# Patient Record
Sex: FEMALE | Race: WHITE | NOT HISPANIC OR LATINO | Employment: UNEMPLOYED | URBAN - METROPOLITAN AREA
[De-identification: names, ages, dates, MRNs, and addresses within clinical notes are randomized per-mention and may not be internally consistent; named-entity substitution may affect disease eponyms.]

---

## 2017-07-18 ENCOUNTER — TRANSFERRED RECORDS (OUTPATIENT)
Dept: HEALTH INFORMATION MANAGEMENT | Facility: CLINIC | Age: 42
End: 2017-07-18

## 2017-08-01 ENCOUNTER — BEH TREATMENT PLAN (OUTPATIENT)
Dept: BEHAVIORAL HEALTH | Facility: CLINIC | Age: 42
End: 2017-08-01
Attending: NURSE PRACTITIONER

## 2017-08-01 ENCOUNTER — HOSPITAL ENCOUNTER (OUTPATIENT)
Dept: BEHAVIORAL HEALTH | Facility: CLINIC | Age: 42
Discharge: HOME | End: 2017-08-01
Attending: PSYCHOLOGIST | Admitting: PSYCHOLOGIST
Payer: MEDICARE

## 2017-08-01 ENCOUNTER — TELEPHONE (OUTPATIENT)
Dept: BEHAVIORAL HEALTH | Facility: CLINIC | Age: 42
End: 2017-08-01

## 2017-08-01 PROCEDURE — 90791 PSYCH DIAGNOSTIC EVALUATION: CPT

## 2017-08-01 RX ORDER — LEVOTHYROXINE SODIUM 50 UG/1
50 TABLET ORAL
COMMUNITY
Start: 2016-11-01

## 2017-08-01 RX ORDER — LAMOTRIGINE 150 MG/1
150 TABLET ORAL
COMMUNITY
Start: 2017-07-05

## 2017-08-01 ASSESSMENT — PAIN SCALES - GENERAL: PAINLEVEL: NO PAIN (0)

## 2017-08-01 NOTE — PROGRESS NOTES
MY COPING PLAN FOR SAFETY          Things that are most important to me & reasons for living:  Enjoying life more and wanting to get back on track.  I want to achieve things in life.                My relapse Warning Signs: Increased use of alcohol, Increased negative thoughts, decreased motivation, staying in bed more, isolation, watching a lot of TV, feeling disinterested in doing anything even if I would normally enjoy it  I will make my environment safer by: Environment feels safe  When in crisis, I will use the following coping skills: (relaxation/self-soothing/distraction/activity):  Self-care, exercise, treating physical health symptoms, DBT skills- O2E/ER,   I will use My Support System:   Personal Supports: I can ask for reminders, support, or for them to stay with me  Trusted Friend/s:  Memo Hidalgo,   Family Member/s : Mom and Dad                 Professional Supports: I can ask for med changes, emergency appointments, help  Psychiatrist: Dr. Medina  Therapist: Rosanne    Other: Jose ([facility])    Crisis Lines I can call to discuss options and to access support:  By [facility]:    [place] ([facility]) [phone]   [place]/Luis Manuel (Mental Health Crisis Program) [phone]   [place]  [phone]   [place] (COPE) [phone]   [place] [phone]   [place] ([facility] Crisis [facility]) [phone]   [place] ([facility], [place], [place], [place], [facility]) [phone]   Other Crisis Lines:   [facility] (Counseling) [phone]   National Suicide Prevention [phone]   Suicide Prevention [phone]        X  I will attend my Treatment Program and talk to my one of my therapists:      X   I agree that I will report any current / recent thoughts, impulses or plans of suicide,           homicide, self injurious behavior or substance use .   X   I agree that I will not act on the above symptoms, but will follow the above plan         instead.  I can also go to the Emergency Department at  [facility]: [facility] [phone] or call: [phone]

## 2017-08-01 NOTE — PROGRESS NOTES
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 in assessment:   Bernadine was alone during this assessment.   This assessment does not include collateral information.      Summary & Recommendations  Provide a brief summary of how diagnostic criteria is met (symptoms, duration & functional impairment), cause, prognosis, and likely consequences of symptoms. Include overview of pertinent client strengths, cultural influences, life situations, relationships, health concerns and how diagnosis interacts/impacts with client's life. Recommendations include: client preferences, prioritization of needed mental health, ancillary or other services and any referrals to services required by statute or rule.     Bernadine is a 41 year old, single, white female.  She currently lives at home with her mom and step dad in a home in [place] [facility] after breaking up with her boyfriend three years ago. She is not currently employed but has a past history of working as a , , and in document review.  She states a desire to eventually work towards full time employment but recognizes it would be hard to find job due to gap in job history.  She states desire to find part time employment.  She is not currently enrolled in school but has a law degree from the [employer].  She reports having few friendships and states her former best friend moved away and told Bernadine that her (Jims) mental health problems were her own fault.  She reports a conflicted relationship with her younger brother.  She reports a decent relationship  with her mom and step dad despite periods of difficulty in the past.  Her team of providers includes a therapist ( Rosanne Barrera), chronic fatigue specialist (Stephane Burt), psychiatrist (Dr. Medina at [facility]), a primary care physician (Dr. Aguilar at [facility]), and an [employer] worker (Jose through [employer]).    She is referred to day treatment by her psychiatrist after being hospitalized twice very near each other.  The first hospitalization (Nov 2016) occurred after Bernadine attempted suicide by overdose on Ambien.  The plan was interrupted as she texted her parents who contacted the security at the hotel she was at.  Increasing symptoms and suicidal ideation lead up to second hospitalization at the end of February/beginning of March 2017.  She reports this is her only past suicide attempt but that she has frequently had ideation about attempting suicide with a gun; she does not have access to a gun and denies ever taking steps to obtain one.  She denies any safety related concerns at the time of assessment.  After her second hospitalization she attended [facility] programming and reports feeling much better since attending this program.  She states a desire to attend day treatment to create positive structure and maintain progress made while at [facility].    Bernadine reports symptoms including sad mood, irritability/anger, and anxiety related to tasks she is procrastinating.  She reports sleep difficulties including difficulty falling asleep and difficulty getting out of bed upon waking.  She notes presence of chronic fatigue syndrome diagnosis and fatigue as a symptom of depression with low motivation and energy.  She notes no difficulties concentrating but states difficulties with memory which she attributes to ECT treatment from November 2014.  She denies presence of manic/hypomanic symptoms currently or by history.  She denies psychotic and trauma related symptoms.  She reports CD treatment when one year ago  when she was drinking 5x/week (4 drinks each time).  She does not plan to live a life of abstinence and states she is not currently using due to being on an elimination diet for chronic fatigue syndrome.  She states previous to elimination diet she would drink 1-2x/week and one drink at a time.     Bernadine is placed in the 2B group meeting Mondays, Wednesdays and Fridays 9-12 starting 08/7/2017.      Prognosis is Fair. Without the recommended intervention, the client is likely to experience the following consequences of their symptoms: increase in depression, decrease in motivation, potential return of safety concerns and/or need for hospitalization.    Referrals to services required by statute or rule:   Report to child/adult protection services was NA.   Referral to another professional/service is not indicated at this time..    Program Recommendation: Day Treatment (DT) . 2B MWF 9-12    Assessment Completed by: Salome Sheppard

## 2017-08-01 NOTE — PROGRESS NOTES
"Initial Individual Treatment Plan     Patient: Bernadine Samaniego   MRN: [id number]  : 1975  Age: 41 year old  Sex: female    Diagnostic Assessment Date / Date of Initial Individual Treatment Plan: 17      Immediate Health Concerns:  Yes. Identify health concern and plan to address: CFS and has a specialist working with     Immediate Safety Concerns:  Yes. Identify safety concern and plan to address: safety plan created    Identify the issues to be addressed in treatment:  Symptom Management, Personal Safety, Community Resources/Discharge Planning, Develop / Improve Independent Living Skills, Develop Socialization / Interpersonal Relationship Skills and Physical Health     Client Initial Individualized Goals for Treatment: \"Positive structure.  Concerned about backsliding.  I want to maintain where I am now because I am doing much better.  Continue to move forward.  Work towards finding a part time job- figure out what makes sense as first steps and what type of job to pursue first.\".     Initial Treatment suggestions for the client during the time between Diagnostic Assessment and completion of the Individualized Treatment Plan:  Follow Safety Plan   Ask for more information, support and/or assistance as needed.  Follow up with providers/community supports as needed: Apt with psychiatrist this month, Therapist this week,   Report increases or changes in symptoms to staff.  Report any personal safety concerns to staff.   Take medications as prescribed.  Report medication changes and/or side effects to staff.  Attend and participate in groups as scheduled or notify staff if unable to do so.  Report any use of substances to staff as this may impact your symptoms and/or  personal safety.  Notify staff if you have any other issues that need to be addressed. This may include  any current abuse / neglect / exploitation or other vulnerability.  Follow recommendations of your treatment team and discuss concerns if " not in  agreement.     Treatment Team Responsible: Day Treatment (DT)      Therapeutic Interventions/Treatment Strategies may include:  Support, Redirection, Feedback, Limit/Boundaries, Safety Assessments, Structured Activity, Problem Solving, Clarification, Education, Motivational Enhancement and Relapse Prevention as needed.    Salome Sheppard

## 2017-08-01 NOTE — PROGRESS NOTES
Acknowledgement of Current Treatment Plan       I have reviewed my treatment plan with my therapist / counselor on 8/1/2017. I agree with the plan as it is written in the electronic health record.    Name Signature   Bernadine Samaniego    Name of Therapist / Counselor    Sylvester Sheppard MA [facility]

## 2017-08-03 ENCOUNTER — TELEPHONE (OUTPATIENT)
Dept: BEHAVIORAL HEALTH | Facility: CLINIC | Age: 42
End: 2017-08-03

## 2017-08-07 ENCOUNTER — HOSPITAL ENCOUNTER (OUTPATIENT)
Dept: BEHAVIORAL HEALTH | Facility: CLINIC | Age: 42
End: 2017-08-07
Attending: NURSE PRACTITIONER
Payer: MEDICARE

## 2017-08-07 PROCEDURE — H2012 BEHAV HLTH DAY TREAT, PER HR: HCPCS

## 2017-08-07 ASSESSMENT — ANXIETY QUESTIONNAIRES
6. BECOMING EASILY ANNOYED OR IRRITABLE: SEVERAL DAYS
GAD7 TOTAL SCORE: 5
2. NOT BEING ABLE TO STOP OR CONTROL WORRYING: SEVERAL DAYS
7. FEELING AFRAID AS IF SOMETHING AWFUL MIGHT HAPPEN: NOT AT ALL
1. FEELING NERVOUS, ANXIOUS, OR ON EDGE: SEVERAL DAYS
IF YOU CHECKED OFF ANY PROBLEMS ON THIS QUESTIONNAIRE, HOW DIFFICULT HAVE THESE PROBLEMS MADE IT FOR YOU TO DO YOUR WORK, TAKE CARE OF THINGS AT HOME, OR GET ALONG WITH OTHER PEOPLE: SOMEWHAT DIFFICULT
5. BEING SO RESTLESS THAT IT IS HARD TO SIT STILL: NOT AT ALL
3. WORRYING TOO MUCH ABOUT DIFFERENT THINGS: SEVERAL DAYS

## 2017-08-07 ASSESSMENT — PATIENT HEALTH QUESTIONNAIRE - PHQ9
SUM OF ALL RESPONSES TO PHQ QUESTIONS 1-9: 9
5. POOR APPETITE OR OVEREATING: SEVERAL DAYS

## 2017-08-07 NOTE — PROGRESS NOTES
"Adult Mental Health Outpatient Group Therapy Progress Note     Client Initial Individualized Goals for Treatment:    \"Positive structure.  Concerned about backsliding.  I want to maintain where I am now because I am doing much better.  Continue to move forward.  Work towards finding a part time job- figure out what makes sense as first steps and what type of job to pursue first.\".    See Initial Treatment suggestions for the client during the time between Diagnostic Assessment and completion of the Master Individualized Treatment Plan.    Treatment Goals:  Personal safety  Symptom Management     Area of Treatment Focus:  Symptom Management    Therapeutic Interventions/Treatment Strategies:  Support, Redirection, Feedback, Limit/Boundaries, Safety Assessments, Problem Solving, Clarification, Education, Motivational Enhancement Therapy and Cognitive Behavioral Therapy    Response to Treatment Strategies:  Accepted Feedback, Gave Feedback, Listened, Focused on Goals, Attentive and Accepted Support    Name of Group:  2B Group Psychotherapy     Description and Outcome:  Naomi reported being safe today.  She stated that she did not have a nice weekend, and felt ill most of the weekend, and that she is working with her doctor on issues related to her Chronic Fatigue Syndrome.  She reported that she finished a 2 week food elimination diet and that she still felt ill, and had e-mailed her doctor about eating peanuts and re-introducing fruit. She said that she would know more later today or tomorrow. She shared her issues with the group around mental health and physical health issues and how she is trying this special diet.  She reported that she did go out to see friends on Saturday and Sunday, despite not feeling very well, but was happy to get out to visit with friends. She reported problems with reflux and some vomiting of stomach acids over the past weekend. She said that she will contact her doctor for these " issues.      Client demonstrated understanding of session content by participating and sharing her thoughts and feelings with others.    Is this a Weekly Review of the Progress on the Treatment Plan?  Yes.      Are Treatment Plan Goals being addressed?  Yes, continue treatment goals      Are Treatment Plan Strategies to Address Goals Effective?  Yes, continue treatment strategies      Are there any current contracts in place?  No

## 2017-08-08 ASSESSMENT — ANXIETY QUESTIONNAIRES: GAD7 TOTAL SCORE: 5

## 2017-08-09 ENCOUNTER — HOSPITAL ENCOUNTER (OUTPATIENT)
Dept: BEHAVIORAL HEALTH | Facility: CLINIC | Age: 42
End: 2017-08-09
Attending: NURSE PRACTITIONER
Payer: MEDICARE

## 2017-08-09 VITALS
TEMPERATURE: 98.2 F | BODY MASS INDEX: 26.5 KG/M2 | SYSTOLIC BLOOD PRESSURE: 102 MMHG | HEIGHT: 60 IN | WEIGHT: 135 LBS | DIASTOLIC BLOOD PRESSURE: 55 MMHG | HEART RATE: 67 BPM

## 2017-08-09 PROBLEM — F33.1 MODERATE EPISODE OF RECURRENT MAJOR DEPRESSIVE DISORDER (H): Status: ACTIVE | Noted: 2017-08-09

## 2017-08-09 PROCEDURE — 99204 OFFICE O/P NEW MOD 45 MIN: CPT | Performed by: NURSE PRACTITIONER

## 2017-08-09 PROCEDURE — H2012 BEHAV HLTH DAY TREAT, PER HR: HCPCS

## 2017-08-09 ASSESSMENT — PAIN SCALES - GENERAL: PAINLEVEL: NO PAIN (0)
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
## 2017-08-09 NOTE — PROGRESS NOTES
"Adult Mental Health Outpatient Group Therapy Progress Note      Description and Outcome:  Client demonstrated understanding of session content by Client Initial Individualized Goals for Treatment:    \"Positive structure.  Concerned about backsliding.  I want to maintain where I am now because I am doing much better.  Continue to move forward.  Work towards finding a part time job- figure out what makes sense as first steps and what type of job to pursue first.\".     See Initial Treatment suggestions for the client during the time between Diagnostic Assessment and completion of the Master Individualized Treatment Plan.     Area of Treatment Focus:  Symptom Management, Personal Safety and Community Resources/Discharge Planning    Therapeutic Interventions/Treatment Strategies:  Support, Feedback, Safety Assessments, Clarification, Education and Cognitive Behavioral Therapy    Response to Treatment Strategies:  Accepted Feedback, Gave Feedback, Listened, Attentive and Alert    Name of Group:  Group Psychotherapy at 9:00 am and MHM at 11:00 am    Description and Outcome:   Bernadine was willing to take a turn and did a good job describing the emotional turmoil that is feeling right now.  She is experiencing symptoms of depression and not feeling like she is meeting her own expectations.  As she was speaking she did do a lot of comparing of herself to her \"younger self\".  She was very critical of yourself.  She even had difficulty acknowledging that she was being judgmental.  She was encouraged to think about her life situation and abilities in more realistic terms.  If she could do this she might not be feeling as much pressure as she is right now.    Bernadine attended Mental Health Management group and the topic was a follow up discussion on the Boundaries conversation from the previous session.  She does struggle with maintaining her own boundaries which too adds to the pressure she feels most days.  She is always having to " "\"do\" something.      Is this a Weekly Review of the Progress on the Treatment Plan?  Yes.      Are Treatment Plan Goals being addressed?  Yes, continue treatment goals      Are Treatment Plan Strategies to Address Goals Effective?  Yes, continue treatment strategies      Are there any current contracts in place?  No        "

## 2017-08-11 ENCOUNTER — HOSPITAL ENCOUNTER (OUTPATIENT)
Dept: BEHAVIORAL HEALTH | Facility: CLINIC | Age: 42
End: 2017-08-11
Attending: NURSE PRACTITIONER
Payer: MEDICARE

## 2017-08-11 PROCEDURE — H2012 BEHAV HLTH DAY TREAT, PER HR: HCPCS

## 2017-08-11 NOTE — PROGRESS NOTES
"Adult Mental Health Outpatient Group Therapy Progress Note     Client Initial Individualized Goals for Treatment: \"Positive structure.  Concerned about backsliding.  I want to maintain where I am now because I am doing much better.  Continue to move forward.  Work towards finding a part time job- figure out what makes sense as first steps and what type of job to pursue first.\".    See Initial Treatment suggestions for the client during the time between Diagnostic Assessment and completion of the Master Individualized Treatment Plan.    Treatment Goals:  See above     Area of Treatment Focus:  Symptom Management, Develop / Improve Independent Living Skills and Develop Socialization / Interpersonal Relationship Skills    Therapeutic Interventions/Treatment Strategies:  Support, Redirection, Feedback and Problem Solving    Response to Treatment Strategies:  Accepted Feedback, Gave Feedback, Listened, Focused on Goals, Attentive and Accepted Support    Name of Group:  Psychotherapy     Description and Outcome:  Bernadine reported feeling tired due to poor sleep.  She reported making plans to visit Christopher (man who she believed to be her father for many years but is not her father) this weekend.  Validated and normalized difficult feelings associated with this process.  She noted that once she got past talking to him on the phone she felt less anxious.  She reported feeling excited and nervous to spend time with him.  Provided skill suggestions to help cope with anxiety to focus on positive feelings.  She did not endorse safety concerns. She provided appropriate feedback during group and participated effectively.    Client verbalized understanding of session content by stating plans to focus on enjoying her time with Christopher.    Is this a Weekly Review of the Progress on the Treatment Plan?  No    "

## 2017-08-11 NOTE — PROGRESS NOTES
"Adult Mental Health Outpatient Group Therapy Progress Note     Date: 8/07/17  Time: 10:00-10:50    Client Initial Individualized Goals for Treatment:\"Positive structure.  Concerned about backsliding.  I want to maintain where I am now because I am doing much better.  Continue to move forward.  Work towards finding a part time job- figure out what makes sense as first steps and what type of job to pursue first.\".      Initial Treatment suggestions for the client during the time between Diagnostic Assessment and completion of the Individualized Treatment Plan:  Follow Safety Plan   Ask for more information, support and/or assistance as needed.  Follow up with providers/community supports as needed: Apt with psychiatrist this month, Therapist this week,   Report increases or changes in symptoms to staff.  Report any personal safety concerns to staff.   Take medications as prescribed.  Report medication changes and/or side effects to staff.  Attend and participate in groups as scheduled or notify staff if unable to do so.  Report any use of substances to staff as this may impact your symptoms and/or personal safety.  Notify staff if you have any other issues that need to be addressed. This may include any current abuse / neglect / exploitation or other vulnerability.  Follow recommendations of your treatment tea       Area of Treatment Focus:  Symptom Management, Personal Safety, Community Resources/Discharge Planning, Develop / Improve Independent Living Skills and Develop Socialization / Interpersonal Relationship Skills    Therapeutic Interventions/Treatment Strategies:  Support, Redirection, Feedback, Limit/Boundaries, Safety Assessments, Structured Activity, Problem Solving, Clarification and Education    Response to Treatment Strategies:  Gave Feedback, Listened, Attentive and Alert    Name of Group:  Self support skills     Description and Outcome: Today is Bernadine's first day in the day treatment program.  She " attended and participated in a structured self-support skills group where intervention focuses on learning, developing, and practicing coping and life skills and strategies through structured experiential psycho-education to improve function in valued roles, routines, relationships, and independent living skills. Today the focus was on healthy personal boundaries with an opportunity to apply taught concepts to 2 of their personal relationships. This includes a discussion and assessing the boundaries (rigid, pourous, loose) and what is healthy in clients mind in terms of what information/space/time they will share and won't share. Ashley participates intermittently, guarded likely as it is her first day and needs to build trust and rapport with peer group. Affect even.  Client would benefit from additional opportunities to practice and implement content from this session.    Treatment Planning Meetings:  8/07/17: first day in program.    Is this a Weekly Review of the Progress on the Treatment Plan?  No

## 2017-08-14 ENCOUNTER — HOSPITAL ENCOUNTER (OUTPATIENT)
Dept: BEHAVIORAL HEALTH | Facility: CLINIC | Age: 42
End: 2017-08-14
Attending: NURSE PRACTITIONER
Payer: MEDICARE

## 2017-08-14 PROCEDURE — H2012 BEHAV HLTH DAY TREAT, PER HR: HCPCS

## 2017-08-14 NOTE — PROGRESS NOTES
"Adult Mental Health Outpatient Group Therapy Progress Note      Description and Outcome:  Client demonstrated understanding of session content by Client Initial Individualized Goals for Treatment:    \"Positive structure.  Concerned about backsliding.  I want to maintain where I am now because I am doing much better.  Continue to move forward.  Work towards finding a part time job- figure out what makes sense as first steps and what type of job to pursue first.\".     See Initial Treatment suggestions for the client during the time between Diagnostic Assessment and completion of the Master Individualized Treatment Plan.     Area of Treatment Focus:  Symptom Management, Personal Safety and Community Resources/Discharge Planning    Therapeutic Interventions/Treatment Strategies:  Support, Feedback, Safety Assessments, Clarification, Education and Cognitive Behavioral Therapy    Response to Treatment Strategies:  Accepted Feedback, Gave Feedback, Listened, Attentive and Alert, Needed some redirection    Name of Group:  Group Psychotherapy at 9:00 am     Description and Outcome:   Bernadine was more talkative today.  In fact she needed to be redirected at one point to stop talking so that others could have a turn.  It seemed frustrating for her that she needed to stop.  She had a good weekend because she reconnected physically with a man who she believed was her father for several years.  She shared many details about how her mother lied to her about this man after he was out of Bernadine's life.  She is now stuck with the feelings of anger towards her mother because Bernadine believes that these lies determined what her life has been like since.  She has knowledge that this resentment is not helpful but cannot let it go.  Others could connect with her and validated her feelings.  Bernadine seems to have a set of skills but seems to be having a hard time using her skills to help her move forward.  She is generally depressed thus far.  " She is appropriate in group and most likely will benefit from attending the group.    Is this a Weekly Review of the Progress on the Treatment Plan?  Yes.      Are Treatment Plan Goals being addressed?  Yes, continue treatment goals      Are Treatment Plan Strategies to Address Goals Effective?  Yes, continue treatment strategies      Are there any current contracts in place?  No

## 2017-08-15 NOTE — PROGRESS NOTES
"Adult Mental Health Outpatient Group Therapy Progress Note     Date: 8/11/17  Time: 10:00-10:50    Client Initial Individualized Goals for Treatment:\"Positive structure.  Concerned about backsliding.  I want to maintain where I am now because I am doing much better.  Continue to move forward.  Work towards finding a part time job- figure out what makes sense as first steps and what type of job to pursue first.\".      Initial Treatment suggestions for the client during the time between Diagnostic Assessment and completion of the Individualized Treatment Plan:  Follow Safety Plan   Ask for more information, support and/or assistance as needed.  Follow up with providers/community supports as needed: Apt with psychiatrist this month, Therapist this week,   Report increases or changes in symptoms to staff.  Report any personal safety concerns to staff.   Take medications as prescribed.  Report medication changes and/or side effects to staff.  Attend and participate in groups as scheduled or notify staff if unable to do so.  Report any use of substances to staff as this may impact your symptoms and/or personal safety.  Notify staff if you have any other issues that need to be addressed. This may include any current abuse / neglect / exploitation or other vulnerability.  Follow recommendations of your treatment tea       Area of Treatment Focus:  Symptom Management, Personal Safety, Community Resources/Discharge Planning, Develop / Improve Independent Living Skills and Develop Socialization / Interpersonal Relationship Skills    Therapeutic Interventions/Treatment Strategies:  Support, Redirection, Feedback, Limit/Boundaries, Safety Assessments, Structured Activity, Problem Solving, Clarification and Education    Response to Treatment Strategies:  Gave Feedback, Listened, Attentive and Alert    Name of Group:  Self support skills     Description and Outcome: Today is Bernadine's first day in the day treatment program.  She " attended and participated in a structured self-support skills group where intervention focuses on learning, developing, and practicing coping and life skills and strategies through structured experiential psycho-education to improve function in valued roles, routines, relationships, and independent living skills.Today the focus was on weekly reflection of what went well, what skills were used throughout the week and progress towards goals as well as a structured positive self reflection. Group members then engaged in weekend wellness planning where they are prompted to set their intentions for this weekend with focus on balance, self-care, structure and routine needs and integrating their goals. Bernadine is once again hesitant but as others share she does tend to the task. She becomes tearful when sharing. Stating she finds it hard to focus on anything positive in her life. Validation and support provided, she did endorse understanding the importance of trying. Client would benefit from additional opportunities to practice and implement content from this session.    Treatment Planning Meetings:  8/18/17:  8/07/17: first day in program.    Is this a Weekly Review of the Progress on the Treatment Plan?  No

## 2017-08-15 NOTE — PROGRESS NOTES
"Adult Mental Health Outpatient Group Therapy Progress Note     Date: 8/09/17  Time: 10:00-10:50    Client Initial Individualized Goals for Treatment:\"Positive structure.  Concerned about backsliding.  I want to maintain where I am now because I am doing much better.  Continue to move forward.  Work towards finding a part time job- figure out what makes sense as first steps and what type of job to pursue first.\".      Initial Treatment suggestions for the client during the time between Diagnostic Assessment and completion of the Individualized Treatment Plan:  Follow Safety Plan   Ask for more information, support and/or assistance as needed.  Follow up with providers/community supports as needed: Apt with psychiatrist this month, Therapist this week,   Report increases or changes in symptoms to staff.  Report any personal safety concerns to staff.   Take medications as prescribed.  Report medication changes and/or side effects to staff.  Attend and participate in groups as scheduled or notify staff if unable to do so.  Report any use of substances to staff as this may impact your symptoms and/or personal safety.  Notify staff if you have any other issues that need to be addressed. This may include any current abuse / neglect / exploitation or other vulnerability.  Follow recommendations of your treatment tea       Area of Treatment Focus:  Symptom Management, Personal Safety, Community Resources/Discharge Planning, Develop / Improve Independent Living Skills and Develop Socialization / Interpersonal Relationship Skills    Therapeutic Interventions/Treatment Strategies:  Support, Redirection, Feedback, Limit/Boundaries, Safety Assessments, Structured Activity, Problem Solving, Clarification and Education    Response to Treatment Strategies:  Gave Feedback, Listened, Attentive and Alert    Name of Group:  Self support skills     Description and Outcome: Today is Bernadine's first day in the day treatment program.  She " attended and participated in a structured self-support skills group where intervention focuses on learning, developing, and practicing coping and life skills and strategies through structured experiential psycho-education to improve function in valued roles, routines, relationships, and independent living skills. Today the focus was on creating a self-support skills deck based on previous psycho-ed. For Bernadine this included initial set up and working on creating a meaningful/effective tool for the future. Bernadine is skeptical but after watching others does engage. Irritable, contrary in responses to writer and peers.   Client would benefit from additional opportunities to practice and implement content from this session.    Treatment Planning Meetings:  8/18/17:  8/07/17: first day in program.    Is this a Weekly Review of the Progress on the Treatment Plan?  No

## 2017-08-16 ENCOUNTER — HOSPITAL ENCOUNTER (OUTPATIENT)
Dept: BEHAVIORAL HEALTH | Facility: CLINIC | Age: 42
End: 2017-08-16
Attending: NURSE PRACTITIONER
Payer: MEDICARE

## 2017-08-16 PROCEDURE — H2012 BEHAV HLTH DAY TREAT, PER HR: HCPCS

## 2017-08-18 NOTE — PROGRESS NOTES
"Adult Mental Health Outpatient Group Therapy Progress Note     Date: 8/16/17  Time: 10:00-10:50    Client Initial Individualized Goals for Treatment:\"Positive structure.  Concerned about backsliding.  I want to maintain where I am now because I am doing much better.  Continue to move forward.  Work towards finding a part time job- figure out what makes sense as first steps and what type of job to pursue first.\".      Initial Treatment suggestions for the client during the time between Diagnostic Assessment and completion of the Individualized Treatment Plan:  Follow Safety Plan   Ask for more information, support and/or assistance as needed.  Follow up with providers/community supports as needed: Apt with psychiatrist this month, Therapist this week,   Report increases or changes in symptoms to staff.  Report any personal safety concerns to staff.   Take medications as prescribed.  Report medication changes and/or side effects to staff.  Attend and participate in groups as scheduled or notify staff if unable to do so.  Report any use of substances to staff as this may impact your symptoms and/or personal safety.  Notify staff if you have any other issues that need to be addressed. This may include any current abuse / neglect / exploitation or other vulnerability.  Follow recommendations of your treatment tea       Area of Treatment Focus:  Symptom Management, Personal Safety, Community Resources/Discharge Planning, Develop / Improve Independent Living Skills and Develop Socialization / Interpersonal Relationship Skills    Therapeutic Interventions/Treatment Strategies:  Support, Redirection, Feedback, Limit/Boundaries, Safety Assessments, Structured Activity, Problem Solving, Clarification and Education    Response to Treatment Strategies:  Gave Feedback, Listened, Attentive and Alert    Name of Group:  Self support skills     Description and Outcome: Bernadine attended and participated in a structured self-support " skills group where intervention focuses on learning, developing, and practicing coping and life skills and strategies through structured experiential psycho-education to improve function in valued roles, routines, relationships, and independent living skills.Today the focus is on practicing coping skills and mindfulness through focused activity. This gives group members and opportunity to explore using taught coping skills while engaging in a structured task that challenges them to focus and engage in problem solving and completion of task. Bernadine focuses on making progress with personal business. She struggles some with increased reactivity to the process and uses the group to help initiate use of breathing and radical acceptance. Affect labile. Reports she won't be her on Friday due to going out of town. Bernadine would benefit from additional opportunities to practice and implement content from this session.    Treatment Planning Meetings:  8/21/17:  8/07/17: first day in program.    Is this a Weekly Review of the Progress on the Treatment Plan?  Yes.      Are Treatment Plan Goals being addressed?  Yes, continue treatment goals      Are Treatment Plan Strategies to Address Goals Effective?  Yes, continue treatment strategies      Are there any current contracts in place?  No

## 2017-08-18 NOTE — PROGRESS NOTES
"Adult Mental Health Outpatient Group Therapy Progress Note     Date: 8/14/17  Time: 10:00-10:50    Client Initial Individualized Goals for Treatment:\"Positive structure.  Concerned about backsliding.  I want to maintain where I am now because I am doing much better.  Continue to move forward.  Work towards finding a part time job- figure out what makes sense as first steps and what type of job to pursue first.\".      Initial Treatment suggestions for the client during the time between Diagnostic Assessment and completion of the Individualized Treatment Plan:  Follow Safety Plan   Ask for more information, support and/or assistance as needed.  Follow up with providers/community supports as needed: Apt with psychiatrist this month, Therapist this week,   Report increases or changes in symptoms to staff.  Report any personal safety concerns to staff.   Take medications as prescribed.  Report medication changes and/or side effects to staff.  Attend and participate in groups as scheduled or notify staff if unable to do so.  Report any use of substances to staff as this may impact your symptoms and/or personal safety.  Notify staff if you have any other issues that need to be addressed. This may include any current abuse / neglect / exploitation or other vulnerability.  Follow recommendations of your treatment tea       Area of Treatment Focus:  Symptom Management, Personal Safety, Community Resources/Discharge Planning, Develop / Improve Independent Living Skills and Develop Socialization / Interpersonal Relationship Skills    Therapeutic Interventions/Treatment Strategies:  Support, Redirection, Feedback, Limit/Boundaries, Safety Assessments, Structured Activity, Problem Solving, Clarification and Education    Response to Treatment Strategies:  Gave Feedback, Listened, Attentive and Alert    Name of Group:  Self support skills     Description and Outcome: Today is Bernadine's first day in the day treatment program.  She " attended and participated in a structured self-support skills group where intervention focuses on learning, developing, and practicing coping and life skills and strategies through structured experiential psycho-education to improve function in valued roles, routines, relationships, and independent living skills. Today focus was on time management, identifying values based priorities and making small intentions around skills they want to use. Bernadine is productive, identifies radical acceptance as a skill she will focus on this week. Affect even today. Client would benefit from additional opportunities to practice and implement content from this session.    Treatment Planning Meetings:  8/18/17:  8/07/17: first day in program.    Is this a Weekly Review of the Progress on the Treatment Plan?  No

## 2017-08-21 NOTE — PROGRESS NOTES
Acknowledgement of Current Treatment Plan       I have reviewed my treatment plan with my therapist / counselor on 12/08/2017. I agree with the plan as it is written in the electronic health record.    Name Signature   Bernadine Samaniego    Name of Therapist / Counselor    Ange Wharton, [facility]  Mary Ellen Wood, [facility]  Andrew Osborn, MARGOTH Allen Aneliya, CNP

## 2017-08-21 NOTE — PROGRESS NOTES
Individualized Treatment Plan     Date of Plan: 9/15/17    Name: Bernadine Samaniego MRN: [id number]    : 1975    Programs:  Day Treatment (DT)     Clinical Track (if applicable):  2B    DSM5 Diagnosis  296.32 (F33.1) Major Depressive Disorder, Recurrent Episode, Moderate _ and With anxious distress    Team Members Contributing to Plan:  Ирина Wharton, Dorothy Last and Nila Osborn    Client Strengths:  caring, creative, educated, goal-focused, good listener, has a previous history of therapy, insightful, intelligent, motivated, open to learning, open to suggestions / feedback, supportive, wants to learn, willing to ask questions and willing to relate to others    Client Participation in Plan:  Contributed to goals and plan   Attended individual treatment plan meeting on 17 with subsequent meetings monthly  Agrees with plan   Received copy of treatment plan   Discussed with staff     Areas of Vulnerability:  Anxiety  Depressive symptoms     Long-Term Goals:  Knowledge about illness and management of symptoms   Maintenance of personal safety     Abuse Prevention Plan:  Safe, therapeutic environment   Safety coping plan as needed   Education regarding illness and skill development   Coordination with care providers     Discharge Criteria:  Satisfactory progress toward treatment goals   Improvement re: identified problems and symptoms   Ability to continue recovery at next level of service   Has a discharge plan in place   Has safety/coping plan in place   Regular attendance as scheduled   Discharge:  or   Areas of Treatment Focus       17: Bernadine was originally scheduled to have a treatment plan meeting on the . She has been unable to attend a meeting until this date due to travel and absences.      Area of Treatment Focus:   Personal Safety  Start Date:    17    Goal:  Target Date: 10/02/17, 17, 17 Status: Active  Client will notify staff when needing  "assistance to develop or implement a coping plan to manage suicidal or self injurious urges.  Client will use coping plan for safety, as needed.      Progress:  12/08/17: Endorses feeling safe.   11/03/17: Bernadine endorses no thoughts.   10/02/17: Bernadine endorses feeling worse last couple of weeks due to phsyical illness. No thoughts of self harm.   9/01/17: Team members met with Bernadine, discussed program, process, progress, and set treatment goals. Endorses no thoughts for four months. Has good support system.           Treatment Strategies:   Assist clients in establishing / strengthening support network  Assess / reassess for appropriate therapy program involvement, encourage participation in therapies  Assess / reassess level of potential for harm to self or others  Engage in safety planning when indicated  Facilitate increased self awareness        Area of Treatment Focus:   Symptom Stabilization and Management  Start Date:    9/01/17    Goal:  Target Date: 10/02/17, 11/03/17. 12/08/17 Status: Active  Bernadine will process life stressors and identify ways to work through and problem solve current issues as they come up. Honoring boundary around limiting talk around parents.       Progress:  12/08/17: Continue to work on stressors and practicing radical acceptance and identifying ways to structure time post discharge.   11/03/17: Going good. Would like to set boundary around not talking about her parents as much. Would like to focus more on other issues.   10/02/17: Progressing, continue goal. She expresses frustration from \"slipping\" when not feeling well. Discussed focusing on self-compassion.    9/01/17: Team members met with Bernadine, discussed program, process, progress, and set treatment goals.        Treatment Strategies:   Assist clients in establishing / strengthening support network  Assist to identify treatment goals  Assist with discharge planning  Assess / reassess for appropriate therapy program involvement, " encourage participation in therapies  Assess / reassess level of potential for harm to self or others  Engage in safety planning when indicated  Facilitate increased self awareness  Provide education regarding symptom management, self-talk, triggers, thoughts, emotions, behaviors  Provide feedback about social skills  Set limits on intrusive / aggressive behaviors  Teach adaptive coping skills and communication skills  Use reality based supportive approach          Area of Treatment Focus:   Develop / Improve Independent Living / Socialization Skills  Start Date:    9/01/17    Goal:  Target Date: 10/03/17, 11/03/17. 12/08/17 Status: Active  In life skills Bernadine will learn, explore, practice and apply 2-3 skills/strategies that promote mindfulness and help her establish routine and life balance specifically in area of finding employment. Report on progress weekly. She will practice identifying when self-compassion needs.       Progress:  12/08/17: Bernadine will be provided resources for [facility] as possibility.   11/03/17: Has been working on self-compassion. Has a part time job.   10/02/17: Progressing, continue. Focus on small ways to incorporate her goals into her week. Recent physical illness has set her back, motivated to do more but frustrated by barriers. Discussed self-care and self-compassion as a possible focus.   9/01/17: Team members met with Bernadine, discussed program, process, progress, and set treatment goals.        Treatment Strategies:   Facilitate increased self awareness  Provide education regarding time management, life balance, prioritizing, values identification, sensory and mindfulness based coping skills, focused activity  Provide feedback about social skills  Teach adaptive coping skills and communication skills        Area of Treatment Focus:   Community Resources / Support and Discharge Planning  Start Date:  9/01/17    Goal:  Target Date: 10/02/17, 11/03/17, 12/08/17 Status: Active  Will improve  wellness related behaviors by setting wellness goals weekly. Reporting on progress weekly.   12/08/17: Bernadine will identify venues for increased structure to be in place by time of discharge January 9th or 10th.       Progress:  12/08/17: Bernadine is considering: asking her therapist about EMDR. She has an [facility] worker who has been helping her with her DBT skills. Unfortunately that person is moving on and she will be getting another which makes. Staff will provide info on CSP's. She continues to endorse needing assistance for finance organization and finding a job. She endorses needing to have structure.  She is considering a 3 day a week DBT ([facility]) or [facility].  11/03/17: Continue goal. Bernadine is frustrated that her exercise has dropped back. She has joined a gym. She wants to focus.   10/02/17: Bernadine is frustrated with her physical health and doctors. She will work on identifying a medical MD or NP to help her problem solve her issues more holistically. She would like resources. Information on possibly starting probiotics.    9/01/17: Team members met with Bernadine, discussed program, process, progress, and set treatment goals. Bernadine has a psychologist once a week. Has a psychiatrist in place. Hopes to work part time at discharge.         Treatment Strategies:   Assist with discharge planning  Assess / reassess for appropriate therapy program involvement, encourage participation in therapies  Assess / reassess level of potential for harm to self or others  Engage in safety planning when indicated  Facilitate increased self awareness  Provide education regarding wellness, medication management, physical activity/exercise, nutrition, sleep hygience, anxiety  Teach adaptive coping skills and communication skills

## 2017-08-23 ENCOUNTER — HOSPITAL ENCOUNTER (OUTPATIENT)
Dept: BEHAVIORAL HEALTH | Facility: CLINIC | Age: 42
End: 2017-08-23
Attending: NURSE PRACTITIONER
Payer: MEDICARE

## 2017-08-23 PROCEDURE — H2012 BEHAV HLTH DAY TREAT, PER HR: HCPCS

## 2017-08-23 NOTE — PROGRESS NOTES
"Adult Mental Health Outpatient Group Therapy Progress Note     Date: 8/23/17  Time: 10:00-10:50    Client Initial Individualized Goals for Treatment:\"Positive structure.  Concerned about backsliding.  I want to maintain where I am now because I am doing much better.  Continue to move forward.  Work towards finding a part time job- figure out what makes sense as first steps and what type of job to pursue first.\".      Initial Treatment suggestions for the client during the time between Diagnostic Assessment and completion of the Individualized Treatment Plan:  Follow Safety Plan   Ask for more information, support and/or assistance as needed.  Follow up with providers/community supports as needed: Apt with psychiatrist this month, Therapist this week,   Report increases or changes in symptoms to staff.  Report any personal safety concerns to staff.   Take medications as prescribed.  Report medication changes and/or side effects to staff.  Attend and participate in groups as scheduled or notify staff if unable to do so.  Report any use of substances to staff as this may impact your symptoms and/or personal safety.  Notify staff if you have any other issues that need to be addressed. This may include any current abuse / neglect / exploitation or other vulnerability.  Follow recommendations of your treatment tea       Area of Treatment Focus:  Symptom Management, Personal Safety, Community Resources/Discharge Planning, Develop / Improve Independent Living Skills and Develop Socialization / Interpersonal Relationship Skills    Therapeutic Interventions/Treatment Strategies:  Support, Redirection, Feedback, Limit/Boundaries, Safety Assessments, Structured Activity, Problem Solving, Clarification and Education    Response to Treatment Strategies:  Gave Feedback, Listened, Attentive and Alert    Name of Group:  Self support skills     Description and Outcome: Bernadine attended and participated in a structured self-support " skills group where intervention focuses on learning, developing, and practicing coping and life skills and strategies through structured experiential psycho-education to improve function in valued roles, routines, relationships, and independent living skills. Today the focus is on stress management, with emphasis on identifying, planning for, and problem solve ways to include leisure or enjoyable activities into each day based on personal preferences. Bernadine is minimally engaged with content and intended work of the group. When given opportunities to engage or share she declines. Does not make eye contact with writer. She does however engage in conversation with peers but about non-topic related things. Currently demonstrating a resistance or lack of openess to engaging in learning or at least exploring in self-support skills group today.  Affect even. Will continue to monitor and assess. Bernadine would benefit from additional opportunities to practice and implement content from this session.    Treatment Planning Meetings:  8/23/17:  8/07/17: first day in program.    Is this a Weekly Review of the Progress on the Treatment Plan?  No.

## 2017-08-30 ENCOUNTER — HOSPITAL ENCOUNTER (OUTPATIENT)
Dept: BEHAVIORAL HEALTH | Facility: CLINIC | Age: 42
End: 2017-08-30
Attending: NURSE PRACTITIONER
Payer: MEDICARE

## 2017-08-30 PROCEDURE — H2012 BEHAV HLTH DAY TREAT, PER HR: HCPCS

## 2017-08-30 NOTE — PROGRESS NOTES
Psychiatry staffing: case discussed  Diagnosis: MDD. Doing well.     Current Outpatient Prescriptions   Medication     BuPROPion HCl (WELLBUTRIN PO)     lamoTRIgine (LAMICTAL) 150 MG tablet     levothyroxine (SYNTHROID/LEVOTHROID) 50 MCG tablet     Vilazodone HCl (VIIBRYD PO)     norethindrone-ethinyl estradiol (MICROGESTIN 1.5/30) 1.5-30 MG-MCG per tablet     etonogestrel-ethinyl estradiol (NUVARING) 0.12-0.015 MG/24HR vaginal ring     No current facility-administered medications for this encounter.      Past Medical History:   Diagnosis Date     Depression     In counseling; has Psychiatrist     Depressive disorder

## 2017-09-01 ENCOUNTER — HOSPITAL ENCOUNTER (OUTPATIENT)
Dept: BEHAVIORAL HEALTH | Facility: CLINIC | Age: 42
End: 2017-09-01
Attending: NURSE PRACTITIONER
Payer: MEDICARE

## 2017-09-01 PROCEDURE — H2012 BEHAV HLTH DAY TREAT, PER HR: HCPCS

## 2017-09-01 NOTE — PROGRESS NOTES
"Adult Mental Health Outpatient Group Therapy Progress Note     Date: 9/01/17  Time: 10:00-10:50    Client Initial Individualized Goals for Treatment:\"Positive structure.  Concerned about backsliding.  I want to maintain where I am now because I am doing much better.  Continue to move forward.  Work towards finding a part time job- figure out what makes sense as first steps and what type of job to pursue first.\".      Treatment Goals from Individualized Treatment Plan:   1) Safety: Client will notify staff when needing assistance to develop or implement a coping plan to manage suicidal or self injurious urges.  Client will use coping plan for safety, as needed.  2) Symptom Management: Bernadine will process life stressors and identify ways to work through and problem solve current issues as they come up.  3) Life Skills: n life skills Bernadine will learn, explore, practice and apply 2-3 skills/strategies that promote mindfulness and help her establish routine and life balance specifically in area of finding employment. Report on progress weekly.   4) Wellness/D.c planning: Will improve wellness related behaviors by setting wellness goals weekly. Reporting on progress weekly      Area of Treatment Focus:  Symptom Management, Personal Safety, Community Resources/Discharge Planning, Develop / Improve Independent Living Skills and Develop Socialization / Interpersonal Relationship Skills    Therapeutic Interventions/Treatment Strategies:  Support, Redirection, Feedback, Limit/Boundaries, Safety Assessments, Structured Activity, Problem Solving, Clarification and Education    Response to Treatment Strategies:  Gave Feedback, Listened, Attentive and Alert    Name of Group:  Self support skills     Description and Outcome: Bernadine attended and participated in a structured self-support skills group where intervention focuses on learning, developing, and practicing coping and life skills and strategies through structured experiential " psycho-education to improve function in valued roles, routines, relationships, and independent living skills. Today the focus is on personal reflection of what went well this past week, including progress towards goals, skills they used, and an opportunity to express gratitude for something in their life. They also spent time planning for weekend wellness, setting intentions for structure, balance of activities, and with an emphasis on self cares. Bernadine engages fully, she continues to lament her job situation but was able to turn negative thought process into a more positive outlook effectively. Validation provided. Affect even..Bernadine would benefit from additional opportunities to practice and implement content from this session.    Treatment Planning Meetings:  9/01/17: Team members met with Bernadine, discussed program, process, progress and set treatment goals with her. See Individualized treatment goals for details.   : Absent.  8/07/17: first day in program.    Is this a Weekly Review of the Progress on the Treatment Plan?  No.

## 2017-09-01 NOTE — PROGRESS NOTES
"Adult Mental Health Outpatient Group Therapy Progress Note      Description and Outcome:  Client demonstrated understanding of session content by Client Initial Individualized Goals for Treatment:    \"Positive structure.  Concerned about backsliding.  I want to maintain where I am now because I am doing much better.  Continue to move forward.  Work towards finding a part time job- figure out what makes sense as first steps and what type of job to pursue first.\".     See Initial Treatment suggestions for the client during the time between Diagnostic Assessment and completion of the Master Individualized Treatment Plan.     Area of Treatment Focus:  Symptom Management, Personal Safety and Community Resources/Discharge Planning    Therapeutic Interventions/Treatment Strategies:  Support, Feedback, Safety Assessments, Clarification, Education and Cognitive Behavioral Therapy    Response to Treatment Strategies:  Accepted Feedback, Gave Feedback, Listened, Attentive and Alert, Needed some redirection    Name of Group:  Group Psychotherapy at 9:00 am and [facility] 10:00 am    Description and Outcome:   Bernadine was more talkative today.  In fact she needed to be redirected at one point to stop talking so that others could have a turn.  It seemed frustrating for her that she needed to stop.  She had a frustrating weekend because she continues to be angry at an ex-boyfriend who got rid of a lot of her stuff.  She left this stuff at his house for over 4 years but his frustrated now because he got rid of it and blamed her for \"abandoning\" all of it.  She does not agree with him and went over her thoughts about the situation in group.  She is struggling with looking at her behaviors in this frustrating situation that she finds herself in now.  She is also angry at her parents who want her to do something with the stuff that is at their house.      Bernadine attended Mental Health Management group and the topic again was about boundaries.  " She is struggling with allowing others to have reasonable boundaries even when she has different expectations of them.  She does not able to acknowledge that her boundaries might be impaired because of the severity of her symptoms.    Is this a Weekly Review of the Progress on the Treatment Plan?  Yes.      Are Treatment Plan Goals being addressed?  Yes, continue treatment goals      Are Treatment Plan Strategies to Address Goals Effective?  Yes, continue treatment strategies      Are there any current contracts in place?  No

## 2017-09-01 NOTE — PROGRESS NOTES
"Adult Mental Health Outpatient Group Therapy Progress Note     Date: 8/30/17  Time: 11:00-11:50    Client Initial Individualized Goals for Treatment:\"Positive structure.  Concerned about backsliding.  I want to maintain where I am now because I am doing much better.  Continue to move forward.  Work towards finding a part time job- figure out what makes sense as first steps and what type of job to pursue first.\".      Initial Treatment suggestions for the client during the time between Diagnostic Assessment and completion of the Individualized Treatment Plan:  Follow Safety Plan   Ask for more information, support and/or assistance as needed.  Follow up with providers/community supports as needed: Apt with psychiatrist this month, Therapist this week,   Report increases or changes in symptoms to staff.  Report any personal safety concerns to staff.   Take medications as prescribed.  Report medication changes and/or side effects to staff.  Attend and participate in groups as scheduled or notify staff if unable to do so.  Report any use of substances to staff as this may impact your symptoms and/or personal safety.  Notify staff if you have any other issues that need to be addressed. This may include any current abuse / neglect / exploitation or other vulnerability.  Follow recommendations of your treatment tea       Area of Treatment Focus:  Symptom Management, Personal Safety, Community Resources/Discharge Planning, Develop / Improve Independent Living Skills and Develop Socialization / Interpersonal Relationship Skills    Therapeutic Interventions/Treatment Strategies:  Support, Redirection, Feedback, Limit/Boundaries, Safety Assessments, Structured Activity, Problem Solving, Clarification and Education    Response to Treatment Strategies:  Gave Feedback, Listened, Attentive and Alert    Name of Group:  Self support skills     Description and Outcome: Bernadine attended and participated in a structured self-support " skills group where intervention focuses on learning, developing, and practicing coping and life skills and strategies through structured experiential psycho-education to improve function in valued roles, routines, relationships, and independent living skills. Today the focus is continued on Mindfulness with a focused practice on breathing followed by engaging in focused activity. Education and facilitated practice of deep breathing techniques and discussion around when to use deep breathing techniques. Group members then engaged in a group activity to practice mindfulness through listening and sharing. Bernadine stated she enjoyed the group activity  Affect even..Bernadine would benefit from additional opportunities to practice and implement content from this session.    Treatment Planning Meetings:  9/01/17:    8/07/17: first day in program.    Is this a Weekly Review of the Progress on the Treatment Plan?  No.

## 2017-09-01 NOTE — PROGRESS NOTES
"Adult Mental Health Outpatient Group Therapy Progress Note      Description and Outcome:  Client demonstrated understanding of session content by Client Initial Individualized Goals for Treatment:    \"Positive structure.  Concerned about backsliding.  I want to maintain where I am now because I am doing much better.  Continue to move forward.  Work towards finding a part time job- figure out what makes sense as first steps and what type of job to pursue first.\".     See Initial Treatment suggestions for the client during the time between Diagnostic Assessment and completion of the Master Individualized Treatment Plan.     Area of Treatment Focus:  Symptom Management, Personal Safety and Community Resources/Discharge Planning    Therapeutic Interventions/Treatment Strategies:  Support, Feedback, Safety Assessments, Clarification, Education and Cognitive Behavioral Therapy    Response to Treatment Strategies:  Accepted Feedback, Gave Feedback, Listened, Attentive and Alert, Needed some redirection    Name of Group:  Group Psychotherapy at 9:00 am    Description and Outcome:   Bernadine was more reasonable today.  She focused more on herself today.  She spoke about her goal of getting back to work but only on a part-time basis.  She did not blame others for where she finds herself.  She was putting down the Social Security system but that makes more sense to the group members.  She attended group twice this week.  Her symptoms seem to be decreasing and she denies active daily suicidal ideation.  Today she did attend her treatment plan.    Is this a Weekly Review of the Progress on the Treatment Plan?  No.      Are Treatment Plan Goals being addressed?  Yes, continue treatment goals      Are Treatment Plan Strategies to Address Goals Effective?  Yes, continue treatment strategies      Are there any current contracts in place?  No        "

## 2017-09-03 NOTE — ADDENDUM NOTE
Encounter addended by: Dorothy Last RN on: 9/3/2017  2:40 PM<BR>     Actions taken: Flowsheet accepted

## 2017-09-03 NOTE — ADDENDUM NOTE
Encounter addended by: Dorothy Last RN on: 9/3/2017  2:23 PM<BR>     Actions taken: Flowsheet accepted

## 2017-09-05 ENCOUNTER — HOSPITAL ENCOUNTER (OUTPATIENT)
Dept: BEHAVIORAL HEALTH | Facility: CLINIC | Age: 42
End: 2017-09-05
Attending: PSYCHIATRY & NEUROLOGY
Payer: MEDICARE

## 2017-09-05 PROCEDURE — H2012 BEHAV HLTH DAY TREAT, PER HR: HCPCS

## 2017-09-05 NOTE — PROGRESS NOTES
"Adult Mental Health Outpatient Group Therapy Progress Note     Client Initial Individualized Goals for Treatment:\"Positive structure.  Concerned about backsliding.  I want to maintain where I am now because I am doing much better.  Continue to move forward.  Work towards finding a part time job- figure out what makes sense as first steps and what type of job to pursue first.\".       Treatment Goals from Individualized Treatment Plan:   1) Safety: Client will notify staff when needing assistance to develop or implement a coping plan to manage suicidal or self injurious urges.  Client will use coping plan for safety, as needed.  2) Symptom Management: Bernadine will process life stressors and identify ways to work through and problem solve current issues as they come up.  3) Life Skills: n life skills Bernadine will learn, explore, practice and apply 2-3 skills/strategies that promote mindfulness and help her establish routine and life balance specifically in area of finding employment. Report on progress weekly.   4) Wellness/D.c planning: Will improve wellness related behaviors by setting wellness goals weekly. Reporting on progress weekly     Area of Treatment Focus:  Symptom Management, Personal Safety and Develop Socialization / Interpersonal Relationship Skills    Therapeutic Interventions/Treatment Strategies:  Support, Feedback, Safety Assessments and Clarification    Response to Treatment Strategies:  Accepted Feedback, Gave Feedback, Listened, Focused on Goals, Attentive, Accepted Support and Alert    Name of Group:  psychotherapy     Description and Outcome:  Bernadine reports high level of fatigue.  She did not follow elimination diet last week, due to lack of planning.  She worked with mother and has planned and shopped for this week.  States that energy level, though low, is somewhat improved.  Has not yet contacted ex boyfriend regarding compensation for items of hers he has sold.  She states she has thought about " "what she will say and just needs to \"do it.\"  Expressed interest in DBT after peer shared the structure of the group she will be attending, including a psychotherapy portion.  No safety concerns.  Client demonstrated understanding of session content by developed plan for meals and for closure with ex.      Is this a Weekly Review of the Progress on the Treatment Plan?  No  "

## 2017-09-06 ENCOUNTER — HOSPITAL ENCOUNTER (OUTPATIENT)
Dept: BEHAVIORAL HEALTH | Facility: CLINIC | Age: 42
End: 2017-09-06
Attending: NURSE PRACTITIONER
Payer: MEDICARE

## 2017-09-06 PROCEDURE — H2012 BEHAV HLTH DAY TREAT, PER HR: HCPCS

## 2017-09-06 NOTE — PROGRESS NOTES
"Adult Mental Health Outpatient Group Therapy Progress Note     Client Initial Individualized Goals for Treatment:\"Positive structure.  Concerned about backsliding.  I want to maintain where I am now because I am doing much better.  Continue to move forward.  Work towards finding a part time job- figure out what makes sense as first steps and what type of job to pursue first.\".       Treatment Goals from Individualized Treatment Plan:   1) Safety: Client will notify staff when needing assistance to develop or implement a coping plan to manage suicidal or self injurious urges.  Client will use coping plan for safety, as needed.  2) Symptom Management: Bernadine will process life stressors and identify ways to work through and problem solve current issues as they come up.  3) Life Skills: n life skills Bernadine will learn, explore, practice and apply 2-3 skills/strategies that promote mindfulness and help her establish routine and life balance specifically in area of finding employment. Report on progress weekly.   4) Wellness/D.c planning: Will improve wellness related behaviors by setting wellness goals weekly. Reporting on progress weekly     Area of Treatment Focus:  Symptom Management, Personal Safety and Develop Socialization / Interpersonal Relationship Skills    Therapeutic Interventions/Treatment Strategies:  Support, Feedback, Safety Assessments and Clarification    Response to Treatment Strategies:  Accepted Feedback, Gave Feedback, Listened, Focused on Goals, Attentive, Accepted Support and Alert    Name of Group:  psychotherapy     Description and Outcome:  Bernadine shared that continues to experience fatigue.  However she was able to follow meal plan last night and started a run, but due to gastric reflux walked instead.  Identified frustration at limitations.  She is aware of importance of exercise in her mental health and is open to exploring options.  Agreed to goal of exploring silver sneaker options at " [employer].  Accepted feedback regarding self compassion and asked clarifying questions.  Offered support to peers.  No safety concerns.  Client verbalized understanding of session content by agreeing to goals for self care..      Is this a Weekly Review of the Progress on the Treatment Plan?  No

## 2017-09-08 ENCOUNTER — HOSPITAL ENCOUNTER (OUTPATIENT)
Dept: BEHAVIORAL HEALTH | Facility: CLINIC | Age: 42
End: 2017-09-08
Attending: NURSE PRACTITIONER
Payer: MEDICARE

## 2017-09-08 PROCEDURE — H2012 BEHAV HLTH DAY TREAT, PER HR: HCPCS

## 2017-09-08 NOTE — PROGRESS NOTES
"Adult Mental Health Outpatient Group Therapy Progress Note     Client Initial Individualized Goals for Treatment:\"Positive structure.  Concerned about backsliding.  I want to maintain where I am now because I am doing much better.  Continue to move forward.  Work towards finding a part time job- figure out what makes sense as first steps and what type of job to pursue first.\".       Treatment Goals from Individualized Treatment Plan:   1) Safety: Client will notify staff when needing assistance to develop or implement a coping plan to manage suicidal or self injurious urges.  Client will use coping plan for safety, as needed.  2) Symptom Management: Bernadine will process life stressors and identify ways to work through and problem solve current issues as they come up.  3) Life Skills: n life skills Bernadine will learn, explore, practice and apply 2-3 skills/strategies that promote mindfulness and help her establish routine and life balance specifically in area of finding employment. Report on progress weekly.   4) Wellness/D.c planning: Will improve wellness related behaviors by setting wellness goals weekly. Reporting on progress weekly       Area of Treatment Focus:  Symptom Management, Personal Safety and Develop Socialization / Interpersonal Relationship Skills    Therapeutic Interventions/Treatment Strategies:  Support, Feedback, Safety Assessments, Clarification and Education    Response to Treatment Strategies:  Accepted Feedback, Gave Feedback, Listened, Focused on Goals, Attentive, Accepted Support and Alert    Name of Group:  psychotherapy     Description and Outcome:  Bernadine shared that she had difficult conversation with mother yesterday.  Stated that mother had asked Bernadine for money to reimburse for living at parent's home.  Mother was not understanding  Of Bernadine not yet having job and also of ongoing financial concerns.  Bernadine states that parents have not been supportive of her struggles with mental illness, " they have resisted family therapy or ELI groups.  Bernadine acknowledged, after receiving support from peers, that she felt hurt by mother. No safety concerns.  Client demonstrated understanding of session content by asking for support from peers.  Client would benefit from additional opportunities to practice and implement content from this session, specifically ongoing skills to manage life stressors.    Is this a Weekly Review of the Progress on the Treatment Plan?  Yes.      Are Treatment Plan Goals being addressed?  Yes, continue treatment goals      Are Treatment Plan Strategies to Address Goals Effective?  Yes, continue treatment strategies      Are there any current contracts in place?  No

## 2017-09-08 NOTE — PROGRESS NOTES
"Adult Mental Health Outpatient Group Therapy Progress Note     Client Initial Individualized Goals for Treatment: \"Positive structure.  Concerned about backsliding.  I want to maintain where I am now because I am doing much better.  Continue to move forward.  Work towards finding a part time job- figure out what makes sense as first steps and what type of job to pursue first.\".     See Initial Treatment suggestions for the client during the time between Diagnostic Assessment and completion of the Master Individualized Treatment Plan.    Treatment Goals:  1) Safety: Client will notify staff when needing assistance to develop or implement a coping plan to manage suicidal or self injurious urges.  Client will use coping plan for safety, as needed.  2) Symptom Management: Bernadine will process life stressors and identify ways to work through and problem solve current issues as they come up.  3) Life Skills: n life skills Bernadine will learn, explore, practice and apply 2-3 skills/strategies that promote mindfulness and help her establish routine and life balance specifically in area of finding employment. Report on progress weekly.   4) Wellness/D.c planning: Will improve wellness related behaviors by setting wellness goals weekly. Reporting on progress weekly     Area of Treatment Focus:  Symptom Management    Therapeutic Interventions/Treatment Strategies:  Support, Feedback, Safety Assessments, Structured Activity and Education    Response to Treatment Strategies:  Accepted Feedback, Listened, Attentive, Accepted Support and Alert    Name of Group:  Mental Health Management     Description and Outcome:  Client presented with calm,even mood.Good focus and concentration during a discussion related to assertive communication and strategies for self improvement.Client reported that she wants to learn more about persuasive communication. Client also talked about the stress/fear she experiences when going through the job process " from application to interview.   Client would benefit from additional opportunities to practice and implement content from this session in everyday life,interpersonal relationships and mental health recovery.    Is this a Weekly Review of the Progress on the Treatment Plan?  Yes.      Are Treatment Plan Goals being addressed?  Yes, continue treatment goals      Are Treatment Plan Strategies to Address Goals Effective?  Yes, continue treatment strategies      Are there any current contracts in place?  No

## 2017-09-08 NOTE — PROGRESS NOTES
"Adult Mental Health Outpatient Group Therapy Progress Note     Date: 9/06/17  Time: 10:00-10:50    Client Initial Individualized Goals for Treatment:\"Positive structure.  Concerned about backsliding.  I want to maintain where I am now because I am doing much better.  Continue to move forward.  Work towards finding a part time job- figure out what makes sense as first steps and what type of job to pursue first.\".      Treatment Goals from Individualized Treatment Plan:   1) Safety: Client will notify staff when needing assistance to develop or implement a coping plan to manage suicidal or self injurious urges.  Client will use coping plan for safety, as needed.  2) Symptom Management: Bernadine will process life stressors and identify ways to work through and problem solve current issues as they come up.  3) Life Skills: n life skills Bernadine will learn, explore, practice and apply 2-3 skills/strategies that promote mindfulness and help her establish routine and life balance specifically in area of finding employment. Report on progress weekly.   4) Wellness/D.c planning: Will improve wellness related behaviors by setting wellness goals weekly. Reporting on progress weekly      Area of Treatment Focus:  Symptom Management, Personal Safety, Community Resources/Discharge Planning, Develop / Improve Independent Living Skills and Develop Socialization / Interpersonal Relationship Skills    Therapeutic Interventions/Treatment Strategies:  Support, Redirection, Feedback, Limit/Boundaries, Safety Assessments, Structured Activity, Problem Solving, Clarification and Education    Response to Treatment Strategies:  Gave Feedback, Listened, Attentive and Alert    Name of Group:  Self support skills     Description and Outcome: Bernadine attended and participated in a structured self-support skills group where intervention focuses on learning, developing, and practicing coping and life skills and strategies through structured experiential " psycho-education to improve function in valued roles, routines, relationships, and independent living skills. Today the focus continues on effective communication skills in context of experiential structured group activity. Group members are encouraged to practice and use concepts taught in last session (DBT: CATALINO) with each other providing feedback to each other and problem solving. Bernadine is engaged. Affect even.  Bernadine demonstrates understanding by applying/generalizing the skill appropriately to in the context of group activity.     Treatment Planning Meetings:  9/29/17:  9/01/17: Team members met with Bernadine, discussed program, process, progress and set treatment goals with her. See Individualized treatment goals for details.   : Absent.  8/07/17: first day in program.    Is this a Weekly Review of the Progress on the Treatment Plan?  No.

## 2017-09-08 NOTE — PROGRESS NOTES
"Adult Mental Health Outpatient Group Therapy Progress Note     Date: 9/05/17  Time: 10:00-10:50    Client Initial Individualized Goals for Treatment:\"Positive structure.  Concerned about backsliding.  I want to maintain where I am now because I am doing much better.  Continue to move forward.  Work towards finding a part time job- figure out what makes sense as first steps and what type of job to pursue first.\".      Treatment Goals from Individualized Treatment Plan:   1) Safety: Client will notify staff when needing assistance to develop or implement a coping plan to manage suicidal or self injurious urges.  Client will use coping plan for safety, as needed.  2) Symptom Management: Bernadine will process life stressors and identify ways to work through and problem solve current issues as they come up.  3) Life Skills: n life skills Bernadine will learn, explore, practice and apply 2-3 skills/strategies that promote mindfulness and help her establish routine and life balance specifically in area of finding employment. Report on progress weekly.   4) Wellness/D.c planning: Will improve wellness related behaviors by setting wellness goals weekly. Reporting on progress weekly      Area of Treatment Focus:  Symptom Management, Personal Safety, Community Resources/Discharge Planning, Develop / Improve Independent Living Skills and Develop Socialization / Interpersonal Relationship Skills    Therapeutic Interventions/Treatment Strategies:  Support, Redirection, Feedback, Limit/Boundaries, Safety Assessments, Structured Activity, Problem Solving, Clarification and Education    Response to Treatment Strategies:  Gave Feedback, Listened, Attentive and Alert    Name of Group:  Self support skills     Description and Outcome: Bernadine attended and participated in a structured self-support skills group where intervention focuses on learning, developing, and practicing coping and life skills and strategies through structured experiential " psycho-education to improve function in valued roles, routines, relationships, and independent living skills. Opening of session included an brief centering practice of Carlos Chi, which is an evidenced based practice to promote mindfulness and improve brain wave activity for improved mental wellbeing, in addition the physical benefits of flexibility and balance.  The purpose, to provide group members an opportunity to experience a mindfulness based activity and experience the impact of it on their nervous system first hand. This was followed by psycho-education, discussion, practice, and application of skills focused on personal boundaries and assertiveness using the evidence based DBT skill of CATALINO.  Guidance through education, exploration, discussion, and application of concepts to a current relationship stressor to increase ability to generalize the skill. Bernadine demonstrates understanding by applying the skill appropriately to a current relationship issue. Validation provided. Affect even.    Treatment Planning Meetings:  9/29/17:  9/01/17: Team members met with Bernadine, discussed program, process, progress and set treatment goals with her. See Individualized treatment goals for details.   : Absent.  8/07/17: first day in program.    Is this a Weekly Review of the Progress on the Treatment Plan?  No.

## 2017-09-11 NOTE — PROGRESS NOTES
"Adult Mental Health Outpatient Group Therapy Progress Note     Date: 9/08/17  Time: 10:00-10:50    Client Initial Individualized Goals for Treatment:\"Positive structure.  Concerned about backsliding.  I want to maintain where I am now because I am doing much better.  Continue to move forward.  Work towards finding a part time job- figure out what makes sense as first steps and what type of job to pursue first.\".      Treatment Goals from Individualized Treatment Plan:   1) Safety: Client will notify staff when needing assistance to develop or implement a coping plan to manage suicidal or self injurious urges.  Client will use coping plan for safety, as needed.  2) Symptom Management: Bernadine will process life stressors and identify ways to work through and problem solve current issues as they come up.  3) Life Skills: n life skills Bernadine will learn, explore, practice and apply 2-3 skills/strategies that promote mindfulness and help her establish routine and life balance specifically in area of finding employment. Report on progress weekly.   4) Wellness/D.c planning: Will improve wellness related behaviors by setting wellness goals weekly. Reporting on progress weekly      Area of Treatment Focus:  Symptom Management, Personal Safety, Community Resources/Discharge Planning, Develop / Improve Independent Living Skills and Develop Socialization / Interpersonal Relationship Skills    Therapeutic Interventions/Treatment Strategies:  Support, Redirection, Feedback, Limit/Boundaries, Safety Assessments, Structured Activity, Problem Solving, Clarification and Education    Response to Treatment Strategies:  Gave Feedback, Listened, Attentive and Alert    Name of Group:  Self support skills     Description and Outcome: Bernadine attended and participated in a structured self-support skills group where intervention focuses on learning, developing, and practicing coping and life skills and strategies through structured experiential " psycho-education to improve function in valued roles, routines, relationships, and independent living skills. Today the group was led through the process of weekly reflection and weekend wellness planning. Bernadine does a good job of engaging and demonstrates understanding of the purpose of the content. She is able to share with peers and also provide them with support and validation.  Affect even.  Bernadine demonstrates understanding by participating fully in the group process today.    Treatment Planning Meetings:  9/29/17:  9/01/17: Team members met with Bernadine, discussed program, process, progress and set treatment goals with her. See Individualized treatment goals for details.   : Absent.  8/07/17: first day in program.    Is this a Weekly Review of the Progress on the Treatment Plan?  Yes.      Are Treatment Plan Goals being addressed?  Yes, continue treatment goals      Are Treatment Plan Strategies to Address Goals Effective?  Yes, continue treatment strategies      Are there any current contracts in place?  No

## 2017-09-15 ENCOUNTER — HOSPITAL ENCOUNTER (OUTPATIENT)
Dept: BEHAVIORAL HEALTH | Facility: CLINIC | Age: 42
End: 2017-09-15
Attending: NURSE PRACTITIONER
Payer: MEDICARE

## 2017-09-15 PROCEDURE — H2012 BEHAV HLTH DAY TREAT, PER HR: HCPCS

## 2017-09-15 NOTE — PROGRESS NOTES
"Adult Mental Health Outpatient Group Therapy Progress Note     Date: 9/15/17  Time: 10:00-10:50    Client Initial Individualized Goals for Treatment:\"Positive structure.  Concerned about backsliding.  I want to maintain where I am now because I am doing much better.  Continue to move forward.  Work towards finding a part time job- figure out what makes sense as first steps and what type of job to pursue first.\".      Treatment Goals from Individualized Treatment Plan:   1) Safety: Client will notify staff when needing assistance to develop or implement a coping plan to manage suicidal or self injurious urges.  Client will use coping plan for safety, as needed.  2) Symptom Management: Bernadine will process life stressors and identify ways to work through and problem solve current issues as they come up.  3) Life Skills: n life skills Bernadine will learn, explore, practice and apply 2-3 skills/strategies that promote mindfulness and help her establish routine and life balance specifically in area of finding employment. Report on progress weekly.   4) Wellness/D.c planning: Will improve wellness related behaviors by setting wellness goals weekly. Reporting on progress weekly      Area of Treatment Focus:  Symptom Management, Personal Safety, Community Resources/Discharge Planning, Develop / Improve Independent Living Skills and Develop Socialization / Interpersonal Relationship Skills    Therapeutic Interventions/Treatment Strategies:  Support, Redirection, Feedback, Limit/Boundaries, Safety Assessments, Structured Activity, Problem Solving, Clarification and Education    Response to Treatment Strategies:  Gave Feedback, Listened, Attentive and Alert    Name of Group:  Self support skills     Description and Outcome: Bernadine attended and participated in a structured self-support skills group where intervention focuses on learning, developing, and practicing coping and life skills and strategies through structured experiential " psycho-education to improve function in valued roles, routines, relationships, and independent living skills. Today the group focus was on values identification and identifying ways to live more fully in their top values. Naomi participates fully and mindfully engages in the structured activity. She is able to identify her top personal values, her strengths from those, and specify 3 ways she can work towards living more fully in her values. Also provided was the facilitation of the evidence based practice of Carlos Chi which this group has been given introductory instruction for the purpose of experiential engagement in a mindfulness based activity for stress reduction. Bernadine does a good job of engaging and demonstrates understanding of the purpose of the content. She is able to share with peers and also provide them with support and validation.  Affect even. She worked towards goal number 3 today.      Treatment Planning Meetings:  9/29/17:  9/01/17: Team members met with Bernadine, discussed program, process, progress and set treatment goals with her. See Individualized treatment goals for details.   : Absent.  8/07/17: first day in program.    Is this a Weekly Review of the Progress on the Treatment Plan?  Yes.      Are Treatment Plan Goals being addressed?  Yes, continue treatment goals      Are Treatment Plan Strategies to Address Goals Effective?  Yes, continue treatment strategies      Are there any current contracts in place?  No

## 2017-09-15 NOTE — PROGRESS NOTES
"Adult Mental Health Outpatient Group Therapy Progress Note     Client Initial Individualized Goals for Treatment:\"Positive structure.  Concerned about backsliding.  I want to maintain where I am now because I am doing much better.  Continue to move forward.  Work towards finding a part time job- figure out what makes sense as first steps and what type of job to pursue first.\".       Treatment Goals from Individualized Treatment Plan:   1) Safety: Client will notify staff when needing assistance to develop or implement a coping plan to manage suicidal or self injurious urges.  Client will use coping plan for safety, as needed.  2) Symptom Management: Bernadine will process life stressors and identify ways to work through and problem solve current issues as they come up.  3) Life Skills: n life skills Bernadine will learn, explore, practice and apply 2-3 skills/strategies that promote mindfulness and help her establish routine and life balance specifically in area of finding employment. Report on progress weekly.   4) Wellness/D.c planning: Will improve wellness related behaviors by setting wellness goals weekly. Reporting on progress weekly     Area of Treatment Focus:  Symptom Management, Personal Safety and Develop Socialization / Interpersonal Relationship Skills    Therapeutic Interventions/Treatment Strategies:  Support, Feedback, Safety Assessments, Clarification and Education    Response to Treatment Strategies:  Accepted Feedback, Gave Feedback, Listened, Focused on Goals, Attentive, Accepted Support and Alert    Name of Group:  psychotherapy     Description and Outcome:  Bernadine states that she was absent earlier this week due to cold symptoms.  Has been struggling with energy and follow through.  Shared that she was upset by interaction with mom.  States that she processed this yesterday with individual therapist.  Understands that she has limited control in changing mother's behavior.  Is accepting feedback that " mother may have underlying fears/concerns and that is reason for some comments.  Bernadine acknowledges history of alcohol abuse and shares that boyfriend uses alcohol and perhaps mother is concerned about this.    Client verbalized understanding of session content by acknowledging boundaries with mother and practising empathy..  Client would benefit from additional opportunities to practice and implement content from this session.  No safety concerns.    Is this a Weekly Review of the Progress on the Treatment Plan?  Yes.      Are Treatment Plan Goals being addressed?  Yes, continue treatment goals      Are Treatment Plan Strategies to Address Goals Effective?  Yes, continue treatment strategies      Are there any current contracts in place?  No

## 2017-09-20 ENCOUNTER — HOSPITAL ENCOUNTER (OUTPATIENT)
Dept: BEHAVIORAL HEALTH | Facility: CLINIC | Age: 42
End: 2017-09-20
Attending: NURSE PRACTITIONER
Payer: MEDICARE

## 2017-09-20 PROCEDURE — H2012 BEHAV HLTH DAY TREAT, PER HR: HCPCS

## 2017-09-21 NOTE — PROGRESS NOTES
"Adult Mental Health Outpatient Group Therapy Progress Note     Date: 9/20/17  Time: 10:00-10:50    Client Initial Individualized Goals for Treatment:\"Positive structure.  Concerned about backsliding.  I want to maintain where I am now because I am doing much better.  Continue to move forward.  Work towards finding a part time job- figure out what makes sense as first steps and what type of job to pursue first.\".      Treatment Goals from Individualized Treatment Plan:   1) Safety: Client will notify staff when needing assistance to develop or implement a coping plan to manage suicidal or self injurious urges.  Client will use coping plan for safety, as needed.  2) Symptom Management: Bernadine will process life stressors and identify ways to work through and problem solve current issues as they come up.  3) Life Skills: n life skills Bernadine will learn, explore, practice and apply 2-3 skills/strategies that promote mindfulness and help her establish routine and life balance specifically in area of finding employment. Report on progress weekly.   4) Wellness/D.c planning: Will improve wellness related behaviors by setting wellness goals weekly. Reporting on progress weekly      Area of Treatment Focus:  Symptom Management, Personal Safety, Community Resources/Discharge Planning, Develop / Improve Independent Living Skills and Develop Socialization / Interpersonal Relationship Skills    Therapeutic Interventions/Treatment Strategies:  Support, Redirection, Feedback, Limit/Boundaries, Safety Assessments, Structured Activity, Problem Solving, Clarification and Education    Response to Treatment Strategies:  Gave Feedback, Listened, Attentive and Alert    Name of Group:  Self support skills     Description and Outcome: Bernadine attended and participated in a structured self-support skills group where intervention focuses on learning, developing, and practicing coping and life skills and strategies through structured experiential " psycho-education to improve function in valued roles, routines, relationships, and independent living skills.     Today the topic was on the benefits of engagement in sensory enhanced activity for improved self-awareness and to practice focused attention. Bernadine is able to follow along in the structured experiential process with good focus. She shares she realizes she has little patience for some activities and wishes she could do more of others in her weekly schedule. She problem solved possibilities to incorporate the activity into her life in simple non-formal ways. Bernadine does a good job of engaging and demonstrates understanding of the purpose of the content. She is able to share with peers and also provide them with support and validation.  Affect even. She worked towards goal number 3 today.      Treatment Planning Meetings:  9/29/17:  9/01/17: Team members met with Bernadine, discussed program, process, progress and set treatment goals with her. See Individualized treatment goals for details.   : Absent.  8/07/17: first day in program.    Is this a Weekly Review of the Progress on the Treatment Plan?  No.

## 2017-09-22 ENCOUNTER — HOSPITAL ENCOUNTER (OUTPATIENT)
Dept: BEHAVIORAL HEALTH | Facility: CLINIC | Age: 42
End: 2017-09-22
Attending: NURSE PRACTITIONER
Payer: MEDICARE

## 2017-09-22 PROCEDURE — H2012 BEHAV HLTH DAY TREAT, PER HR: HCPCS

## 2017-09-22 NOTE — PROGRESS NOTES
Past Medical History:   Diagnosis Date     Depression     In counseling; has Psychiatrist     Depressive disorder      Current Outpatient Prescriptions   Medication     BuPROPion HCl (WELLBUTRIN PO)     lamoTRIgine (LAMICTAL) 150 MG tablet     levothyroxine (SYNTHROID/LEVOTHROID) 50 MCG tablet     Vilazodone HCl (VIIBRYD PO)     norethindrone-ethinyl estradiol (MICROGESTIN 1.5/30) 1.5-30 MG-MCG per tablet     etonogestrel-ethinyl estradiol (NUVARING) 0.12-0.015 MG/24HR vaginal ring     No current facility-administered medications for this encounter.    Psychiatry staffing: case discussed  Diagnosis:  Bipolar I, stabilizing.  Fair attendance.

## 2017-09-22 NOTE — PROGRESS NOTES
"Adult Mental Health Outpatient Group Therapy Progress Note     Client Initial Individualized Goals for Treatment: \"Positive structure.  Concerned about backsliding.  I want to maintain where I am now because I am doing much better.  Continue to move forward.  Work towards finding a part time job- figure out what makes sense as first steps and what type of job to pursue first.\".     See Initial Treatment suggestions for the client during the time between Diagnostic Assessment and completion of the Master Individualized Treatment Plan.    Treatment Goals:  1) Safety: Client will notify staff when needing assistance to develop or implement a coping plan to manage suicidal or self injurious urges.  Client will use coping plan for safety, as needed.  2) Symptom Management: Bernadine will process life stressors and identify ways to work through and problem solve current issues as they come up.  3) Life Skills: n life skills Bernadine will learn, explore, practice and apply 2-3 skills/strategies that promote mindfulness and help her establish routine and life balance specifically in area of finding employment. Report on progress weekly.   4) Wellness/D.c planning: Will improve wellness related behaviors by setting wellness goals weekly. Reporting on progress weekly     Area of Treatment Focus:  Symptom Management    Therapeutic Interventions/Treatment Strategies:  Support, Feedback, Safety Assessments, Structured Activity and Education    Response to Treatment Strategies:  Accepted Feedback, Listened, Attentive, Accepted Support and Alert    Name of Group:  Mental Health Management     Description and Outcome:  Client presented with calm,even mood.Good focus and concentration during a discussion related to body image/self image and strategies for self improvement which focused on health and physical fitness. Client acknowledged that how others have been judgmental towards her from a physical appearance point of view.  Client would " benefit from additional opportunities to practice and implement content from this session in everyday life,interpersonal relationships and mental health recovery.    Is this a Weekly Review of the Progress on the Treatment Plan?  Yes.      Are Treatment Plan Goals being addressed?  Yes, continue treatment goals      Are Treatment Plan Strategies to Address Goals Effective?  Yes, continue treatment strategies      Are there any current contracts in place?  No

## 2017-09-26 ENCOUNTER — HOSPITAL ENCOUNTER (OUTPATIENT)
Dept: BEHAVIORAL HEALTH | Facility: CLINIC | Age: 42
End: 2017-09-26
Attending: NURSE PRACTITIONER
Payer: MEDICARE

## 2017-09-26 PROCEDURE — H2012 BEHAV HLTH DAY TREAT, PER HR: HCPCS

## 2017-10-02 NOTE — PROGRESS NOTES
"Adult Mental Health Outpatient Group Therapy Progress Note     Client Initial Individualized Goals for Treatment:\"Positive structure.  Concerned about backsliding.  I want to maintain where I am now because I am doing much better.  Continue to move forward.  Work towards finding a part time job- figure out what makes sense as first steps and what type of job to pursue first.\".      Treatment Goals from Individualized Treatment Plan:   1) Safety: Client will notify staff when needing assistance to develop or implement a coping plan to manage suicidal or self injurious urges.  Client will use coping plan for safety, as needed.  2) Symptom Management: Bernadine will process life stressors and identify ways to work through and problem solve current issues as they come up.  3) Life Skills: in life skills Bernadine will learn, explore, practice and apply 2-3 skills/strategies that promote mindfulness and help her establish routine and life balance specifically in area of finding employment. Report on progress weekly.   4) Wellness/D.C planning: Will improve wellness related behaviors by setting wellness goals weekly. Reporting on progress weekly     See Initial Treatment suggestions for the client during the time between Diagnostic Assessment and completion of the Master Individualized Treatment Plan.    Area of Treatment Focus:  Symptom Management and Physical Health     Therapeutic Interventions/Treatment Strategies:  Support, Feedback and Cognitive Behavioral Therapy    Response to Treatment Strategies:  Accepted Feedback and Gave Feedback    Name of Group:  Group Psychotherapy    Description and Outcome:  Bernadine joined the 2A group (merger).  She was quiet for the first part of the session, and appeared to be following the conversation but offered no feedback.  Checking in with her she said she was feeling physically ill (nausea), and initially said she had little to talk about.  She teared up and the group encouraged her to " share.  Bernadine shared her history of physical health issues, focusing on a past diagnosis of chronic fatigue, and her current gastrointestinal distress.  The group validated her feelings, and offered feedback and support.  Bernadine seemed to become more animated and engaged as the group progressed.  She accepted and offered feedback.    Client demonstrated understanding of session content by sharing personal information with an unfamiliar but supportive group and processing her thoughts and feelings in session.    Is this a Weekly Review of the Progress on the Treatment Plan?  No

## 2017-10-03 ENCOUNTER — HOSPITAL ENCOUNTER (OUTPATIENT)
Dept: BEHAVIORAL HEALTH | Facility: CLINIC | Age: 42
End: 2017-10-03
Attending: NURSE PRACTITIONER
Payer: MEDICARE

## 2017-10-03 DIAGNOSIS — Z78.9 USES BIRTH CONTROL: ICD-10-CM

## 2017-10-03 PROCEDURE — H2012 BEHAV HLTH DAY TREAT, PER HR: HCPCS

## 2017-10-03 NOTE — PROGRESS NOTES
"Adult Mental Health Outpatient Group Therapy Progress Note     Date: 10/03/17  Time: 11:00-11:50    Client Initial Individualized Goals for Treatment:\"Positive structure.  Concerned about backsliding.  I want to maintain where I am now because I am doing much better.  Continue to move forward.  Work towards finding a part time job- figure out what makes sense as first steps and what type of job to pursue first.\".      Treatment Goals from Individualized Treatment Plan:   1) Safety: Client will notify staff when needing assistance to develop or implement a coping plan to manage suicidal or self injurious urges.  Client will use coping plan for safety, as needed.  2) Symptom Management: Bernadine will process life stressors and identify ways to work through and problem solve current issues as they come up.  3) Life Skills: n life skills Bernadine will learn, explore, practice and apply 2-3 skills/strategies that promote mindfulness and help her establish routine and life balance specifically in area of finding employment. Report on progress weekly.   4) Wellness/D.c planning: Will improve wellness related behaviors by setting wellness goals weekly. Reporting on progress weekly      Area of Treatment Focus:  Symptom Management, Personal Safety, Community Resources/Discharge Planning, Develop / Improve Independent Living Skills and Develop Socialization / Interpersonal Relationship Skills    Therapeutic Interventions/Treatment Strategies:  Support, Redirection, Feedback, Limit/Boundaries, Safety Assessments, Structured Activity, Problem Solving, Clarification and Education    Response to Treatment Strategies:  Gave Feedback, Listened, Attentive and Alert    Name of Group:  Self support skills     Description and Outcome: Bernadine attended and participated in a structured self-support skills group where intervention focuses on learning, developing, and practicing coping and life skills and strategies through structured experiential " psycho-education to improve function in valued roles, routines, relationships, and independent living skills.     Today the topic of the group was distress tolerance. Psychoeducational review of DBT skill of paired muscle relaxation. A step by step guided facilitation of the process was provided to group members and an opportunity to practice the skill gain self awareness on their responses to the muscle activation/breathing/relaxing. Bernadine is engaged, remembers this skill, she is self-regulated during this session but does become labile during treatment planning meeting.  Bernadine continues with not feeling physically well, low energy, validation for coming today and support provided. Bernadine would benefit from additional opportunities to practice and implement content from this session.    Treatment Planning Meetings:  10/03/17: Team met with Bernadine, discussed progress. See Treatment Plan for details.   9/01/17: Team members met with Bernadine, discussed program, process, progress and set treatment goals with her. See Individualized treatment goals for details.   : Absent.  8/07/17: first day in program.    Is this a Weekly Review of the Progress on the Treatment Plan?  No.

## 2017-10-04 ENCOUNTER — HOSPITAL ENCOUNTER (OUTPATIENT)
Dept: BEHAVIORAL HEALTH | Facility: CLINIC | Age: 42
End: 2017-10-04
Attending: NURSE PRACTITIONER
Payer: MEDICARE

## 2017-10-04 PROCEDURE — H2012 BEHAV HLTH DAY TREAT, PER HR: HCPCS

## 2017-10-04 RX ORDER — ETONOGESTREL/ETHINYL ESTRADIOL .12-.015MG
RING, VAGINAL VAGINAL
Refills: 0 | OUTPATIENT
Start: 2017-10-04

## 2017-10-04 NOTE — TELEPHONE ENCOUNTER
etonogestrel-ethinyl estradiol (NUVARING) 0.12-0.015 MG/24HR vaginal ring     Last Written Prescription Date:  2/25/16  Last Fill Quantity: 3,   # refills: 3  Last Office Visit with [facility] primary care provider:  2/25/16  Future Office visit: none    Routing refill request to provider for review/approval because:  Rx denied. Pt needs appointment. Last annual 2/25/16

## 2017-10-04 NOTE — PROGRESS NOTES
"Adult Mental Health Outpatient Group Therapy Progress Note     Client Initial Individualized Goals for Treatment:\"Positive structure.  Concerned about backsliding.  I want to maintain where I am now because I am doing much better.  Continue to move forward.  Work towards finding a part time job- figure out what makes sense as first steps and what type of job to pursue first.\".       Treatment Goals from Individualized Treatment Plan:   1) Safety: Client will notify staff when needing assistance to develop or implement a coping plan to manage suicidal or self injurious urges.  Client will use coping plan for safety, as needed.  2) Symptom Management: Bernadine will process life stressors and identify ways to work through and problem solve current issues as they come up.  3) Life Skills: n life skills Bernadine will learn, explore, practice and apply 2-3 skills/strategies that promote mindfulness and help her establish routine and life balance specifically in area of finding employment. Report on progress weekly.   4) Wellness/D.c planning: Will improve wellness related behaviors by setting wellness goals weekly. Reporting on progress weekly       Area of Treatment Focus:  Symptom Management    Therapeutic Interventions/Treatment Strategies:  Support, Feedback, Structured Activity, Clarification and Education    Response to Treatment Strategies:  Accepted Feedback, Gave Feedback, Listened, Focused on Goals, Attentive, Accepted Support and Alert    Name of Group:  Mental health management     Description and Outcome:  The group participated in a structured, psychoeducational discussion and activity comparing and contrasting self esteem and self compassion.  Self esteem was presented as a useful, but limiting construct, as if is prone to change with circumstances and relies on comparisons to others.  On the other hand, self compassion can be described as breathing the self kindly, especially during struggle.  Self compassion " "facilitates connection to others.  Handouts, including exercises to practise self compassion, were given. Bernadine was engaged.  Shared that she struggles with \"bad choices\" she has made and balancing personal responsibility with self compassion.    Client demonstrated understanding of session content by above.  Client would benefit from additional opportunities to practice and implement content from this session.      Is this a Weekly Review of the Progress on the Treatment Plan?  No  "

## 2017-10-04 NOTE — PROGRESS NOTES
"Adult Mental Health Outpatient Group Therapy Progress Note        Client Initial Individualized Goals for Treatment:\"Positive structure.  Concerned about backsliding.  I want to maintain where I am now because I am doing much better.  Continue to move forward.  Work towards finding a part time job- figure out what makes sense as first steps and what type of job to pursue first.\".      Treatment Goals from Individualized Treatment Plan:   1) Safety: Client will notify staff when needing assistance to develop or implement a coping plan to manage suicidal or self injurious urges.  Client will use coping plan for safety, as needed.  2) Symptom Management: Bernadine will process life stressors and identify ways to work through and problem solve current issues as they come up.  3) Life Skills: n life skills Bernadine will learn, explore, practice and apply 2-3 skills/strategies that promote mindfulness and help her establish routine and life balance specifically in area of finding employment. Report on progress weekly.   4) Wellness/D.c planning: Will improve wellness related behaviors by setting wellness goals weekly. Reporting on progress weekly     Area of Treatment Focus:  Symptom Management, Personal Safety and Community Resources/Discharge Planning    Therapeutic Interventions/Treatment Strategies:  Support, Feedback, Safety Assessments, Clarification, Education and Cognitive Behavioral Therapy    Response to Treatment Strategies:  Accepted Feedback, Gave Feedback, Listened, Attentive and Alert, Needed some redirection    Name of Group:  Group Psychotherapy at 10:00    Description and Outcome:   Bernadine reported that she was not feeling well over the weekend.  Last week she was not feeling well.  Her mood is really down and she is frustrated by everything.  She has stopped exercising and she has stopped eating in healthy ways.  She does not feel like she is able to accomplish anything anymore.  She also wonders if she will " ever feel like herself again.  She acknowledges hopeless and anger.  Currently Bernadine is feeling stuck.  It is hard in the group setting because when she given feedback or support it is rejected by her.  Her goal #2 is to discuss her stressors and then work on problem solving.  Currently she is very help rejecting of others.  She is also a very strong critic of herself.  Nothing she does is good enough and she is in constant argument mode with others.  She denies suicidal ideation but is unhappy with her life.  Bernadine would benefit from additional opportunities to practice and implement content from this session if she is willing.  Willfulness could be a topic of discussion that might be helpful for the client.      Is this a Weekly Review of the Progress on the Treatment Plan?  Yes.      Are Treatment Plan Goals being addressed?  Yes, continue treatment goals      Are Treatment Plan Strategies to Address Goals Effective?  Yes, continue treatment strategies      Are there any current contracts in place?  No

## 2017-10-04 NOTE — PROGRESS NOTES
"Adult Mental Health Outpatient Group Therapy Progress Note        Client Initial Individualized Goals for Treatment:\"Positive structure.  Concerned about backsliding.  I want to maintain where I am now because I am doing much better.  Continue to move forward.  Work towards finding a part time job- figure out what makes sense as first steps and what type of job to pursue first.\".      Treatment Goals from Individualized Treatment Plan:   1) Safety: Client will notify staff when needing assistance to develop or implement a coping plan to manage suicidal or self injurious urges.  Client will use coping plan for safety, as needed.  2) Symptom Management: Bernadine will process life stressors and identify ways to work through and problem solve current issues as they come up.  3) Life Skills: n life skills Bernadine will learn, explore, practice and apply 2-3 skills/strategies that promote mindfulness and help her establish routine and life balance specifically in area of finding employment. Report on progress weekly.   4) Wellness/D.c planning: Will improve wellness related behaviors by setting wellness goals weekly. Reporting on progress weekly     Area of Treatment Focus:  Symptom Management, Personal Safety and Community Resources/Discharge Planning    Therapeutic Interventions/Treatment Strategies:  Support, Feedback, Safety Assessments, Clarification, Education and Cognitive Behavioral Therapy    Response to Treatment Strategies:  Accepted Feedback, Gave Feedback, Listened, Attentive and Alert, Needed some redirection    Name of Group:  Group Psychotherapy at 9:00    Description and Outcome:   Bernadine reported that she was not sleeping still.  She also reported that it is her attitude that is getting in her way.  She then mentioned her practiced of avoiding stuff that is bothersome or unpleasant.  Her goal #2 is to discuss her stressors and then work on problem solving. The avoidance that she is admitting to is not helping " her at all when it comes to problem solving.  She did acknowledge that she is feeling some fear too about what she is avoiding.  She denies suicidal ideation but is unhappy with her life. Today she started to cry about her life status. Bernadine would benefit from additional opportunities to practice and implement content from this session if she is willing to go deeper.  Willfulness and avoidance are topics of discussion that will be helpful for the Bernadine when she is ready to look at them.      Is this a Weekly Review of the Progress on the Treatment Plan?  No.      Are Treatment Plan Goals being addressed?  Yes, continue treatment goals      Are Treatment Plan Strategies to Address Goals Effective?  Yes, continue treatment strategies      Are there any current contracts in place?  No

## 2017-10-05 NOTE — PROGRESS NOTES
"Adult Mental Health Outpatient Group Therapy Progress Note      Client Initial Individualized Goals for Treatment:\"Positive structure.  Concerned about backsliding.  I want to maintain where I am now because I am doing much better.  Continue to move forward.  Work towards finding a part time job- figure out what makes sense as first steps and what type of job to pursue first.\".      Treatment Goals from Individualized Treatment Plan:   1) Safety: Client will notify staff when needing assistance to develop or implement a coping plan to manage suicidal or self injurious urges.  Client will use coping plan for safety, as needed.  2) Symptom Management: Bernadine will process life stressors and identify ways to work through and problem solve current issues as they come up.  3) Life Skills: n life skills Bernadine will learn, explore, practice and apply 2-3 skills/strategies that promote mindfulness and help her establish routine and life balance specifically in area of finding employment. Report on progress weekly.   4) Wellness/D.c planning: Will improve wellness related behaviors by setting wellness goals weekly. Reporting on progress weekly       Area of Treatment Focus:  Symptom Management, Personal Safety and Community Resources/Discharge Planning    Therapeutic Interventions/Treatment Strategies:  Structured Activity and Education    Response to Treatment Strategies:  Listened and Focused on Goals    Name of Group:  Mental Health Management     Description and Outcome:  Client participated in an educational activity about managing worry.  Client identified current areas that increase worry.  Client then completed a worry stopper exercise to identify worry that is in or out of one s control.   The client then rated the level of importance of each or these concerns.  The client identified skills to use on each type of worry.    Client demonstrated understanding of session content by Sharing worry topics and completing the " exercise.      Is this a Weekly Review of the Progress on the Treatment Plan?  No

## 2017-10-06 ENCOUNTER — HOSPITAL ENCOUNTER (OUTPATIENT)
Dept: BEHAVIORAL HEALTH | Facility: CLINIC | Age: 42
End: 2017-10-06
Attending: NURSE PRACTITIONER
Payer: MEDICARE

## 2017-10-06 PROCEDURE — H2012 BEHAV HLTH DAY TREAT, PER HR: HCPCS

## 2017-10-06 PROCEDURE — 99213 OFFICE O/P EST LOW 20 MIN: CPT | Performed by: NURSE PRACTITIONER

## 2017-10-06 NOTE — PROGRESS NOTES
"Adult Mental Health Outpatient Group Therapy Progress Note        Client Initial Individualized Goals for Treatment:\"Positive structure.  Concerned about backsliding.  I want to maintain where I am now because I am doing much better.  Continue to move forward.  Work towards finding a part time job- figure out what makes sense as first steps and what type of job to pursue first.\".      Treatment Goals from Individualized Treatment Plan:   1) Safety: Client will notify staff when needing assistance to develop or implement a coping plan to manage suicidal or self injurious urges.  Client will use coping plan for safety, as needed.  2) Symptom Management: Bernadine will process life stressors and identify ways to work through and problem solve current issues as they come up.  3) Life Skills: n life skills Bernadine will learn, explore, practice and apply 2-3 skills/strategies that promote mindfulness and help her establish routine and life balance specifically in area of finding employment. Report on progress weekly.   4) Wellness/D.c planning: Will improve wellness related behaviors by setting wellness goals weekly. Reporting on progress weekly     Area of Treatment Focus:  Symptom Management, Personal Safety and Community Resources/Discharge Planning    Therapeutic Interventions/Treatment Strategies:  Support, Feedback, Safety Assessments, Clarification, Education and Cognitive Behavioral Therapy    Response to Treatment Strategies:  Accepted Feedback, Gave Feedback, Listened, Attentive and Alert, Needed some redirection    Name of Group:  Group Psychotherapy at 9:00    Description and Outcome:   Bernadine reported that she was angry at herself today.  Yesterday she had many foods that she does not want to be eating right now.  This included alcohol too.  She went through some reasons why she binged yesterday and then asked for feedback.  After receiving feedback she became angry at this writer and felt like no one was hearing " her.  This writer validate her concerns but then also reminded her that it is not possible for others to solve her issues.  She apologized and repeated that she was frustrated.  Her #2 goal is to discuss her stressors and then work on problem solving.Today it seemed like any coping skill presented to her was not comfortable to her. She continues to deny suicidal ideation but is unhappy with her life. Bernadine would benefit from additional opportunities to practice and implement content from this session. Help rejecting and self compassion are topics of discussion that will be helpful for the Bernadine when she is ready to look at them.      Is this a Weekly Review of the Progress on the Treatment Plan?  No.      Are Treatment Plan Goals being addressed?  Yes, continue treatment goals      Are Treatment Plan Strategies to Address Goals Effective?  Yes, continue treatment strategies      Are there any current contracts in place?  No

## 2017-10-06 NOTE — ADDENDUM NOTE
Encounter addended by: Dorothy Last RN on: 10/6/2017  4:33 PM<BR>     Actions taken: Flowsheet accepted

## 2017-10-06 NOTE — PROGRESS NOTES
"[facility], [place]   Psychiatric Progress Note        Interim History:   From H&P: \"Bernadine Samaniego is a 41 year old female referred to the [facility] by her psychiatrist after two hospitalizations in the last 6 months. She was living in [facility] residential treatment from April to July of 2017 and is looking for more structured environment in order to continue to improve. Ms. Samaniego has a history of depression dating back to her teen years. She has been hospitalized total of 3 times, n 2013, 2016 and March of 2017, all at [facility]. She had one suicide attempt in Nov of 2016 by OD on Ambien and alcohol. She had a plan to buy a gun and shut herself but never acted on it.     Ms. Samaniego states the current episode of depression started about 4 years ago. She feels her symptoms are getting worst in the winter and better in the Summer. Feels that the depression is somewhat better in the last month.  Symptoms include chronic fatigue, low energy and motivation, sleep \"can be better\", isolation, and avoidance. States her sleep varies from 2 to 10 hours but in the last two weeks have been more consistent and averaging 6-8 hours. Denies SI, HI, SIB. Denies manic and psychotic symptoms. States memory and concentration are good, appetite is improving, as well. Fatigue is getting better. States she is seeing a chronic fatigue specialist who is doing an elimination diet, and since she started her energy is much better.    Ms. Samaniego has a therapist she sees once a week, a psychiatrist, Dr. Medina, [employer], she sees once a month, next appt is in the beginning of September.  She has an [employer] worker whom she sees twice a week. Her plan is to find a part time job and move out of her parents home, but feels she is not ready yet. Ms. Samaniego spent her days exercising and doing house chores\".     The client's care was discussed with the treatment team.  Staff report client has been calm, pleasant, cooperative. " She appear to be straggling with her mood in the last week. She is attending groups and participating appropriately.     Met with client for follow up. States she is feeling more depressed and anxious. Does not have any particular stressors but finds it hard to follow her diet and go to the gym. She has been having bouts of nausea and fatigue which makes it hard to get motivated. She started drinking again 2 times a week about 2-3 drinks each time. Understand that alcohol will increase depression and will interfere with medications. She has a history of significant alcohol abuse and knows she needs to stay away from it. Denies SI, SIB, HI. Rates depression as moderate, anxiety as high. Sleep have been a problems as well. Was prescribed Trazodone 100 mg qhs but took it only once; it is not clear why she stopped taking it. She is frustrated by the lack of progress despite her efforts. She is fearful of decompensating and that makes her anxiety and insomnia worst.     Naomi saw her psychiatrist, Dr. Cunningham, 2 weeks ago, and has a follow up appointment 10/19/2017. She is still looking for a job but started procrastinating again and not doing as much as she thinks she could. She is still living with her mother and step father.          Medications:     Current Outpatient Prescriptions   Medication     TRAZODONE HCL PO     BuPROPion HCl (WELLBUTRIN PO)     lamoTRIgine (LAMICTAL) 150 MG tablet     levothyroxine (SYNTHROID/LEVOTHROID) 50 MCG tablet     Vilazodone HCl (VIIBRYD PO)     norethindrone-ethinyl estradiol (MICROGESTIN 1.5/30) 1.5-30 MG-MCG per tablet     etonogestrel-ethinyl estradiol (NUVARING) 0.12-0.015 MG/24HR vaginal ring     No current facility-administered medications for this encounter.             Allergies:     Allergies   Allergen Reactions     Suprax [Cefixime]           Labs:   No results found for this or any previous visit (from the past 672 hour(s)).         Psychiatric Examination:                       Weight is 0 lbs 0 oz  There is no height or weight on file to calculate BMI.    Appearance: awake, alert, well groomed, alert, cooperative, mild distress and normal weight  Attitude:  cooperative  Eye Contact:  good  Mood:  anxious and sad   Affect:  mood congruent  Speech:  clear, coherent  Psychomotor Behavior:  fidgeting  Throught Process:  logical and goal oriented  Associations:  no loose associations  Thought Content:  no evidence of suicidal ideation or homicidal ideation, no auditory hallucinations present and no visual hallucinations present  Insight:  good  Judgement:  intact  Oriented to:  time, person, and place  Attention Span and Concentration:  intact  Recent and Remote Memory:  intact         Precautions:    None!       DIagnoses:   1. Major Depressive Disorder, recurrent, moderate  2. R/O Bipolar I Disorder  3. Alcohol Use Disorder, recurrent, moderate         Plan:     1. Continue of the Outpatient Day Treatment Program. Patient finds the education and support of the the program helpful in keeping current symptoms under control.  2. Continue current medications. I recommend starting Gabapentin tid, prn for anxiety and insomnia. 3. The patient was encourage to follow up with PCP and Psychiatrist.   4. The patient was advised to let us know if inpatient admission or further help is needed.  5. Care was coordinated with the treatment team.     Sindi REIS CNP  Date: 10/06/17  Time: 12:36 PM

## 2017-10-08 NOTE — ADDENDUM NOTE
Encounter addended by: Dorothy Last RN on: 10/8/2017  3:17 PM<BR>     Actions taken: Flowsheet accepted

## 2017-10-08 NOTE — ADDENDUM NOTE
Encounter addended by: Dorothy Last RN on: 10/8/2017  4:29 PM<BR>     Actions taken: Flowsheet accepted

## 2017-10-08 NOTE — ADDENDUM NOTE
Encounter addended by: Dorothy Last RN on: 10/8/2017  3:21 PM<BR>     Actions taken: Flowsheet accepted

## 2017-10-08 NOTE — ADDENDUM NOTE
Encounter addended by: Dorothy Last RN on: 10/8/2017  3:56 PM<BR>     Actions taken: Flowsheet accepted

## 2017-10-09 NOTE — ADDENDUM NOTE
Encounter addended by: Dorothy Last RN on: 10/9/2017  9:40 AM<BR>     Actions taken: Flowsheet accepted

## 2017-10-10 ENCOUNTER — HOSPITAL ENCOUNTER (OUTPATIENT)
Dept: BEHAVIORAL HEALTH | Facility: CLINIC | Age: 42
End: 2017-10-10
Attending: NURSE PRACTITIONER
Payer: MEDICARE

## 2017-10-10 PROCEDURE — H2012 BEHAV HLTH DAY TREAT, PER HR: HCPCS

## 2017-10-10 NOTE — PROGRESS NOTES
"Adult Mental Health Outpatient Group Therapy Progress Note     Date: 10/10/17  Time: 11:00-11:50    Client Initial Individualized Goals for Treatment:\"Positive structure.  Concerned about backsliding.  I want to maintain where I am now because I am doing much better.  Continue to move forward.  Work towards finding a part time job- figure out what makes sense as first steps and what type of job to pursue first.\".      Treatment Goals from Individualized Treatment Plan:   1) Safety: Client will notify staff when needing assistance to develop or implement a coping plan to manage suicidal or self injurious urges.  Client will use coping plan for safety, as needed.  2) Symptom Management: Bernadine will process life stressors and identify ways to work through and problem solve current issues as they come up.  3) Life Skills: n life skills Bernadine will learn, explore, practice and apply 2-3 skills/strategies that promote mindfulness and help her establish routine and life balance specifically in area of finding employment. Report on progress weekly.   4) Wellness/D.c planning: Will improve wellness related behaviors by setting wellness goals weekly. Reporting on progress weekly      Area of Treatment Focus:  Symptom Management, Personal Safety, Community Resources/Discharge Planning, Develop / Improve Independent Living Skills and Develop Socialization / Interpersonal Relationship Skills    Therapeutic Interventions/Treatment Strategies:  Support, Redirection, Feedback, Limit/Boundaries, Safety Assessments, Structured Activity, Problem Solving, Clarification and Education    Response to Treatment Strategies:  Gave Feedback, Listened, Attentive and Alert    Name of Group:  Self support skills     Description and Outcome: Bernadine attended and participated in a structured self-support skills group where intervention focuses on learning, developing, and practicing coping and life skills and strategies through structured experiential " "psycho-education to improve function in valued roles, routines, relationships, and independent living skills.       Today the purpose of the group was on experiencing the benefits of engaging in a cognitive focused activity requiring attention and recall from each individuals fund of general knowledge. The emphasis of the guided group activity is to gain a clearer understanding of the positive impact that a \"healthy distraction\" can have on impacting nervous system arousal level and help with self-regulation. Bernadine engages fully, demonstrates good skill. Her affect brightens periodically during the structured activity. She verbalizes \"using my mind\".  Bernadine would benefit from additional opportunities to practice and identifying ways to implement content from this session into her life.    Treatment Planning Meetings:  10/03/17: Team met with Bernadine, discussed progress. See Treatment Plan for details.   9/01/17: Team members met with Bernadine, discussed program, process, progress and set treatment goals with her. See Individualized treatment goals for details.   : Absent.  8/07/17: first day in program.    Is this a Weekly Review of the Progress on the Treatment Plan?  No.              "

## 2017-10-17 ENCOUNTER — HOSPITAL ENCOUNTER (OUTPATIENT)
Dept: BEHAVIORAL HEALTH | Facility: CLINIC | Age: 42
End: 2017-10-17
Attending: NURSE PRACTITIONER
Payer: MEDICARE

## 2017-10-17 PROCEDURE — H2012 BEHAV HLTH DAY TREAT, PER HR: HCPCS

## 2017-10-17 NOTE — PROGRESS NOTES
"Adult Mental Health Outpatient Group Therapy Progress Note     Client Initial Individualized Goals for Treatment:\"Positive structure.  Concerned about backsliding.  I want to maintain where I am now because I am doing much better.  Continue to move forward.  Work towards finding a part time job- figure out what makes sense as first steps and what type of job to pursue first.\".       Treatment Goals from Individualized Treatment Plan:   1) Safety: Client will notify staff when needing assistance to develop or implement a coping plan to manage suicidal or self injurious urges.  Client will use coping plan for safety, as needed.  2) Symptom Management: Bernadine will process life stressors and identify ways to work through and problem solve current issues as they come up.  3) Life Skills: n life skills Bernadine will learn, explore, practice and apply 2-3 skills/strategies that promote mindfulness and help her establish routine and life balance specifically in area of finding employment. Report on progress weekly.   4) Wellness/D.c planning: Will improve wellness related behaviors by setting wellness goals weekly. Reporting on progress weekly     Area of Treatment Focus:  Symptom Management    Therapeutic Interventions/Treatment Strategies:  Support, Feedback, Structured Activity, Clarification and Education    Response to Treatment Strategies:  Accepted Feedback, Gave Feedback, Listened, Focused on Goals, Attentive and Alert    Name of Group:  Mental health management     Description and Outcome:  The group was given a structured journaling worksheet with the focus on feeling identification, expression and containment.  Information on the purpose and benefits of structured journaling was discussed.  Group members were offered the opportunity to share part, all, or none of their journaling and were encouraged to comment on process.  Client demonstrated understanding of session content by participating in activity.  Bernadine chose " to work with the feeling of resentment.  Client verbalized understanding of session content by identifying self talk she could challenge to change relationship to resentment and interactions with parents.  Client would benefit from additional opportunities to practice and implement content from this session.      Is this a Weekly Review of the Progress on the Treatment Plan?  No

## 2017-10-18 ENCOUNTER — HOSPITAL ENCOUNTER (OUTPATIENT)
Dept: BEHAVIORAL HEALTH | Facility: CLINIC | Age: 42
End: 2017-10-18
Attending: NURSE PRACTITIONER
Payer: MEDICARE

## 2017-10-18 PROCEDURE — H2012 BEHAV HLTH DAY TREAT, PER HR: HCPCS

## 2017-10-18 NOTE — PROGRESS NOTES
"Adult Mental Health Outpatient Group Therapy Progress Note        Client Initial Individualized Goals for Treatment:\"Positive structure.  Concerned about backsliding.  I want to maintain where I am now because I am doing much better.  Continue to move forward.  Work towards finding a part time job- figure out what makes sense as first steps and what type of job to pursue first.\".      Treatment Goals from Individualized Treatment Plan:   1) Safety: Client will notify staff when needing assistance to develop or implement a coping plan to manage suicidal or self injurious urges.  Client will use coping plan for safety, as needed.  2) Symptom Management: Bernadine will process life stressors and identify ways to work through and problem solve current issues as they come up.  3) Life Skills: n life skills Bernadine will learn, explore, practice and apply 2-3 skills/strategies that promote mindfulness and help her establish routine and life balance specifically in area of finding employment. Report on progress weekly.   4) Wellness/D.c planning: Will improve wellness related behaviors by setting wellness goals weekly. Reporting on progress weekly     Area of Treatment Focus:  Symptom Management, Personal Safety and Community Resources/Discharge Planning    Therapeutic Interventions/Treatment Strategies:  Support, Feedback, Safety Assessments, Clarification, Education and Cognitive Behavioral Therapy    Response to Treatment Strategies:  Accepted Feedback, Gave Feedback, Listened, Attentive and Alert, Needed some redirection    Name of Group:  Group Psychotherapy at 9:00 and MHM at 11:00 am.    Description and Outcome:   Bernadine reported that she was still feeling some improvement. She was asking for some advice from the others about what will happen to her funding once she starts her job. She shared that when she was in a DBT group that it was hard for her because she was really not stable.  She still resents the staff who worked " with her that they did not recognize that she was not doing all that well.  Her main stressor today is how to cover her finances.  In this group she is focusing on her second goal of bringing up her stressors and then trying to problem solve and learn coping skills.  She will always benefit from more time to practice her skills.  She can learn skills very quickly but it is in the application of them where she trips up.    MHM  The client attended Mental Health Management group and the topic was talking about what might be a trigger during the day.  She quickly said that it will be about taking her car in for repairs and not having the money to cover the bill.  She was asked to come up with a self care idea and she is going for a walk with a friend.    Is this a Weekly Review of the Progress on the Treatment Plan?  No.      Are Treatment Plan Goals being addressed?  Yes, continue treatment goals      Are Treatment Plan Strategies to Address Goals Effective?  Yes, continue treatment strategies      Are there any current contracts in place?  No

## 2017-10-18 NOTE — PROGRESS NOTES
"Adult Mental Health Outpatient Group Therapy Progress Note        Client Initial Individualized Goals for Treatment:\"Positive structure.  Concerned about backsliding.  I want to maintain where I am now because I am doing much better.  Continue to move forward.  Work towards finding a part time job- figure out what makes sense as first steps and what type of job to pursue first.\".      Treatment Goals from Individualized Treatment Plan:   1) Safety: Client will notify staff when needing assistance to develop or implement a coping plan to manage suicidal or self injurious urges.  Client will use coping plan for safety, as needed.  2) Symptom Management: Bernadine will process life stressors and identify ways to work through and problem solve current issues as they come up.  3) Life Skills: n life skills Bernadine will learn, explore, practice and apply 2-3 skills/strategies that promote mindfulness and help her establish routine and life balance specifically in area of finding employment. Report on progress weekly.   4) Wellness/D.c planning: Will improve wellness related behaviors by setting wellness goals weekly. Reporting on progress weekly     Area of Treatment Focus:  Symptom Management, Personal Safety and Community Resources/Discharge Planning    Therapeutic Interventions/Treatment Strategies:  Support, Feedback, Safety Assessments, Clarification, Education and Cognitive Behavioral Therapy    Response to Treatment Strategies:  Accepted Feedback, Gave Feedback, Listened, Attentive and Alert, Needed some redirection    Name of Group:  Group Psychotherapy at 10:00    Description and Outcome:   Bernadine reported that she was feeling some improvement.  Even though her mother helped her find this job she told the group that she is probably getting a new part-time job.  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  She is still having mixed feelings about it because of her mom's involvement but she is trying to let go of " that fact.  She is even a little upset towards her mother but again is willing to talk about why she is struggling.  She has started to sleep a bit more and her stomach has settled a bit too.  In this group she is focusing on her second goal of bringing up her stressors and then trying to problem solve and learn coping skills.  She will always benefit from more time to practice her skills.  She can learn skills very quickly but it is in the application of them where she trips up.    Is this a Weekly Review of the Progress on the Treatment Plan?  Yes.      Are Treatment Plan Goals being addressed?  Yes, continue treatment goals      Are Treatment Plan Strategies to Address Goals Effective?  Yes, continue treatment strategies      Are there any current contracts in place?  No

## 2017-10-18 NOTE — PROGRESS NOTES
"Adult Mental Health Outpatient Group Therapy Progress Note     Date: 10/17/17  Time: 11:00-11:50    Client Initial Individualized Goals for Treatment:\"Positive structure.  Concerned about backsliding.  I want to maintain where I am now because I am doing much better.  Continue to move forward.  Work towards finding a part time job- figure out what makes sense as first steps and what type of job to pursue first.\".      Treatment Goals from Individualized Treatment Plan:   1) Safety: Client will notify staff when needing assistance to develop or implement a coping plan to manage suicidal or self injurious urges.  Client will use coping plan for safety, as needed.  2) Symptom Management: Bernadine will process life stressors and identify ways to work through and problem solve current issues as they come up.  3) Life Skills: In life skills Bernadine will learn, explore, practice and apply 2-3 skills/strategies that promote mindfulness and help her establish routine and life balance specifically in area of finding employment. Report on progress weekly.   4) Wellness/D.c planning: Will improve wellness related behaviors by setting wellness goals weekly. Reporting on progress weekly      Area of Treatment Focus:  Symptom Management, Personal Safety, Community Resources/Discharge Planning, Develop / Improve Independent Living Skills and Develop Socialization / Interpersonal Relationship Skills    Therapeutic Interventions/Treatment Strategies:  Support, Redirection, Feedback, Limit/Boundaries, Safety Assessments, Structured Activity, Problem Solving, Clarification and Education    Response to Treatment Strategies:  Gave Feedback, Listened, Attentive and Alert    Name of Group:  Self support skills     Description and Outcome: Bernadine attended and participated in a structured self-support skills group where intervention focuses on learning, developing, and practicing coping and life skills and strategies through structured " experiential psycho-education to improve function in valued roles, routines, relationships, and independent living skills. Group members were led thought a facilitated goal setting process. Time spent on reflecting progress towards their weekly wellness goals last week and identifying areas they want to work on this week moving forward. Bernadine is engaged, shares easily and expresses her intent to continue to go to the gym. She shares she does not what turned for her but that her motivation is improved to take care of herself. She has been researching and learning about self-compassion. Validation and support provided.  Affect even, brightening at times. Client demonstrated understanding of session content by focusing and discerning on what she wants to accomplish this week.      Treatment Planning Meetings:  10/03/17: Team met with Bernadine, discussed progress. See Treatment Plan for details.   9/01/17: Team members met with Bernadine, discussed program, process, progress and set treatment goals with her. See Individualized treatment goals for details.   : Absent.  8/07/17: first day in program.    Is this a Weekly Review of the Progress on the Treatment Plan?  Yes.      Are Treatment Plan Goals being addressed?  Yes, continue treatment goals      Are Treatment Plan Strategies to Address Goals Effective?  Yes, continue treatment strategies      Are there any current contracts in place?  No

## 2017-10-19 NOTE — PROGRESS NOTES
"Adult Mental Health Outpatient Group Therapy Progress Note     Date: 10/18/17  Time: 10:00-10:50    Client Initial Individualized Goals for Treatment:\"Positive structure.  Concerned about backsliding.  I want to maintain where I am now because I am doing much better.  Continue to move forward.  Work towards finding a part time job- figure out what makes sense as first steps and what type of job to pursue first.\".      Treatment Goals from Individualized Treatment Plan:   1) Safety: Client will notify staff when needing assistance to develop or implement a coping plan to manage suicidal or self injurious urges.  Client will use coping plan for safety, as needed.  2) Symptom Management: Bernadine will process life stressors and identify ways to work through and problem solve current issues as they come up.  3) Life Skills: In life skills Bernadine will learn, explore, practice and apply 2-3 skills/strategies that promote mindfulness and help her establish routine and life balance specifically in area of finding employment. Report on progress weekly.   4) Wellness/D.c planning: Will improve wellness related behaviors by setting wellness goals weekly. Reporting on progress weekly      Area of Treatment Focus:  Symptom Management, Personal Safety, Community Resources/Discharge Planning, Develop / Improve Independent Living Skills and Develop Socialization / Interpersonal Relationship Skills    Therapeutic Interventions/Treatment Strategies:  Support, Redirection, Feedback, Limit/Boundaries, Safety Assessments, Structured Activity, Problem Solving, Clarification and Education    Response to Treatment Strategies:  Gave Feedback, Listened, Attentive and Alert    Name of Group:  Self support skills     Description and Outcome: Bernadine attended and participated in a structured self-support skills group where intervention focuses on learning, developing, and practicing coping and life skills and strategies through structured " experiential psycho-education to improve function in valued roles, routines, relationships, and independent living skills. Today topic is on self care. Psycho-education on components of self care provided then group members given opportunity to explore and work on structured activity that supported an area of self care that they are working on. Bernadine chose to continue her work on self-compassion and engaged in a structured activity with good focus and understanding.  Validation and support provided.  Affect even, brightening at times. Bernadine  demonstrated understanding of session content by expressing her expanding understanding of what self-compassion is and what it is not and taking small steps to incorporating it into her life.      Treatment Planning Meetings:  10/03/17: Team met with Bernadine, discussed progress. See Treatment Plan for details.   9/01/17: Team members met with Bernadine, discussed program, process, progress and set treatment goals with her. See Individualized treatment goals for details.   : Absent.  8/07/17: first day in program.    Is this a Weekly Review of the Progress on the Treatment Plan?  No.

## 2017-10-20 ENCOUNTER — HOSPITAL ENCOUNTER (OUTPATIENT)
Dept: BEHAVIORAL HEALTH | Facility: CLINIC | Age: 42
End: 2017-10-20
Attending: NURSE PRACTITIONER
Payer: MEDICARE

## 2017-10-20 PROCEDURE — H2012 BEHAV HLTH DAY TREAT, PER HR: HCPCS

## 2017-10-20 NOTE — PROGRESS NOTES
"Adult Mental Health Outpatient Group Therapy Progress Note     Client Initial Individualized Goals for Treatment: :\"Positive structure.  Concerned about backsliding.  I want to maintain where I am now because I am doing much better.  Continue to move forward.  Work towards finding a part time job- figure out what makes sense as first steps and what type of job to pursue first.\".     See Initial Treatment suggestions for the client during the time between Diagnostic Assessment and completion of the Master Individualized Treatment Plan.    Treatment Goals:  1) Safety: Client will notify staff when needing assistance to develop or implement a coping plan to manage suicidal or self injurious urges.  Client will use coping plan for safety, as needed.  2) Symptom Management: Bernadine will process life stressors and identify ways to work through and problem solve current issues as they come up.  3) Life Skills: n life skills Bernadine will learn, explore, practice and apply 2-3 skills/strategies that promote mindfulness and help her establish routine and life balance specifically in area of finding employment. Report on progress weekly.   4) Wellness/D.c planning: Will improve wellness related behaviors by setting wellness goals weekly. Reporting on progress weekly     Area of Treatment Focus:  Symptom Management and Develop Socialization / Interpersonal Relationship Skills    Therapeutic Interventions/Treatment Strategies:  Support, Redirection, Feedback and Problem Solving    Response to Treatment Strategies:  Accepted Feedback, Gave Feedback and Accepted Support    Name of Group:  Psychotherapy     Description and Outcome:  Bernadine reported worries about paying for her car repairs and not wanting to ask her parents for fear they will respond negatively. She reported she was wrong in this belief and her parents were willing to help.  Highlighted how she was able to identify how jumping to conclusions had not been useful to her.  " Bernadine stated she had come to the realization in family therapy that she had a lot of resentment built up, especially towards her mom, and that this was not healthy.  Validated difficulty of coping effectively with resentment especially while still living with mom.  Encouraged use of skills to be responsive rather than reactive when feeling irritable with mom.  She appeared receptive and did not report safety concerns.      Client verbalized understanding of session content by stating plans to practice pausing before responding to mom to avoid reacting in anger.    Is this a Weekly Review of the Progress on the Treatment Plan?  No

## 2017-10-20 NOTE — PROGRESS NOTES
"  Adult Mental Health Outpatient Group Therapy Progress Note       See Initial Treatment suggestions for the client during the time between Diagnostic Assessment and completion of the Master Individualized Treatment Plan.    Treatment Goals:     Treatment Goals from Individualized Treatment Plan:   1) Safety: Client will notify staff when needing assistance to develop or implement a coping plan to manage suicidal or self injurious urges.  Client will use coping plan for safety, as needed.  2) Symptom Management: Bernadine will process life stressors and identify ways to work through and problem solve current issues as they come up.  3) Life Skills: n life skills Bernadine will learn, explore, practice and apply 2-3 skills/strategies that promote mindfulness and help her establish routine and life balance specifically in area of finding employment. Report on progress weekly.   4) Wellness/D.c planning: Will improve wellness related behaviors by setting wellness goals weekly. Reporting on progress weekly          Area of Treatment Focus:  Symptom Management    Therapeutic Interventions/Treatment Strategies:  Support, Feedback, Structured Activity, Problem Solving, Clarification and Education    Response to Treatment Strategies:  Accepted Feedback, Gave Feedback, Listened, Focused on Goals, Attentive, Accepted Support and Alert    Name of Group:  Life Skills     Description and Outcome:  Client completed \"Weekly Reflection\" and \"Weekend Wellness\" hand-outs. Bernadine was able to identify a variety of positives from this past week focusing on being less self-critical and more positive with her review of the week. She was able to set goals for the weekend with an emphasis on self-compassion.    Is this a Weekly Review of the Progress on the Treatment Plan?  Yes.      Are Treatment Plan Goals being addressed?  Yes, continue treatment goals      Are Treatment Plan Strategies to Address Goals Effective?  Yes, continue treatment " strategies      Are there any current contracts in place?  No

## 2017-10-20 NOTE — ADDENDUM NOTE
Encounter addended by: Dorothy Last RN on: 10/20/2017  3:42 PM<BR>     Actions taken: Flowsheet accepted

## 2017-10-24 ENCOUNTER — HOSPITAL ENCOUNTER (OUTPATIENT)
Dept: BEHAVIORAL HEALTH | Facility: CLINIC | Age: 42
End: 2017-10-24
Attending: NURSE PRACTITIONER
Payer: MEDICARE

## 2017-10-24 PROCEDURE — H2012 BEHAV HLTH DAY TREAT, PER HR: HCPCS

## 2017-10-25 ENCOUNTER — HOSPITAL ENCOUNTER (OUTPATIENT)
Dept: BEHAVIORAL HEALTH | Facility: CLINIC | Age: 42
End: 2017-10-25
Attending: NURSE PRACTITIONER
Payer: MEDICARE

## 2017-10-25 PROCEDURE — H2012 BEHAV HLTH DAY TREAT, PER HR: HCPCS

## 2017-10-25 NOTE — PROGRESS NOTES
"Adult Mental Health Outpatient Group Therapy Progress Note        Client Initial Individualized Goals for Treatment:\"Positive structure.  Concerned about backsliding.  I want to maintain where I am now because I am doing much better.  Continue to move forward.  Work towards finding a part time job- figure out what makes sense as first steps and what type of job to pursue first.\".      Treatment Goals from Individualized Treatment Plan:   1) Safety: Client will notify staff when needing assistance to develop or implement a coping plan to manage suicidal or self injurious urges.  Client will use coping plan for safety, as needed.  2) Symptom Management: Bernadine will process life stressors and identify ways to work through and problem solve current issues as they come up.  3) Life Skills: n life skills Bernadine will learn, explore, practice and apply 2-3 skills/strategies that promote mindfulness and help her establish routine and life balance specifically in area of finding employment. Report on progress weekly.   4) Wellness/D.c planning: Will improve wellness related behaviors by setting wellness goals weekly. Reporting on progress weekly     Area of Treatment Focus:  Symptom Management, Personal Safety and Community Resources/Discharge Planning    Therapeutic Interventions/Treatment Strategies:  Support, Feedback, Safety Assessments, Clarification, Education and Cognitive Behavioral Therapy    Response to Treatment Strategies:  Accepted Feedback, Gave Feedback, Listened, Attentive and Alert, Needed some redirection    Name of Group:  Group Psychotherapy at 9:00 and MHM at 11:00 am.    Description and Outcome:   Bernadine reported that she wanted to talk about a frustrating situation that she had with her mother last night.  This conversation was related to her #2 goal of learning about symptom management.  She states that there are many stressors in her life because of her mental health.  A stressor that comes up often is " "her relationship with her mother.  Last night, her birthday, her mother got on her about drinking wine at dinner.  Today Bernadine and her mother are going to family counseling and the mother wants to bring this issue back up.  Bernadine is angry at her mother for bringing this up and is anxious about the appointment today.  This therapist directed Bernadine to talk about her feelings that came up for her and then how she could effectively communicate to her mother about those feelings. This is an ongoing topic for Bernadine and she will benefit from more practice of the topics discussed in this group today.  She denies having suicidal ideation.     [facility]  The client attended Mental Health Management group and the topic was talking about the concept of \"Urge Surfing\" or \"Surfing through an Urge\".  This is part of the DBT skill set.  The client was able to apply to recent situations where she had self destructive urges.  Her participation was good.      Is this a Weekly Review of the Progress on the Treatment Plan?  No.      Are Treatment Plan Goals being addressed?  Yes, continue treatment goals      Are Treatment Plan Strategies to Address Goals Effective?  Yes, continue treatment strategies      Are there any current contracts in place?  No        "

## 2017-10-25 NOTE — PROGRESS NOTES
"Adult Mental Health Outpatient Group Therapy Progress Note     Date: 10/24/17  Time: 11:00-11:50    Client Initial Individualized Goals for Treatment:\"Positive structure.  Concerned about backsliding.  I want to maintain where I am now because I am doing much better.  Continue to move forward.  Work towards finding a part time job- figure out what makes sense as first steps and what type of job to pursue first.\".      Treatment Goals from Individualized Treatment Plan:   1) Safety: Client will notify staff when needing assistance to develop or implement a coping plan to manage suicidal or self injurious urges.  Client will use coping plan for safety, as needed.  2) Symptom Management: Bernadine will process life stressors and identify ways to work through and problem solve current issues as they come up.  3) Life Skills: In life skills Bernadine will learn, explore, practice and apply 2-3 skills/strategies that promote mindfulness and help her establish routine and life balance specifically in area of finding employment. Report on progress weekly.   4) Wellness/D.c planning: Will improve wellness related behaviors by setting wellness goals weekly. Reporting on progress weekly      Area of Treatment Focus:  Symptom Management, Personal Safety, Community Resources/Discharge Planning, Develop / Improve Independent Living Skills and Develop Socialization / Interpersonal Relationship Skills    Therapeutic Interventions/Treatment Strategies:  Support, Redirection, Feedback, Limit/Boundaries, Safety Assessments, Structured Activity, Problem Solving, Clarification and Education    Response to Treatment Strategies:  Gave Feedback, Listened, Attentive and Alert    Name of Group:  Self support skills     Description and Outcome: Bernadine attended and participated in a structured self-support skills group where intervention focuses on learning, developing, and practicing coping and life skills and strategies through structured " experiential psycho-education to improve function in valued roles, routines, relationships, and independent living skills. Today topic is on life balance with an emphasis on leisure activities. Psychoeducation provided on the positive impact leisure has on the nervous system and for building self-efficacy. Concepts of eustress vs distress were included. Group members then identified their 5 leisure values (what they want to get from or experience during leisure activities). They also reflected on leisure activities they did when they were younger, what they are doing now, and contemplate any new activities they are interested in. They aligned these to their identified values. Bernadine demonstrated understanding of session content by fully engaging in content and process, she is able to identify the barriers to doing things and subsequently she is able to problem solve ways to get more social/cognitive activities back into her life. Validation and support provided.       Treatment Planning Meetings:  10/03/17: Team met with Bernadine, discussed progress. See Treatment Plan for details.   9/01/17: Team members met with Bernadine, discussed program, process, progress and set treatment goals with her. See Individualized treatment goals for details.   : Absent.  8/07/17: first day in program.    Is this a Weekly Review of the Progress on the Treatment Plan?  No.

## 2017-10-27 NOTE — PROGRESS NOTES
"Adult Mental Health Outpatient Group Therapy Progress Note     Date: 10/25/17  Time: 10:00-10:50    Client Initial Individualized Goals for Treatment:\"Positive structure.  Concerned about backsliding.  I want to maintain where I am now because I am doing much better.  Continue to move forward.  Work towards finding a part time job- figure out what makes sense as first steps and what type of job to pursue first.\".      Treatment Goals from Individualized Treatment Plan:   1) Safety: Client will notify staff when needing assistance to develop or implement a coping plan to manage suicidal or self injurious urges.  Client will use coping plan for safety, as needed.  2) Symptom Management: Bernadine will process life stressors and identify ways to work through and problem solve current issues as they come up.  3) Life Skills: In life skills Bernadine will learn, explore, practice and apply 2-3 skills/strategies that promote mindfulness and help her establish routine and life balance specifically in area of finding employment. Report on progress weekly.   4) Wellness/D.c planning: Will improve wellness related behaviors by setting wellness goals weekly. Reporting on progress weekly      Area of Treatment Focus:  Symptom Management, Personal Safety, Community Resources/Discharge Planning, Develop / Improve Independent Living Skills and Develop Socialization / Interpersonal Relationship Skills    Therapeutic Interventions/Treatment Strategies:  Support, Redirection, Feedback, Limit/Boundaries, Safety Assessments, Structured Activity, Problem Solving, Clarification and Education    Response to Treatment Strategies:  Gave Feedback, Listened, Attentive and Alert    Name of Group:  Self support skills     Description and Outcome: Bernadine attended and participated in a structured self-support skills group where intervention focuses on learning, developing, and practicing coping and life skills and strategies through structured " experiential psycho-education to improve function in valued roles, routines, relationships, and independent living skills. Today topic focus is experiential engagement in leisure activity for calming.  Group members were given opportunity to explore and choose from a variety of singular or group leisure activities and monitor their 1) mood 2) energy level, 3) anxiety level. Bernadine states her mood is good/has been better for a while in general, more energy today (less pain), she does not feel anxious. She engages int he experiential structured task with peers with good focus and concentration. She is appropriately supportive of peers who struggle, demonstrating empathy. She states she would really like to find a group socially to engage in similar activity as she finds it cognitively stimulating which is one of her leisure values. Bernadine demonstrates understanding of session content by working towards  goal number 3 today by starting to process ways leisure can add structure and routine to her life.     Treatment Planning Meetings:  11/03/17  10/03/17: Team met with Bernadine, discussed progress. See Treatment Plan for details.   9/01/17: Team members met with Bernadine, discussed program, process, progress and set treatment goals with her. See Individualized treatment goals for details.   : Absent.  8/07/17: first day in program.    Is this a Weekly Review of the Progress on the Treatment Plan?  Yes.      Are Treatment Plan Goals being addressed?  Yes, continue treatment goals      Are Treatment Plan Strategies to Address Goals Effective?  Yes, continue treatment strategies      Are there any current contracts in place?  No

## 2017-10-31 ENCOUNTER — HOSPITAL ENCOUNTER (OUTPATIENT)
Dept: BEHAVIORAL HEALTH | Facility: CLINIC | Age: 42
End: 2017-10-31
Attending: NURSE PRACTITIONER
Payer: MEDICARE

## 2017-10-31 PROCEDURE — H2012 BEHAV HLTH DAY TREAT, PER HR: HCPCS

## 2017-11-01 NOTE — PROGRESS NOTES
"Adult Mental Health Outpatient Group Therapy Progress Note     Date: 10/31/17  Time: 11:00-11:50    Client Initial Individualized Goals for Treatment:\"Positive structure.  Concerned about backsliding.  I want to maintain where I am now because I am doing much better.  Continue to move forward.  Work towards finding a part time job- figure out what makes sense as first steps and what type of job to pursue first.\".      Treatment Goals from Individualized Treatment Plan:   1) Safety: Client will notify staff when needing assistance to develop or implement a coping plan to manage suicidal or self injurious urges.  Client will use coping plan for safety, as needed.  2) Symptom Management: Bernadine will process life stressors and identify ways to work through and problem solve current issues as they come up.  3) Life Skills: In life skills Bernadine will learn, explore, practice and apply 2-3 skills/strategies that promote mindfulness and help her establish routine and life balance specifically in area of finding employment. Report on progress weekly.   4) Wellness/D.c planning: Will improve wellness related behaviors by setting wellness goals weekly. Reporting on progress weekly      Area of Treatment Focus:  Symptom Management, Personal Safety, Community Resources/Discharge Planning, Develop / Improve Independent Living Skills and Develop Socialization / Interpersonal Relationship Skills    Therapeutic Interventions/Treatment Strategies:  Support, Redirection, Feedback, Limit/Boundaries, Safety Assessments, Structured Activity, Problem Solving, Clarification and Education    Response to Treatment Strategies:  Gave Feedback, Listened, Attentive and Alert    Name of Group:  Self support skills     Description and Outcome: Bernadine attended and participated in a structured self-support skills group where intervention focuses on learning, developing, and practicing coping and life skills and strategies through structured " "experiential psycho-education to improve function in valued roles, routines, relationships, and independent living skills.     Today topic is on mindfulness. Psychoeducation followed by a facilitated experiential activity where group members practice the components of mindfulness (paying attention on purpose non-judgmentally). Using metaphor, the process initially starts simply and builds to the completion providing group members with a sense of mastery or delight. Initially Bernadine is skeptical but as the session progresses she expresses liking the process and found it challenging \"in a good way\". She was congratulated on her willingess to try something new. Bernadine overall is demonstrating improved mood and frustration level. She engages with peers appropriately, often providing, validation, help and support. .Bernadine demonstrates understanding of session content by working towards  goal number 3 today by practicing an easily accessible mindful activity.     Treatment Planning Meetings:  11/03/17  10/03/17: Team met with Bernadine, discussed progress. See Treatment Plan for details.   9/01/17: Team members met with Bernadine, discussed program, process, progress and set treatment goals with her. See Individualized treatment goals for details.   : Absent.  8/07/17: first day in program.    Is this a Weekly Review of the Progress on the Treatment Plan?  No.                            "

## 2017-11-03 NOTE — PROGRESS NOTES
"Adult Mental Health Outpatient Group Therapy Progress Note        Client Initial Individualized Goals for Treatment:\"Positive structure.  Concerned about backsliding.  I want to maintain where I am now because I am doing much better.  Continue to move forward.  Work towards finding a part time job- figure out what makes sense as first steps and what type of job to pursue first.\".      Treatment Goals from Individualized Treatment Plan:   1) Safety: Client will notify staff when needing assistance to develop or implement a coping plan to manage suicidal or self injurious urges.  Client will use coping plan for safety, as needed.  2) Symptom Management: Bernadine will process life stressors and identify ways to work through and problem solve current issues as they come up.  3) Life Skills: n life skills Bernadine will learn, explore, practice and apply 2-3 skills/strategies that promote mindfulness and help her establish routine and life balance specifically in area of finding employment. Report on progress weekly.   4) Wellness/D.c planning: Will improve wellness related behaviors by setting wellness goals weekly. Reporting on progress weekly     Area of Treatment Focus:  Symptom Management, Personal Safety and Community Resources/Discharge Planning    Therapeutic Interventions/Treatment Strategies:  Support, Feedback, Safety Assessments, Clarification, Education and Cognitive Behavioral Therapy    Response to Treatment Strategies:  Accepted Feedback, Gave Feedback, Listened, Attentive and Alert, Needed some redirection    Name of Group:  Group Psychotherapy at 9:00 am    Description and Outcome:   Bernadine reported that she is frustrated today.  Yesterday she had a family therapy session again where her parents lied about what they have done in the past.  When they described Bernadine's past they \"don't even make sense\" she proclaimed.  She spoke about her frustration for several minutes and then shared that she feels like she is " not moving forward because she gets stuck about how they are not holding them selves accountable.  She asked for help with this issue.  This therapist suggested that she considered letting go of their mistakes.  She could not go towards that idea at this time.  Until she practices radical acceptance or some forgiveness she could possibly stay stuck and then not make any forward progress or balance for herself which is directly related to her second treatment goal.  Bernadine can understand the skills and concepts introduced in group.  Her own emotional dysregulation gets in her way when trying to use the skills.  She benefits from being validated and encouragement to keep trying.     Is this a Weekly Review of the Progress on the Treatment Plan?  No.      Are Treatment Plan Goals being addressed?  Yes, continue treatment goals      Are Treatment Plan Strategies to Address Goals Effective?  Yes, continue treatment strategies      Are there any current contracts in place?  No

## 2017-11-03 NOTE — ADDENDUM NOTE
Encounter addended by: Dorothy Last RN on: 11/3/2017  2:22 PM<BR>     Actions taken: Flowsheet accepted

## 2017-11-07 ENCOUNTER — HOSPITAL ENCOUNTER (OUTPATIENT)
Dept: BEHAVIORAL HEALTH | Facility: CLINIC | Age: 42
End: 2017-11-07
Attending: NURSE PRACTITIONER
Payer: MEDICARE

## 2017-11-07 PROCEDURE — H2012 BEHAV HLTH DAY TREAT, PER HR: HCPCS

## 2017-11-07 NOTE — PROGRESS NOTES
"Adult Mental Health Outpatient Group Therapy Progress Note        Client Initial Individualized Goals for Treatment:\"Positive structure.  Concerned about backsliding.  I want to maintain where I am now because I am doing much better.  Continue to move forward.  Work towards finding a part time job- figure out what makes sense as first steps and what type of job to pursue first.\".      Treatment Goals from Individualized Treatment Plan:   1) Safety: Client will notify staff when needing assistance to develop or implement a coping plan to manage suicidal or self injurious urges.  Client will use coping plan for safety, as needed.  2) Symptom Management: Bernadine will process life stressors and identify ways to work through and problem solve current issues as they come up.  3) Life Skills: n life skills Bernadine will learn, explore, practice and apply 2-3 skills/strategies that promote mindfulness and help her establish routine and life balance specifically in area of finding employment. Report on progress weekly.   4) Wellness/D.c planning: Will improve wellness related behaviors by setting wellness goals weekly. Reporting on progress weekly     Area of Treatment Focus:  Symptom Management, Personal Safety and Community Resources/Discharge Planning    Therapeutic Interventions/Treatment Strategies:  Support, Feedback, Safety Assessments, Clarification, Education and Cognitive Behavioral Therapy    Response to Treatment Strategies:  Accepted Feedback, Gave Feedback, Listened, Attentive and Alert, Needed some redirection    Name of Group:  Group Psychotherapy at 10:00 am    Description and Outcome:   Bernadine reported that she is still frustrated today.  She had a family therapy session last week where her parents \"lied ' about what they have done to her in the past.  When they described Bernadine's past they \"don't even make sense\" she proclaimed.  Again she spoke about her frustration for several minutes and then shared that she " feels like she is not moving forward because she gets stuck about how they are not holding them selves accountable.  Today she did not ask for help with this issue. Today this therapist just validated her feelings. She does not seem ready to make a change regarding her parents.  Until she practices radical acceptance or some forgiveness she could possibly stay stuck and then not make any forward progress or balance for herself which is directly related to her second treatment goal.  Bernadine can understand the skills and concepts introduced in group.  Her own emotional dysregulation gets in her way when trying to use the skills.  She benefits from being validated and encouragement to keep trying her skills.     Is this a Weekly Review of the Progress on the Treatment Plan?  Yes.      Are Treatment Plan Goals being addressed?  Yes, continue treatment goals      Are Treatment Plan Strategies to Address Goals Effective?  Yes, continue treatment strategies      Are there any current contracts in place?  No

## 2017-11-08 ENCOUNTER — HOSPITAL ENCOUNTER (OUTPATIENT)
Dept: BEHAVIORAL HEALTH | Facility: CLINIC | Age: 42
End: 2017-11-08
Attending: NURSE PRACTITIONER
Payer: MEDICARE

## 2017-11-08 PROCEDURE — H2012 BEHAV HLTH DAY TREAT, PER HR: HCPCS

## 2017-11-08 NOTE — PROGRESS NOTES
"Adult Mental Health Outpatient Group Therapy Progress Note     Date: 11/07/17  Time: 11:00-11:50    Client Initial Individualized Goals for Treatment:\"Positive structure.  Concerned about backsliding.  I want to maintain where I am now because I am doing much better.  Continue to move forward.  Work towards finding a part time job- figure out what makes sense as first steps and what type of job to pursue first.\".      Treatment Goals from Individualized Treatment Plan:   1) Safety: Client will notify staff when needing assistance to develop or implement a coping plan to manage suicidal or self injurious urges.  Client will use coping plan for safety, as needed.  2) Symptom Management: Bernadine will process life stressors and identify ways to work through and problem solve current issues as they come up.  3) Life Skills: In life skills Bernadine will learn, explore, practice and apply 2-3 skills/strategies that promote mindfulness and help her establish routine and life balance specifically in area of finding employment. Report on progress weekly.   4) Wellness/D.c planning: Will improve wellness related behaviors by setting wellness goals weekly. Reporting on progress weekly      Area of Treatment Focus:  Symptom Management, Personal Safety, Community Resources/Discharge Planning, Develop / Improve Independent Living Skills and Develop Socialization / Interpersonal Relationship Skills    Therapeutic Interventions/Treatment Strategies:  Support, Redirection, Feedback, Limit/Boundaries, Safety Assessments, Structured Activity, Problem Solving, Clarification and Education    Response to Treatment Strategies:  Gave Feedback, Listened, Attentive and Alert    Name of Group:  Self support skills: Merged with 5A's    Description and Outcome: Bernadine attended and participated in a structured self-support skills group where intervention focuses on learning, developing, and practicing coping and life skills and strategies through " structured experiential psycho-education to improve function in valued roles, routines, relationships, and independent living skills.     Today the focus of the session is on mindfulness. Psychoeducation around the neuroscience and impact of mindfulness on mental health is provided including review of definitions. Group discussion around types of mindful activities was brainstormed. Distinction between quiet meditation and active meditation was provided as well as resources for both. Writer then facilitated an introductory session on the evidence based practice of Carlos Chi, an active meditation,  for the purpose of group members to experience what mindfulness feels like to them and build self-awareness around their preferences. The activity is adapted and graded per individuals needs. Bernadine is familiar with the practice from previous sessions and engages fully. Writer provided more advanced adaptations for her to progress her skills. She reports being very interested in signing up for a full class in the practice. Bernadine  engages with peers appropriately, providing support. She demonstrates understanding of session content by working towards goal number 3 today by identifying a way to add structure to her week.     Treatment Planning Meetings:  11/03/17: Team met with Bernadine, progressing, See Treatment plan for details.  10/03/17: Team met with Bernadine, discussed progress. See Treatment Plan for details.   9/01/17: Team members met with Bernadine, discussed program, process, progress and set treatment goals with her. See Individualized treatment goals for details.   : Absent.  8/07/17: first day in program.    Is this a Weekly Review of the Progress on the Treatment Plan?  No.

## 2017-11-08 NOTE — PROGRESS NOTES
"Adult Mental Health Outpatient Group Therapy Progress Note        Client Initial Individualized Goals for Treatment:\"Positive structure.  Concerned about backsliding.  I want to maintain where I am now because I am doing much better.  Continue to move forward.  Work towards finding a part time job- figure out what makes sense as first steps and what type of job to pursue first.\".      Treatment Goals from Individualized Treatment Plan:   1) Safety: Client will notify staff when needing assistance to develop or implement a coping plan to manage suicidal or self injurious urges.  Client will use coping plan for safety, as needed.  2) Symptom Management: Bernadine will process life stressors and identify ways to work through and problem solve current issues as they come up.  3) Life Skills: n life skills Bernadine will learn, explore, practice and apply 2-3 skills/strategies that promote mindfulness and help her establish routine and life balance specifically in area of finding employment. Report on progress weekly.   4) Wellness/D.c planning: Will improve wellness related behaviors by setting wellness goals weekly. Reporting on progress weekly     Area of Treatment Focus:  Symptom Management, Personal Safety and Community Resources/Discharge Planning    Therapeutic Interventions/Treatment Strategies:  Support, Feedback, Safety Assessments, Clarification, Education and Cognitive Behavioral Therapy    Response to Treatment Strategies:  Accepted Feedback, Gave Feedback, Listened, Attentive and Alert, Needed some redirection    Name of Group:  Group Psychotherapy at 9:00 am and MHM at 11:00 am     Description and Outcome:   Bernadine reported that she is still frustrated today. She tried to go to the gym yesterday but then her motivation and energy was so low.  She also reported that she is scared because she does not have any sense of purpose in her life.  She hates everything about her life right now.  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  She did not complain as much about her parents today but she does consistently blame them for her mental health status.  The goal that Bernadine works on in this group is #2.  Bernadine can understand the skills and concepts introduced in group but she continues to be willful about making any changes in her own behaviors and letting go of her core beliefs that keep her stuck.  Her own emotional dysregulation gets in her way when trying to use the skills.  She benefits from being validated and encouragement to keep trying her skills. Her suicidal ideation is only passive in nature.    MHM  The client attended Mental Health Management group and the topic was how to deal with Automatic Negative thoughts.  She did a good job in coming up with a thought that she struggles with and then working to dispute it on her own.      Is this a Weekly Review of the Progress on the Treatment Plan?  No.      Are Treatment Plan Goals being addressed?  Yes, continue treatment goals      Are Treatment Plan Strategies to Address Goals Effective?  Yes, continue treatment strategies      Are there any current contracts in place?  No

## 2017-11-09 NOTE — PROGRESS NOTES
"Adult Mental Health Outpatient Group Therapy Progress Note     Date: 11/08/17  Time: 10:00-10:50    Client Initial Individualized Goals for Treatment:\"Positive structure.  Concerned about backsliding.  I want to maintain where I am now because I am doing much better.  Continue to move forward.  Work towards finding a part time job- figure out what makes sense as first steps and what type of job to pursue first.\".      Treatment Goals from Individualized Treatment Plan:   1) Safety: Client will notify staff when needing assistance to develop or implement a coping plan to manage suicidal or self injurious urges.  Client will use coping plan for safety, as needed.  2) Symptom Management: Bernadine will process life stressors and identify ways to work through and problem solve current issues as they come up.  3) Life Skills: In life skills Bernadine will learn, explore, practice and apply 2-3 skills/strategies that promote mindfulness and help her establish routine and life balance specifically in area of finding employment. Report on progress weekly.   4) Wellness/D.c planning: Will improve wellness related behaviors by setting wellness goals weekly. Reporting on progress weekly      Area of Treatment Focus:  Symptom Management, Personal Safety, Community Resources/Discharge Planning, Develop / Improve Independent Living Skills and Develop Socialization / Interpersonal Relationship Skills    Therapeutic Interventions/Treatment Strategies:  Support, Redirection, Feedback, Limit/Boundaries, Safety Assessments, Structured Activity, Problem Solving, Clarification and Education    Response to Treatment Strategies:  Gave Feedback, Listened, Attentive and Alert    Name of Group:  Self support skills    Description and Outcome: Bernadine attended and participated in a structured self-support skills group where intervention focuses on learning, developing, and practicing coping and life skills and strategies through structured experiential " psycho-education to improve function in valued roles, routines, relationships, and independent living skills.     Today the focus of the session is on life balance/structure/routine. Psycho-ed provided around motivation and procrastination. Bernadine engages in an experiential structured activity to practice self-compassion as she struggles with low energy and decreased motivation. Validation and support provided. Encouraged to continue work on setting small achievable goals for herself instead of the big picture which triggers her self-doubt. Her affect is even, she works hard and is very engaged in the process. She would benefit from additional opportunities to practice and implement content from this session.    Treatment Planning Meetings:  11/03/17: Team met with Bernadine, progressing, See Treatment plan for details.  10/03/17: Team met with Bernadine, discussed progress. See Treatment Plan for details.   9/01/17: Team members met with Bernadine, discussed program, process, progress and set treatment goals with her. See Individualized treatment goals for details.   : Absent.  8/07/17: first day in program.    Is this a Weekly Review of the Progress on the Treatment Plan?  No.

## 2017-11-14 ENCOUNTER — TELEPHONE (OUTPATIENT)
Dept: BEHAVIORAL HEALTH | Facility: CLINIC | Age: 42
End: 2017-11-14

## 2017-11-15 ENCOUNTER — HOSPITAL ENCOUNTER (OUTPATIENT)
Dept: BEHAVIORAL HEALTH | Facility: CLINIC | Age: 42
End: 2017-11-15
Attending: NURSE PRACTITIONER
Payer: MEDICARE

## 2017-11-15 PROCEDURE — H2012 BEHAV HLTH DAY TREAT, PER HR: HCPCS

## 2017-11-17 NOTE — ADDENDUM NOTE
Encounter addended by: Dorothy Last RN on: 11/17/2017  3:20 PM<BR>     Actions taken: Flowsheet accepted

## 2017-11-20 NOTE — PROGRESS NOTES
"Adult Mental Health Outpatient Group Therapy Progress Note      Client Initial Individualized Goals for Treatment:\"Positive structure.  Concerned about backsliding.  I want to maintain where I am now because I am doing much better.  Continue to move forward.  Work towards finding a part time job- figure out what makes sense as first steps and what type of job to pursue first.\".      Treatment Goals from Individualized Treatment Plan:   1) Safety: Client will notify staff when needing assistance to develop or implement a coping plan to manage suicidal or self injurious urges.  Client will use coping plan for safety, as needed.  2) Symptom Management: Bernadine will process life stressors and identify ways to work through and problem solve current issues as they come up.  3) Life Skills: n life skills Bernadine will learn, explore, practice and apply 2-3 skills/strategies that promote mindfulness and help her establish routine and life balance specifically in area of finding employment. Report on progress weekly.   4) Wellness/D.c planning: Will improve wellness related behaviors by setting wellness goals weekly. Reporting on progress weekly       Area of Treatment Focus:  Symptom Management, Personal Safety and Community Resources/Discharge Planning    Therapeutic Interventions/Treatment Strategies:  Structured Activity and Education    Response to Treatment Strategies:  Listened and Focused on Goals    Name of Group:  Mental Health Management     Description and Outcome:  Mental Health Management:  Client participated in educational presentation about mental health stigma.  Client learned about external and internal stigma.  Client learned skills for managing information about mental health with support people, acquaintances, and strangers.  Client learned skills to set boundaries, to share information in a planned manner, and to manage self-talk.    Client demonstrated understanding of session content by identifying stigma " she has experienced.      Is this a Weekly Review of the Progress on the Treatment Plan?  No

## 2017-11-21 ENCOUNTER — HOSPITAL ENCOUNTER (OUTPATIENT)
Dept: BEHAVIORAL HEALTH | Facility: CLINIC | Age: 42
End: 2017-11-21
Attending: NURSE PRACTITIONER
Payer: MEDICARE

## 2017-11-21 PROCEDURE — H2012 BEHAV HLTH DAY TREAT, PER HR: HCPCS

## 2017-11-21 NOTE — PROGRESS NOTES
"Adult Mental Health Outpatient Group Therapy Progress Note     Date: 11/21/17  Time: 11:00-11:50    Client Initial Individualized Goals for Treatment:\"Positive structure.  Concerned about backsliding.  I want to maintain where I am now because I am doing much better.  Continue to move forward.  Work towards finding a part time job- figure out what makes sense as first steps and what type of job to pursue first.\".      Treatment Goals from Individualized Treatment Plan:   1) Safety: Client will notify staff when needing assistance to develop or implement a coping plan to manage suicidal or self injurious urges.  Client will use coping plan for safety, as needed.  2) Symptom Management: Bernadine will process life stressors and identify ways to work through and problem solve current issues as they come up.  3) Life Skills: In life skills Bernadine will learn, explore, practice and apply 2-3 skills/strategies that promote mindfulness and help her establish routine and life balance specifically in area of finding employment. Report on progress weekly.   4) Wellness/D.c planning: Will improve wellness related behaviors by setting wellness goals weekly. Reporting on progress weekly      Area of Treatment Focus:  Symptom Management, Personal Safety, Community Resources/Discharge Planning, Develop / Improve Independent Living Skills and Develop Socialization / Interpersonal Relationship Skills    Therapeutic Interventions/Treatment Strategies:  Support, Redirection, Feedback, Limit/Boundaries, Safety Assessments, Structured Activity, Problem Solving, Clarification and Education    Response to Treatment Strategies:  Gave Feedback, Listened, Attentive and Alert    Name of Group:  Self support skills    Description and Outcome: Bernadine attended and participated in a structured self-support skills group where intervention focuses on learning, developing, and practicing coping and life skills and strategies through structured experiential " psycho-education to improve function in valued roles, routines, relationships, and independent living skills.     Today the focus of the session is on self regulation and sensory based coping skills. Session was held in sensory controlled room to experience lighting, materials, seating that allows group members to explore their perception of a variety of sensory input (external and internal),  and working on identifying what they find calming and what they find alerting. Also discussed was the calming and grounding impact of cognitive and movement based activities on activating the parasympathetic nervous system. Lastly gorup members were provided opportunity to create a self-regulation list, which focuses on what they find helps them calm or alert themselves, the discussed possible uses of them over the holiday weekend. Bernadine is engaged, asks good questions, and is able to identify a variety of sensory experiences she finds calming. She really liking the weighted blanket and its impact on her level of anxiety. Validation and support provided, she is supportive of peers. Affect even. Mood congruent with improvemeent noted. Bernadine would benefit from additional opportunities to practice and implement content from this session and work towards her goals.     Treatment Planning Meetings:  11/03/17: Team met with Bernadine, progressing, See Treatment plan for details.  10/03/17: Team met with Bernadine, discussed progress. See Treatment Plan for details.   9/01/17: Team members met with Bernadine, discussed program, process, progress and set treatment goals with her. See Individualized treatment goals for details.   : Absent.  8/07/17: first day in program.    Is this a Weekly Review of the Progress on the Treatment Plan?  Yes.      Are Treatment Plan Goals being addressed?  Yes, continue treatment goals      Are Treatment Plan Strategies to Address Goals Effective?  Yes, continue treatment strategies      Are there any current  contracts in place?  No

## 2017-11-22 ENCOUNTER — HOSPITAL ENCOUNTER (OUTPATIENT)
Dept: BEHAVIORAL HEALTH | Facility: CLINIC | Age: 42
End: 2017-11-22
Attending: NURSE PRACTITIONER
Payer: MEDICARE

## 2017-11-22 PROCEDURE — H2012 BEHAV HLTH DAY TREAT, PER HR: HCPCS

## 2017-11-22 NOTE — PROGRESS NOTES
"Adult Mental Health Outpatient Group Therapy Progress Note        Client Initial Individualized Goals for Treatment:\"Positive structure.  Concerned about backsliding.  I want to maintain where I am now because I am doing much better.  Continue to move forward.  Work towards finding a part time job- figure out what makes sense as first steps and what type of job to pursue first.\".      Treatment Goals from Individualized Treatment Plan:   1) Safety: Client will notify staff when needing assistance to develop or implement a coping plan to manage suicidal or self injurious urges.  Client will use coping plan for safety, as needed.  2) Symptom Management: Bernadine will process life stressors and identify ways to work through and problem solve current issues as they come up.  3) Life Skills: n life skills Bernadine will learn, explore, practice and apply 2-3 skills/strategies that promote mindfulness and help her establish routine and life balance specifically in area of finding employment. Report on progress weekly.   4) Wellness/D.c planning: Will improve wellness related behaviors by setting wellness goals weekly. Reporting on progress weekly     Area of Treatment Focus:  Symptom Management, Personal Safety and Community Resources/Discharge Planning    Therapeutic Interventions/Treatment Strategies:  Support, Feedback, Safety Assessments, Clarification, Education and Cognitive Behavioral Therapy    Response to Treatment Strategies:  Accepted Feedback, Gave Feedback, Listened, Attentive and Alert, Needed some redirection    Name of Group:  Group Psychotherapy at 10:00 am    Description and Outcome:   Bernadine reported that she is still frustrated today. She is frustrated because she does not know what to do with her parents and going to family therapy.  She is very stuck on how they are remembering their past and how they treated Bernadine.  She feels like therapy is going no where but is still meeting with them on a weekly basis. " The goal that Bernadine works on in this group is #2. This goal is mainly concerned with learning new coping skills that can help her with stressors that come up everyday for her. Bernadine can understand the skills and concepts introduced in group but she continues to be stuck when trying to make any changes in her own behaviors.  It seems very hard for Bernadine to change any of her core beliefs that keep her stuck.  Her own emotional dysregulation gets in her way when trying to use the skills.  She benefits from being validated and encouragement to keep trying her skills. Her suicidal ideation is only passive in nature.  She does not like living with her parents but has no alternative at this time in her life.      Is this a Weekly Review of the Progress on the Treatment Plan?  Yes.      Are Treatment Plan Goals being addressed?  Yes, continue treatment goals      Are Treatment Plan Strategies to Address Goals Effective?  Yes, continue treatment strategies      Are there any current contracts in place?  No

## 2017-11-22 NOTE — PROGRESS NOTES
Group Therapy Progress Notes     Area of Treatment Focus:  Symptom Management, Abstinence/Relapse Prevention, Develop / Improve Independent Living Skills and Develop Socialization / Interpersonal Relationship Skills    Therapeutic Interventions/Treatment Strategies:  Support, Redirection, Limit/Boundaries, Structured Activity, Clarification, Education and Cognitive Behavioral Therapy    Response to Treatment Strategies:  Accepted Feedback, Gave Feedback, Listened, Focused on Goals, Attentive, Accepted Support, Alert and Demonstrates Behavior Change    Name of Group:  Mental Health Managment    Progress Note  Positive Copers: given a context and handout on depression symptoms and strategies, we focused on 6 domains with suggested ways to cope in positive ways: Physical, mental, family, interpersonal, spiritual, diversions. Bernadine was honest with her struggles in seeing priorities for herself in each domain, and to see how to start with family when it is conflictual. She was assisted to find the priority within each box with support and listening and suggestions.  At one point, due to Bernadine's trouble with self esteem, clients wrote their name on a small paper and handed it to the right until it got all around the [place], and they wrote down positive and true things about each person and at the end, each woman read what what was written down as her strengths as seen by the group. Some of the members were new to each other. Bernadine was surprised that people gave her true and insightful feedback, when they hardly knew her.      Is this a Weekly Review of the Progress on the Treatment Plan?  No

## 2017-11-22 NOTE — PROGRESS NOTES
"Adult Mental Health Outpatient Group Therapy Progress Note     Client Initial Individualized Goals for Treatment: \"Positive structure.  Concerned about backsliding.  I want to maintain where I am now because I am doing much better.  Continue to move forward.  Work towards finding a part time job- figure out what makes sense as first steps and what type of job to pursue first.\".     See Initial Treatment suggestions for the client during the time between Diagnostic Assessment and completion of the Master Individualized Treatment Plan.    Treatment Goals:  1) Safety: Client will notify staff when needing assistance to develop or implement a coping plan to manage suicidal or self injurious urges.  Client will use coping plan for safety, as needed.  2) Symptom Management: Bernadine will process life stressors and identify ways to work through and problem solve current issues as they come up.  3) Life Skills: n life skills Bernadine will learn, explore, practice and apply 2-3 skills/strategies that promote mindfulness and help her establish routine and life balance specifically in area of finding employment. Report on progress weekly.   4) Wellness/D.c planning: Will improve wellness related behaviors by setting wellness goals weekly. Reporting on progress weekly     Area of Treatment Focus:  Symptom Management    Therapeutic Interventions/Treatment Strategies:  Support, Feedback, Safety Assessments, Structured Activity and Education    Response to Treatment Strategies:  Accepted Feedback, Listened, Attentive, Accepted Support and Alert    Name of Group:  Self Support Skills    Description and Outcome:  Client presented with calm,even mood.Client spent some time working on some leisure activities and scializing with therapist and her peers.  Client would benefit from additional opportunities to practice and implement content from this session in everyday life,interpersonal relationships and mental health recovery.    Is this a " Weekly Review of the Progress on the Treatment Plan?  Yes.      Are Treatment Plan Goals being addressed?  Yes, continue treatment goals      Are Treatment Plan Strategies to Address Goals Effective?  Yes, continue treatment strategies      Are there any current contracts in place?  No

## 2017-11-22 NOTE — PROGRESS NOTES
"Adult Mental Health Outpatient Group Therapy Progress Note        Client Initial Individualized Goals for Treatment:\"Positive structure.  Concerned about backsliding.  I want to maintain where I am now because I am doing much better.  Continue to move forward.  Work towards finding a part time job- figure out what makes sense as first steps and what type of job to pursue first.\".      Treatment Goals from Individualized Treatment Plan:   1) Safety: Client will notify staff when needing assistance to develop or implement a coping plan to manage suicidal or self injurious urges.  Client will use coping plan for safety, as needed.  2) Symptom Management: Bernadine will process life stressors and identify ways to work through and problem solve current issues as they come up.  3) Life Skills: n life skills Bernadine will learn, explore, practice and apply 2-3 skills/strategies that promote mindfulness and help her establish routine and life balance specifically in area of finding employment. Report on progress weekly.   4) Wellness/D.c planning: Will improve wellness related behaviors by setting wellness goals weekly. Reporting on progress weekly     Area of Treatment Focus:  Symptom Management, Personal Safety and Community Resources/Discharge Planning    Therapeutic Interventions/Treatment Strategies:  Support, Feedback, Safety Assessments, Clarification, Education and Cognitive Behavioral Therapy    Response to Treatment Strategies:  Accepted Feedback, Gave Feedback, Listened, Attentive and Alert, Needed some redirection    Name of Group:  Group Psychotherapy at 10:00 am    Description and Outcome:   Bernadine reported that she is still frustrated today. She is frustrated because she does not know what to do with her parents in general.  Today she was also bothered because the group had to merge with another group because of poor client attendance.  Bernadine does not do all that well when things have to be changed.  Her sense of " "injustice is very high and most stuff feels \"unfair\" to her. The goal that Bernadine works on in this group is #2. This goal is mainly concerned with learning new coping skills that can help her with stressors that come up everyday for her. Bernadine can understand the skills and concepts introduced in group but she continues to be stuck when trying to make any changes in her own behaviors.  It seems very hard for Bernadine to change any of her core beliefs that keep her stuck.  Her own emotional dysregulation gets in her way when trying to use the skills.  She benefits from being validated and encouragement to keep trying her skills. Her suicidal ideation is only passive in nature.  She does not like living with her parents but has no alternative at this time in her life.      Is this a Weekly Review of the Progress on the Treatment Plan?  No.      Are Treatment Plan Goals being addressed?  Yes, continue treatment goals      Are Treatment Plan Strategies to Address Goals Effective?  Yes, continue treatment strategies      Are there any current contracts in place?  No        "

## 2017-11-29 ENCOUNTER — HOSPITAL ENCOUNTER (OUTPATIENT)
Dept: BEHAVIORAL HEALTH | Facility: CLINIC | Age: 42
End: 2017-11-29
Attending: NURSE PRACTITIONER
Payer: MEDICARE

## 2017-11-29 PROCEDURE — H2012 BEHAV HLTH DAY TREAT, PER HR: HCPCS

## 2017-11-29 NOTE — PROGRESS NOTES
Psychiatry staffing: case discussed  Diagnosis: Bipolar I. Borderline PD. Alcohol dependency.     Current Outpatient Prescriptions   Medication     TRAZODONE HCL PO     BuPROPion HCl (WELLBUTRIN PO)     lamoTRIgine (LAMICTAL) 150 MG tablet     levothyroxine (SYNTHROID/LEVOTHROID) 50 MCG tablet     Vilazodone HCl (VIIBRYD PO)     norethindrone-ethinyl estradiol (MICROGESTIN 1.5/30) 1.5-30 MG-MCG per tablet     etonogestrel-ethinyl estradiol (NUVARING) 0.12-0.015 MG/24HR vaginal ring     No current facility-administered medications for this encounter.      Past Medical History:   Diagnosis Date     Depression     In counseling; has Psychiatrist     Depressive disorder

## 2017-12-01 NOTE — PROGRESS NOTES
"Adult Mental Health Outpatient Group Therapy Progress Note        Client Initial Individualized Goals for Treatment:\"Positive structure.  Concerned about backsliding.  I want to maintain where I am now because I am doing much better.  Continue to move forward.  Work towards finding a part time job- figure out what makes sense as first steps and what type of job to pursue first.\".      Treatment Goals from Individualized Treatment Plan:   1) Safety: Client will notify staff when needing assistance to develop or implement a coping plan to manage suicidal or self injurious urges.  Client will use coping plan for safety, as needed.  2) Symptom Management: Bernadine will process life stressors and identify ways to work through and problem solve current issues as they come up.  3) Life Skills: n life skills Bernadine will learn, explore, practice and apply 2-3 skills/strategies that promote mindfulness and help her establish routine and life balance specifically in area of finding employment. Report on progress weekly.   4) Wellness/D.c planning: Will improve wellness related behaviors by setting wellness goals weekly. Reporting on progress weekly     Area of Treatment Focus:  Symptom Management, Personal Safety and Community Resources/Discharge Planning    Therapeutic Interventions/Treatment Strategies:  Support, Feedback, Safety Assessments, Clarification, Education and Cognitive Behavioral Therapy    Response to Treatment Strategies:  Accepted Feedback, Gave Feedback, Listened, Attentive and Alert, Needed some redirection    Name of Group:  Group Psychotherapy at 9:00 am and MHM at 11:00 am    Description and Outcome:   Bernadine reported that she is still frustrated with her family. She is frustrated because she does not know what to do with her parents in general.  She was also bothered because her brother and sister-in-law did not include her in on a project for their child which included all of the family.  The holidays in " general were a trigger for her and it was highlighted to her about how others do not treat her appropriately.  The goal that Bernadine works on in this group is #2. This goal is mainly concerned with learning new coping skills that can help her with stressors that come up everyday for her. Bernadine can understand the skills and concepts introduced in group but she continues to be stuck when trying to make any changes in her own behaviors.  It seems very hard for Bernadine to change any of her core beliefs that keep her stuck.  Her own emotional dysregulation gets in her way when trying to use the skills.  She benefits from being validated and encouragement to keep trying her skills. Her suicidal ideation is only passive in nature.  She does not like living with her parents but has no alternative at this time in her life.    MHM  The client attended Mental Health Management group and the topic was about how to work with other's expectations and your own expectations when things do not work out as planned.  The group also worked with the topic of resentments.      Is this a Weekly Review of the Progress on the Treatment Plan?  Yes.      Are Treatment Plan Goals being addressed?  Yes, continue treatment goals      Are Treatment Plan Strategies to Address Goals Effective?  Yes, continue treatment strategies      Are there any current contracts in place?  No

## 2017-12-05 ENCOUNTER — HOSPITAL ENCOUNTER (OUTPATIENT)
Dept: BEHAVIORAL HEALTH | Facility: CLINIC | Age: 42
End: 2017-12-05
Attending: NURSE PRACTITIONER
Payer: MEDICARE

## 2017-12-05 PROCEDURE — H2012 BEHAV HLTH DAY TREAT, PER HR: HCPCS

## 2017-12-05 NOTE — PROGRESS NOTES
"  Adult Mental Health Outpatient Group Therapy Progress Note     Client Initial Individualized Goals for Treatment:\"Positive structure.  Concerned about backsliding.  I want to maintain where I am now because I am doing much better.  Continue to move forward.  Work towards finding a part time job- figure out what makes sense as first steps and what type of job to pursue first.\".       Treatment Goals from Individualized Treatment Plan:   1) Safety: Client will notify staff when needing assistance to develop or implement a coping plan to manage suicidal or self injurious urges.  Client will use coping plan for safety, as needed.  2) Symptom Management: Bernadine will process life stressors and identify ways to work through and problem solve current issues as they come up.  3) Life Skills: n life skills Bernadine will learn, explore, practice and apply 2-3 skills/strategies that promote mindfulness and help her establish routine and life balance specifically in area of finding employment. Report on progress weekly.   4) Wellness/D.c planning: Will improve wellness related behaviors by setting wellness goals weekly. Reporting on progress weekly       Area of Treatment Focus:  Symptom Management, Personal Safety and Develop Socialization / Interpersonal Relationship Skills    Therapeutic Interventions/Treatment Strategies:  Support, Feedback, Safety Assessments and Clarification    Response to Treatment Strategies:  Accepted Feedback, Gave Feedback, Listened, Focused on Goals, Attentive, Accepted Support and Alert    Name of Group:  psychotherapy     Description and Outcome:  Bernadine shared that she was feeling frustrated with mood and progress.  Aware of coping skills, such as opposite action, that she has not been utilizing.  States that she knows exercise would be helpful but is struggling with motivation.  Bernadine appeared resistant to feedback however accepted validation regarding feeling of frustration.  Accepted reminders " about self compassion.  Bernadine verbalized understanding of coping skills but would benefit from further support in problem solving barriers, such as motivation.  No safety concerns.    Is this a Weekly Review of the Progress on the Treatment Plan?  No

## 2017-12-06 ENCOUNTER — HOSPITAL ENCOUNTER (OUTPATIENT)
Dept: BEHAVIORAL HEALTH | Facility: CLINIC | Age: 42
End: 2017-12-06
Attending: NURSE PRACTITIONER
Payer: MEDICARE

## 2017-12-06 PROCEDURE — H2012 BEHAV HLTH DAY TREAT, PER HR: HCPCS

## 2017-12-06 NOTE — PROGRESS NOTES
"Adult Mental Health Outpatient Group Therapy Progress Note        Client Initial Individualized Goals for Treatment:\"Positive structure.  Concerned about backsliding.  I want to maintain where I am now because I am doing much better.  Continue to move forward.  Work towards finding a part time job- figure out what makes sense as first steps and what type of job to pursue first.\".      Treatment Goals from Individualized Treatment Plan:   1) Safety: Client will notify staff when needing assistance to develop or implement a coping plan to manage suicidal or self injurious urges.  Client will use coping plan for safety, as needed.  2) Symptom Management: Bernadine will process life stressors and identify ways to work through and problem solve current issues as they come up.  3) Life Skills: n life skills Bernadine will learn, explore, practice and apply 2-3 skills/strategies that promote mindfulness and help her establish routine and life balance specifically in area of finding employment. Report on progress weekly.   4) Wellness/D.c planning: Will improve wellness related behaviors by setting wellness goals weekly. Reporting on progress weekly     Area of Treatment Focus:  Symptom Management, Personal Safety and Community Resources/Discharge Planning    Therapeutic Interventions/Treatment Strategies:  Support, Feedback, Safety Assessments, Clarification, Education and Cognitive Behavioral Therapy    Response to Treatment Strategies:  Accepted Feedback, Gave Feedback, Listened, Attentive and Alert, Needed some redirection    Name of Group:  Group Psychotherapy at 9:00 am and MHM at 11:00 am    Description and Outcome:   Bernadine reported that she is still frustrated with her family. She is frustrated because she does not know what to do with her parents in general.  She wants them to own up to their decisions and actions that they took when she was younger. She goes down a \"rabbit hole\" as she describes it when she is talking " about her past.  She also acknowledged that she is really good at avoiding the things that she needs to be paying attention to in her life. Today she met with this writer and the team's OT to talk about discharge planning.  Her discharge will be in early January. The goal that Bernadine works on in this group is #2. This goal is mainly concerned with learning new coping skills that can help her with stressors that come up everyday for her. Bernadine can understand the skills and concepts introduced in group but she continues to be stuck when trying to make any changes in her own behaviors.  It seems very hard for Bernadine to change any of her core beliefs that keep her stuck.  Her own emotional dysregulation gets in her way when trying to use the skills.  She benefits from being validated and encouragement to keep trying her skills. Her suicidal ideation is only passive in nature.  She does not like living with her parents but has no alternative at this time in her life.      Is this a Weekly Review of the Progress on the Treatment Plan?  Yes.      Are Treatment Plan Goals being addressed?  Yes, continue treatment goals      Are Treatment Plan Strategies to Address Goals Effective?  Yes, continue treatment strategies      Are there any current contracts in place?  No

## 2017-12-06 NOTE — PROGRESS NOTES
Acknowledgement of Current Treatment Plan       I have reviewed my treatment plan with my therapist / counselor on 01/05/2017. I agree with the plan as it is written in the electronic health record.    Name Signature   Bernadine Samaniego    Name of Therapist / Counselor    [facility] Wharton[facility]  Mary Ellen Wood[facility]  Andrew Osborn, OT  MARGOTH Bingham Aneliya, CNP

## 2017-12-08 ENCOUNTER — HOSPITAL ENCOUNTER (OUTPATIENT)
Dept: BEHAVIORAL HEALTH | Facility: CLINIC | Age: 42
End: 2017-12-08
Attending: NURSE PRACTITIONER
Payer: MEDICARE

## 2017-12-08 PROCEDURE — H2012 BEHAV HLTH DAY TREAT, PER HR: HCPCS

## 2017-12-11 NOTE — PROGRESS NOTES
"Adult Mental Health Outpatient Group Therapy Progress Note     Date: 12/08/17  Time: 10:00-10:50    Client Initial Individualized Goals for Treatment:\"Positive structure.  Concerned about backsliding.  I want to maintain where I am now because I am doing much better.  Continue to move forward.  Work towards finding a part time job- figure out what makes sense as first steps and what type of job to pursue first.\".      Treatment Goals from Individualized Treatment Plan:   1) Safety: Client will notify staff when needing assistance to develop or implement a coping plan to manage suicidal or self injurious urges.  Client will use coping plan for safety, as needed.  2) Symptom Management: Bernadine will process life stressors and identify ways to work through and problem solve current issues as they come up.  3) Life Skills: In life skills Bernadine will learn, explore, practice and apply 2-3 skills/strategies that promote mindfulness and help her establish routine and life balance specifically in area of finding employment. Report on progress weekly.   4) Wellness/D.c planning: Will improve wellness related behaviors by setting wellness goals weekly. Reporting on progress weekly      Area of Treatment Focus:  Symptom Management, Personal Safety, Community Resources/Discharge Planning, Develop / Improve Independent Living Skills and Develop Socialization / Interpersonal Relationship Skills    Therapeutic Interventions/Treatment Strategies:  Support, Redirection, Feedback, Limit/Boundaries, Safety Assessments, Structured Activity, Problem Solving, Clarification and Education    Response to Treatment Strategies:  Gave Feedback, Listened, Attentive and Alert    Name of Group:  Self support skills    Description and Outcome: Bernadine attended and participated in a structured self-support skills group where intervention focuses on learning, developing, and practicing coping and life skills and strategies through structured experiential " psycho-education to improve function in valued roles, routines, relationships, and independent living skills.     Today the focus of the session is on positive reflection and wellness. Bernadine is able to fully engage in the process today, endorsing she understands the purpose of reflecting on what is good and is working and her progress. She continues to struggle with ongoing stressors and is working hard on radically accepting things she cannot change. She made a wellness plan for the weekend which included going to the gym, out with her boyfriend and a party with her bible study group showing she considered balance and some structure. She problem solved with peers a barrier to her enjoying the party and verbalized she appreciated the input. She demonstrated understanding of session content and purpose by participating in all aspects of the structured activity and sharing her reflections and plans with group    Treatment Planning Meetings:  12/06/17: Met with team, progressing. Set discharge date for January 9th.  11/03/17: Team met with renae Colindres, See Treatment plan for details.  10/03/17: Team met with Bernadine, discussed progress. See Treatment Plan for details.   9/01/17: Team members met with Bernadine, discussed program, process, progress and set treatment goals with her. See Individualized treatment goals for details.   : Absent.  8/07/17: first day in program.    Is this a Weekly Review of the Progress on the Treatment Plan?  Yes.      Are Treatment Plan Goals being addressed?  Yes, continue treatment goals      Are Treatment Plan Strategies to Address Goals Effective?  Yes, continue treatment strategies      Are there any current contracts in place?  No

## 2017-12-12 ENCOUNTER — HOSPITAL ENCOUNTER (OUTPATIENT)
Dept: BEHAVIORAL HEALTH | Facility: CLINIC | Age: 42
End: 2017-12-12
Attending: NURSE PRACTITIONER
Payer: MEDICARE

## 2017-12-12 PROCEDURE — H2012 BEHAV HLTH DAY TREAT, PER HR: HCPCS

## 2017-12-12 PROCEDURE — 99213 OFFICE O/P EST LOW 20 MIN: CPT | Performed by: NURSE PRACTITIONER

## 2017-12-12 NOTE — PROGRESS NOTES
"[facility], [place]   Psychiatric Progress Note        Interim History:   From H&P: \"Bernadine Samaniego is a 41 year old female referred to the [facility] by her psychiatrist after two hospitalizations in the last 6 months. She was living in [facility] residential treatment from April to July of 2017 and is looking for more structured environment in order to continue to improve. Ms. Samaniego has a history of depression dating back to her teen years. She has been hospitalized total of 3 times, n 2013, 2016 and [place] [facility] [place] had one suicide attempt in Nov of 2016 by OD on Ambien and alcohol. She had a plan to buy a gun and shut herself but never acted on it.     Ms. Samaniego states the current episode of depression started about 4 years ago. She feels her symptoms are getting worst in the winter and better in the Summer. Feels that the depression is somewhat better in the last month.  Symptoms include chronic fatigue, low energy and motivation, sleep \"can be better\", isolation, and avoidance. States her sleep varies from 2 to 10 hours but in the last two weeks have been more consistent and averaging 6-8 hours. Denies SI, HI, SIB. Denies manic and psychotic symptoms. States memory and concentration are good, appetite is improving, as well. Fatigue is getting better. States she is seeing a chronic fatigue specialist who is doing an elimination diet, and since she started her energy is much better.    Ms. Samaniego has a therapist she sees once a week, a psychiatrist, Dr. Medina, [employer], she sees once a month, next appt is in the beginning of September.  She has an [employer] worker whom she sees twice a week. Her plan is to find a part time job and move out of her parents home, but feels she is not ready yet. Ms. Samaniego spent her days exercising and doing house chores\".      10/6/2017: Met with client for follow up. States she is feeling more depressed and anxious. Does not have any " "particular stressors but finds it hard to follow her diet and go to the gym. She has been having bouts of nausea and fatigue which makes it hard to get motivated. She started drinking again 2 times a week about 2-3 drinks each time. Understand that alcohol will increase depression and will interfere with medications. She has a history of significant alcohol abuse and knows she needs to stay away from it. Denies SI, SIB, HI. Rates depression as moderate, anxiety as high. Sleep have been a problems as well. Was prescribed Trazodone 100 mg qhs but took it only once; it is not clear why she stopped taking it. She is frustrated by the lack of progress despite her efforts. She is fearful of decompensating and that makes her anxiety and insomnia worst. Naomi saw her psychiatrist, Dr. Cunningham, 2 weeks ago, and has a follow up appointment 10/19/2017. She is still looking for a job but started procrastinating again and not doing as much as she thinks she could. She is still living with her mother and step father.     12/12/2017: Met with patient for follow up. States things are not going well, depression is increased and rated as moderate to high, anxiety is rated as minimal \"mostly worry about my future\". Denies SI, manic and psychotic symptoms. States the winter is hard on her. She always feel more depressed in the winter. She has a SAD lamp but does not use it regularly. She stopped exercising and has been eating more recently \"which does not help\". She is drinking almost on a daily basis, 2-3 drinks during the week and \"more\" on weekends.  Denies getting intoxicated, having blackouts, or withdrawal symptoms when not drinking. Denies cravings. She has been sleeping more in the last week, sleeping during the day and staying up at night, or sleeping all day and night. Her energy is low. She is working part time and knows she needs a full time job if she wants to live on her own. She does not like her [employer] worker and is " "currently in the process of switching to another one. \"I know what I need but they just don't want to help me. Nobody cares\".     Reports starting family therapy with her parents. States they are going with her but not working enough to change their ways. She does not like the family therapy, states it brings up issues she does not like to remember. Not sure if she will continue with it.     She has an appointment with Dr. Grace Medina, her psychiatrist next week as well as her nutritionist. She is researching alternative ways to treat her depression other than medications. States Dr. Medina increased her Wellbutrin but she has not started the new dose yet, she is not convinced that this will be helpful.          Medications:   1. Wellbutrin 300 mg qam. Dose increased to 450 mg but did not started yet.   2. D/C Trazodone on her own \"feel funny\"  3. D/C Vilazodone  4. Lamictal 150 mg qam         Allergies:     Allergies   Allergen Reactions     Suprax [Cefixime]           Labs:   No results found for this or any previous visit (from the past 672 hour(s)).         Psychiatric Examination:                      Weight is 0 lbs 0 oz  There is no height or weight on file to calculate BMI.    Appearance: awake, alert, well groomed, alert, cooperative, mild distress and normal weight  Attitude:  cooperative  Eye Contact:  good  Mood:  anxious and sad   Affect:  mood congruent  Speech:  clear, coherent  Psychomotor Behavior:  fidgeting  Throught Process:  logical and goal oriented  Associations:  no loose associations  Thought Content:  no evidence of suicidal ideation or homicidal ideation, no auditory hallucinations present and no visual hallucinations present  Insight:  good  Judgement:  intact  Oriented to:  time, person, and place  Attention Span and Concentration:  intact  Recent and Remote Memory:  intact            Precautions:           DIagnoses:   1. Major Depressive Disorder, recurrent, moderate  2. R/O Bipolar I " Disorder  3. Alcohol Use Disorder, recurrent, moderate         Plan:   1. Continue of the Outpatient Day Treatment Program. Patient finds the education and support of the the program helpful in keeping current symptoms under control.  2. Continue current medications.   3. The patient was encourage to follow up with PCP and Psychiatrist.   4. The patient was advised to let us know if inpatient admission or further help is needed.  5. Care was coordinated with the treatment team.  Sindi REIS CNP  Date: 12/12/17  Time: 1:51 PM

## 2017-12-13 ENCOUNTER — HOSPITAL ENCOUNTER (OUTPATIENT)
Dept: BEHAVIORAL HEALTH | Facility: CLINIC | Age: 42
End: 2017-12-13
Attending: NURSE PRACTITIONER
Payer: MEDICARE

## 2017-12-13 PROCEDURE — H2012 BEHAV HLTH DAY TREAT, PER HR: HCPCS

## 2017-12-15 ENCOUNTER — HOSPITAL ENCOUNTER (OUTPATIENT)
Dept: BEHAVIORAL HEALTH | Facility: CLINIC | Age: 42
End: 2017-12-15
Attending: NURSE PRACTITIONER
Payer: MEDICARE

## 2017-12-15 PROCEDURE — H2012 BEHAV HLTH DAY TREAT, PER HR: HCPCS

## 2017-12-15 NOTE — PROGRESS NOTES
"Adult Mental Health Outpatient Group Therapy Progress Note        Client Initial Individualized Goals for Treatment:\"Positive structure.  Concerned about backsliding.  I want to maintain where I am now because I am doing much better.  Continue to move forward.  Work towards finding a part time job- figure out what makes sense as first steps and what type of job to pursue first.\".      Treatment Goals from Individualized Treatment Plan:   1) Safety: Client will notify staff when needing assistance to develop or implement a coping plan to manage suicidal or self injurious urges.  Client will use coping plan for safety, as needed.  2) Symptom Management: Bernadine will process life stressors and identify ways to work through and problem solve current issues as they come up.  3) Life Skills: n life skills Bernadine will learn, explore, practice and apply 2-3 skills/strategies that promote mindfulness and help her establish routine and life balance specifically in area of finding employment. Report on progress weekly.   4) Wellness/D.c planning: Will improve wellness related behaviors by setting wellness goals weekly. Reporting on progress weekly     Area of Treatment Focus:  Symptom Management, Personal Safety and Community Resources/Discharge Planning    Therapeutic Interventions/Treatment Strategies:  Support, Feedback, Safety Assessments, Clarification, Education and Cognitive Behavioral Therapy    Response to Treatment Strategies:  Accepted Feedback, Gave Feedback, Listened, Attentive and Alert, Needed some redirection    Name of Group:  Group Psychotherapy at 9:00 am     Description and Outcome:   Bernadine reported that she is still frustrated with her family. She is frustrated because she does not know what to do with her parents in general.  She wants them to own up to their decisions and actions that they took when she was younger. She had a family therapy session with them this week and feels that the therapist is taking " her parents side and taking what they say at face value. She is feeling angry and says she just wants to yell at her parents. She did visit with Christopher and Skylar this week as well and said that went well.  The group discussed ways to manage her angry feelings and whether or not family therapy was actually helping her. This report seems to be consistent with previous discussion in group therapy. She did present overall with an irritable affect.  Bernadine did not report any safety concerns today.     Bernadine demonstrated understanding of the session by participating in the group, reporting on emotions and symptoms, and giving feedback to others.    Is this a Weekly Review of the Progress on the Treatment Plan?  Yes.      Are Treatment Plan Goals being addressed?  Yes, continue treatment goals      Are Treatment Plan Strategies to Address Goals Effective?  Yes, continue treatment strategies      Are there any current contracts in place?  No

## 2017-12-15 NOTE — PROGRESS NOTES
"Adult Mental Health Outpatient Group Therapy Progress Note     Date: 12/12/17  Time: 11:00-11:50    Client Initial Individualized Goals for Treatment:\"Positive structure.  Concerned about backsliding.  I want to maintain where I am now because I am doing much better.  Continue to move forward.  Work towards finding a part time job- figure out what makes sense as first steps and what type of job to pursue first.\".      Treatment Goals from Individualized Treatment Plan:   1) Safety: Client will notify staff when needing assistance to develop or implement a coping plan to manage suicidal or self injurious urges.  Client will use coping plan for safety, as needed.  2) Symptom Management: Bernadine will process life stressors and identify ways to work through and problem solve current issues as they come up.  3) Life Skills: In life skills Bernadine will learn, explore, practice and apply 2-3 skills/strategies that promote mindfulness and help her establish routine and life balance specifically in area of finding employment. Report on progress weekly.   4) Wellness/D.c planning: Will improve wellness related behaviors by setting wellness goals weekly. Reporting on progress weekly      Area of Treatment Focus:  Symptom Management, Personal Safety, Community Resources/Discharge Planning, Develop / Improve Independent Living Skills and Develop Socialization / Interpersonal Relationship Skills    Therapeutic Interventions/Treatment Strategies:  Support, Redirection, Feedback, Limit/Boundaries, Safety Assessments, Structured Activity, Problem Solving, Clarification and Education    Response to Treatment Strategies:  Gave Feedback, Listened, Attentive and Alert    Name of Group:  Self support skills    Description and Outcome:   Bernadine  participated in a structured self-support skills group where intervention focuses on learning, developing, and practicing coping and life skills and strategies through structured experiential " psycho-education to improve function in valued roles, routines, relationships, and independent living skills.     Today the focus of the session is on Resiliency. Group worked to define resiliency and then were provide psycho-education on what it is and the neural correlates in simple terms. They were provided information on the 5 C's of resiliency: calm, compassion, clarity, connections, and competence and discussed. They were provided education on 5 simple short interventions they could use to help them understand the components. Bernaidne is able to engage on an intellectual level. She liked 4 of the 5 interventions but struggled with the fourth which includes a reflection on strengths they have developed. She endorses feeling more hopeless this week, Validation and support provided by writer and her peers. Group was also led through a breathing meditation to help ground and center before next group. positive reflection and wellness. Bernadine would benefit from additional opportunities to learn, practice and integrate content from this session into her daily life. .    Treatment Planning Meetings:  12/06/17: Met with team, progressing. Set discharge date for January 9th.  11/03/17: Team met with renae Colindres, See Treatment plan for details.  10/03/17: Team met with Bernadine, discussed progress. See Treatment Plan for details.   9/01/17: Team members met with Bernadine, discussed program, process, progress and set treatment goals with her. See Individualized treatment goals for details.   : Absent.  8/07/17: first day in program.    Is this a Weekly Review of the Progress on the Treatment Plan?  No.

## 2017-12-19 ENCOUNTER — HOSPITAL ENCOUNTER (OUTPATIENT)
Dept: BEHAVIORAL HEALTH | Facility: CLINIC | Age: 42
End: 2017-12-19
Attending: NURSE PRACTITIONER
Payer: MEDICARE

## 2017-12-19 PROCEDURE — H2012 BEHAV HLTH DAY TREAT, PER HR: HCPCS

## 2017-12-19 NOTE — PROGRESS NOTES
"Adult Mental Health Outpatient Group Therapy Progress Note        Client Initial Individualized Goals for Treatment:\"Positive structure.  Concerned about backsliding.  I want to maintain where I am now because I am doing much better.  Continue to move forward.  Work towards finding a part time job- figure out what makes sense as first steps and what type of job to pursue first.\".      Treatment Goals from Individualized Treatment Plan:   1) Safety: Client will notify staff when needing assistance to develop or implement a coping plan to manage suicidal or self injurious urges.  Client will use coping plan for safety, as needed.  2) Symptom Management: Bernadine will process life stressors and identify ways to work through and problem solve current issues as they come up.  3) Life Skills: n life skills Bernadine will learn, explore, practice and apply 2-3 skills/strategies that promote mindfulness and help her establish routine and life balance specifically in area of finding employment. Report on progress weekly.   4) Wellness/D.c planning: Will improve wellness related behaviors by setting wellness goals weekly. Reporting on progress weekly     Area of Treatment Focus:  Symptom Management, Personal Safety and Community Resources/Discharge Planning    Therapeutic Interventions/Treatment Strategies:  Support, Feedback, Safety Assessments, Clarification, Education and Cognitive Behavioral Therapy    Response to Treatment Strategies:  Accepted Feedback, Gave Feedback, Listened, Attentive and Alert, Needed some redirection    Name of Group:  Group Psychotherapy at 10:00 am    Description and Outcome:   Bernadine reported that she is \"okay\" today.  She is not necessarily looking forward to [place]  Yesterday she worked with an outside worker on how to deal with jealousy and resentment during the holidays.  She does not feel like she gets treated appropriately in her family system.  Today she shared a bit about her mother's own " history which was filled with trauma and loss.  She admitted that she is starting to understand why her mother does the things that she does.  She does forgive her mother. Her discharge will be in early January. The goal that Bernadine works on in this group is #2. This goal is mainly concerned with learning new coping skills that can help her with stressors that come up everyday for her. Today she did demonstrate a lot of patience with other group members.    Bernadine can understand the skills and concepts introduced in group but she continues to be stuck when trying to make any changes in her own behaviors.  It seems very hard for Bernadine to change any of her core beliefs that keep her stuck.  Her own emotional dysregulation gets in her way when trying to use the skills.  She benefits from being validated and encouragement to keep trying her skills. Her suicidal ideation is only passive in nature.  She does not like living with her parents but has no alternative at this time in her life.       Is this a Weekly Review of the Progress on the Treatment Plan?  Yes.      Are Treatment Plan Goals being addressed?  Yes, continue treatment goals      Are Treatment Plan Strategies to Address Goals Effective?  Yes, continue treatment strategies      Are there any current contracts in place?  No

## 2017-12-19 NOTE — PROGRESS NOTES
"Adult Mental Health Outpatient Group Therapy Progress Note     Date: 12/15/17  Time: 10:00-10:50    Client Initial Individualized Goals for Treatment:\"Positive structure.  Concerned about backsliding.  I want to maintain where I am now because I am doing much better.  Continue to move forward.  Work towards finding a part time job- figure out what makes sense as first steps and what type of job to pursue first.\".      Treatment Goals from Individualized Treatment Plan:   1) Safety: Client will notify staff when needing assistance to develop or implement a coping plan to manage suicidal or self injurious urges.  Client will use coping plan for safety, as needed.  2) Symptom Management: Bernadine will process life stressors and identify ways to work through and problem solve current issues as they come up.  3) Life Skills: In life skills Bernadine will learn, explore, practice and apply 2-3 skills/strategies that promote mindfulness and help her establish routine and life balance specifically in area of finding employment. Report on progress weekly.   4) Wellness/D.c planning: Will improve wellness related behaviors by setting wellness goals weekly. Reporting on progress weekly      Area of Treatment Focus:  Symptom Management, Personal Safety, Community Resources/Discharge Planning, Develop / Improve Independent Living Skills and Develop Socialization / Interpersonal Relationship Skills    Therapeutic Interventions/Treatment Strategies:  Support, Redirection, Feedback, Limit/Boundaries, Safety Assessments, Structured Activity, Problem Solving, Clarification and Education    Response to Treatment Strategies:  Gave Feedback, Listened, Attentive and Alert    Name of Group:  Self support skills    Description and Outcome:   Bernadine then participated in a structured self-support skills group where intervention focuses on learning, developing, and practicing coping and life skills and strategies through structured experiential " psycho-education to improve function in valued roles, routines, relationships, and independent living skills.     Today the focus of the session is on weekly positive reflection and wellness. Bernadine is able to fully engage in the process today, she is thorough in her reflection and shares some.  She made a wellness plan for the weekend which included some self care, and she stated she was going to try and do something creative this weekend around the Holidays. Affect even, engaged and supportive of peers. She demonstrated understanding of session content and purpose by participating in all aspects of the structured activity and sharing her reflections and plans with group    Treatment Planning Meetings:  12/06/17: Met with team, progressing. Set discharge date for January 9th.  11/03/17: Team met with renae Colindres, See Treatment plan for details.  10/03/17: Team met with Bernadine, discussed progress. See Treatment Plan for details.   9/01/17: Team members met with Bernadine, discussed program, process, progress and set treatment goals with her. See Individualized treatment goals for details.   : Absent.  8/07/17: first day in program.    Is this a Weekly Review of the Progress on the Treatment Plan?  Yes.      Are Treatment Plan Goals being addressed?  Yes, continue treatment goals      Are Treatment Plan Strategies to Address Goals Effective?  Yes, continue treatment strategies      Are there any current contracts in place?  No

## 2017-12-20 ENCOUNTER — HOSPITAL ENCOUNTER (OUTPATIENT)
Dept: BEHAVIORAL HEALTH | Facility: CLINIC | Age: 42
End: 2017-12-20
Attending: NURSE PRACTITIONER
Payer: MEDICARE

## 2017-12-20 PROCEDURE — H2012 BEHAV HLTH DAY TREAT, PER HR: HCPCS

## 2017-12-20 NOTE — PROGRESS NOTES
"Adult Mental Health Outpatient Group Therapy Progress Note     Date: 12/19/17  Time: 10:00-10:50    Client Initial Individualized Goals for Treatment:\"Positive structure.  Concerned about backsliding.  I want to maintain where I am now because I am doing much better.  Continue to move forward.  Work towards finding a part time job- figure out what makes sense as first steps and what type of job to pursue first.\".      Treatment Goals from Individualized Treatment Plan:   1) Safety: Client will notify staff when needing assistance to develop or implement a coping plan to manage suicidal or self injurious urges.  Client will use coping plan for safety, as needed.  2) Symptom Management: Bernadine will process life stressors and identify ways to work through and problem solve current issues as they come up.  3) Life Skills: In life skills Bernadine will learn, explore, practice and apply 2-3 skills/strategies that promote mindfulness and help her establish routine and life balance specifically in area of finding employment. Report on progress weekly.   4) Wellness/D.c planning: Will improve wellness related behaviors by setting wellness goals weekly. Reporting on progress weekly      Area of Treatment Focus:  Symptom Management, Personal Safety, Community Resources/Discharge Planning, Develop / Improve Independent Living Skills and Develop Socialization / Interpersonal Relationship Skills    Therapeutic Interventions/Treatment Strategies:  Support, Redirection, Feedback, Limit/Boundaries, Safety Assessments, Structured Activity, Problem Solving, Clarification and Education    Response to Treatment Strategies:  Gave Feedback, Listened, Attentive and Alert    Name of Group:  Self support skills    Description and Outcome:   Bernadine then participated in a structured self-support skills group where intervention focuses on learning, developing, and practicing coping and life skills and strategies through structured experiential " psycho-education to improve function in valued roles, routines, relationships, and independent living skills.     Today the focus of the session is on coping patterns with an emphasis on identifying active coping skills vs. negative coping skills. Group members engaged in a facilitated structured activity that included psycho-education on coping patterns and overview of active coping skills.  They practiced the taught skill by identifying a recent emotion/feeling, reflected on their response and whether it was a negative coping mechanism, and then identifying an alternative active coping skill that they could use instead (3x). This practice allows them to become aware of their patterns and start to problem solve how to break dysfunctional coping patterns. It is based on the science of developing anticipatory awareness. Affect even. Bernadine participated fully, shared, and offered ideas and support to peers. She reports this was similar to what she did with her [facility] worker this week.  She demonstrated understanding of session content and purpose by identifying her typical coping patterns both negative and active.     Treatment Planning Meetings:  12/06/17: Met with team, progressing. Set discharge date for January 9th.  11/03/17: Team met with renae Colindres, See Treatment plan for details.  10/03/17: Team met with Bernadine, discussed progress. See Treatment Plan for details.   9/01/17: Team members met with Bernadine, discussed program, process, progress and set treatment goals with her. See Individualized treatment goals for details.   : Absent.  8/07/17: first day in program.    Is this a Weekly Review of the Progress on the Treatment Plan?  No.

## 2017-12-26 ENCOUNTER — HOSPITAL ENCOUNTER (OUTPATIENT)
Dept: BEHAVIORAL HEALTH | Facility: CLINIC | Age: 42
End: 2017-12-26
Attending: NURSE PRACTITIONER
Payer: MEDICARE

## 2017-12-26 PROCEDURE — H2012 BEHAV HLTH DAY TREAT, PER HR: HCPCS

## 2017-12-26 NOTE — ADDENDUM NOTE
Encounter addended by: Dorothy Last RN on: 12/26/2017  4:11 PM<BR>     Actions taken: Flowsheet accepted

## 2017-12-26 NOTE — ADDENDUM NOTE
Encounter addended by: Dorothy Last RN on: 12/26/2017  2:56 PM<BR>     Actions taken: Flowsheet accepted

## 2017-12-26 NOTE — PROGRESS NOTES
"Adult Mental Health Outpatient Group Therapy Progress Note     Client Initial Individualized Goals for Treatment: Positive structure.  Concerned about backsliding.  I want to maintain where I am now because I am doing much better.  Continue to move forward.  Work towards finding a part time job- figure out what makes sense as first steps and what type of job to pursue first.\".     See Initial Treatment suggestions for the client during the time between Diagnostic Assessment and completion of the Master Individualized Treatment Plan.    Treatment Goals:  1) Safety: Client will notify staff when needing assistance to develop or implement a coping plan to manage suicidal or self injurious urges.  Client will use coping plan for safety, as needed.  2) Symptom Management: Bernadine will process life stressors and identify ways to work through and problem solve current issues as they come up.  3) Life Skills: In life skills Bernadine will learn, explore, practice and apply 2-3 skills/strategies that promote mindfulness and help her establish routine and life balance specifically in area of finding employment. Report on progress weekly.   4) Wellness/D.c planning: Will improve wellness related behaviors by setting wellness goals weekly. Reporting on progress weekly     Area of Treatment Focus:  Symptom Management and Develop / Improve Independent Living Skills    Therapeutic Interventions/Treatment Strategies:  Support, Feedback and Education    Response to Treatment Strategies:  Accepted Feedback, Gave Feedback, Listened, Focused on Goals, Attentive and Accepted Support    Name of Group:  Social Support     Description and Outcome:  Facilitator led mindful breathing exercise around just noticing breath without trying to change anything.  Validated difficulties and distractions and taught on different breathing methods to try if counting breaths does not work for them.  Taught and led discussion on strategies to self soothe by " engaging the five senses.  Client participated by providing examples she currently uses in her life and exploring ways she might add more strategies and implement them more frequently for improved coping.  She participated actively and effectively in teaching.    Client verbalized understanding of session content by agreeing to try to find soothing items she could bring with her places in a bag, purse, pocket.    Is this a Weekly Review of the Progress on the Treatment Plan?  No

## 2017-12-26 NOTE — ADDENDUM NOTE
Encounter addended by: Dorothy Last RN on: 12/26/2017  2:59 PM<BR>     Actions taken: Flowsheet accepted

## 2017-12-27 ENCOUNTER — HOSPITAL ENCOUNTER (OUTPATIENT)
Dept: BEHAVIORAL HEALTH | Facility: CLINIC | Age: 42
End: 2017-12-27
Attending: NURSE PRACTITIONER
Payer: MEDICARE

## 2017-12-27 PROCEDURE — H2012 BEHAV HLTH DAY TREAT, PER HR: HCPCS

## 2017-12-27 NOTE — PROGRESS NOTES
"Adult Mental Health Outpatient Group Therapy Progress Note     Client Initial Individualized Goals for Treatment: \"Positive structure.  Concerned about backsliding.  I want to maintain where I am now because I am doing much better.  Continue to move forward.  Work towards finding a part time job- figure out what makes sense as first steps and what type of job to pursue first.\".     See Initial Treatment suggestions for the client during the time between Diagnostic Assessment and completion of the Master Individualized Treatment Plan.    Treatment Goals:  1) Safety: Client will notify staff when needing assistance to develop or implement a coping plan to manage suicidal or self injurious urges.  Client will use coping plan for safety, as needed.  2) Symptom Management: Bernadine will process life stressors and identify ways to work through and problem solve current issues as they come up.  3) Life Skills: n life skills Bernadine will learn, explore, practice and apply 2-3 skills/strategies that promote mindfulness and help her establish routine and life balance specifically in area of finding employment. Report on progress weekly.   4) Wellness/D.c planning: Will improve wellness related behaviors by setting wellness goals weekly. Reporting on progress weekly     Area of Treatment Focus:  Symptom Management    Therapeutic Interventions/Treatment Strategies:  Support, Feedback, Safety Assessments, Structured Activity and Education    Response to Treatment Strategies:  Accepted Feedback, Listened, Attentive, Accepted Support and Alert    Name of Group:  Self Support Skills    Description and Outcome:  Client presented with calm,even mood.Client spent her time during a work session making some phone calls,reading and socializing with peers. Per goal # 3 client reported\"guess I am doing better,hard to get things done\" i.e mail,job search ,etc.  Client would benefit from additional opportunities to practice and implement content " from this session in everyday life,interpersonal relationships and mental health recovery.    Is this a Weekly Review of the Progress on the Treatment Plan?  Yes.      Are Treatment Plan Goals being addressed?  Yes, continue treatment goals      Are Treatment Plan Strategies to Address Goals Effective?  Yes, continue treatment strategies      Are there any current contracts in place?  No

## 2017-12-27 NOTE — PROGRESS NOTES
Psychiatry staffing: case discussed  Diagnosis: MDD, Borderline PD.     Current Outpatient Prescriptions   Medication     TRAZODONE HCL PO     BuPROPion HCl (WELLBUTRIN PO)     lamoTRIgine (LAMICTAL) 150 MG tablet     levothyroxine (SYNTHROID/LEVOTHROID) 50 MCG tablet     Vilazodone HCl (VIIBRYD PO)     norethindrone-ethinyl estradiol (MICROGESTIN 1.5/30) 1.5-30 MG-MCG per tablet     etonogestrel-ethinyl estradiol (NUVARING) 0.12-0.015 MG/24HR vaginal ring     No current facility-administered medications for this encounter.      Past Medical History:   Diagnosis Date     Depression     In counseling; has Psychiatrist     Depressive disorder

## 2017-12-29 ENCOUNTER — HOSPITAL ENCOUNTER (OUTPATIENT)
Dept: BEHAVIORAL HEALTH | Facility: CLINIC | Age: 42
End: 2017-12-29
Attending: NURSE PRACTITIONER
Payer: MEDICARE

## 2017-12-29 PROCEDURE — H2012 BEHAV HLTH DAY TREAT, PER HR: HCPCS

## 2018-01-03 NOTE — PROGRESS NOTES
"Adult Mental Health Outpatient Group Therapy Progress Note     Date: 12/29/17, late entry due to provider UTO  Time: 10:00-10:50    Client Initial Individualized Goals for Treatment:\"Positive structure.  Concerned about backsliding.  I want to maintain where I am now because I am doing much better.  Continue to move forward.  Work towards finding a part time job- figure out what makes sense as first steps and what type of job to pursue first.\".      Treatment Goals from Individualized Treatment Plan:   1) Safety: Client will notify staff when needing assistance to develop or implement a coping plan to manage suicidal or self injurious urges.  Client will use coping plan for safety, as needed.  2) Symptom Management: Bernadine will process life stressors and identify ways to work through and problem solve current issues as they come up.  3) Life Skills: In life skills Bernadine will learn, explore, practice and apply 2-3 skills/strategies that promote mindfulness and help her establish routine and life balance specifically in area of finding employment. Report on progress weekly.   4) Wellness/D.c planning: Will improve wellness related behaviors by setting wellness goals weekly. Reporting on progress weekly      Area of Treatment Focus:  Symptom Management, Personal Safety, Community Resources/Discharge Planning, Develop / Improve Independent Living Skills and Develop Socialization / Interpersonal Relationship Skills    Therapeutic Interventions/Treatment Strategies:  Support, Redirection, Feedback, Limit/Boundaries, Safety Assessments, Structured Activity, Problem Solving, Clarification and Education    Response to Treatment Strategies:  Gave Feedback, Listened, Attentive and Alert    Name of Group:  Self support skills    Description and Outcome:   Bernadine then participated in a structured self-supPort skills group where intervention focuses on learning, developing, and practicing coping and life skills and strategies " "through structured experiential psycho-education to improve function in valued roles, routines, relationships, and independent living skills.     Today the focus of the session is on weekly positive reflection and wellness. Research shows that spending time reflecting on what is good and gratitude is helpful in mental health recovery. Bernadine is able to fully engage in the process today, she is thorough in her reflection and shares easily stating she worked out this week, she is grateful for her healthcare providers/support system, she learned about building willpower from a book she is reading. She also reflected on the progress she made going through her mail that she played a card game with her family and had \"fun\" for the first time in a long time with them. She made a wellness plan for the weekend which included some cleaning her room and doing laundry, and attend her bible study group.  Affect even, engaged and supportive of peers. She demonstrated understanding of session content and purpose by using the opportunity to fully reflect on what is good in her life and noting the positive impact on her mood.     Treatment Planning Meetings:  12/06/17: Met with team, progressing. Set discharge date for January 9th.  11/03/17: Team met with renae Colindres, See Treatment plan for details.  10/03/17: Team met with Bernadine, discussed progress. See Treatment Plan for details.   9/01/17: Team members met with Bernadine, discussed program, process, progress and set treatment goals with her. See Individualized treatment goals for details.   : Absent.  8/07/17: first day in program.    Is this a Weekly Review of the Progress on the Treatment Plan?  Yes.      Are Treatment Plan Goals being addressed?  Yes, continue treatment goals      Are Treatment Plan Strategies to Address Goals Effective?  Yes, continue treatment strategies      Are there any current contracts in place?  No                          "

## 2018-01-05 ENCOUNTER — HOSPITAL ENCOUNTER (OUTPATIENT)
Dept: BEHAVIORAL HEALTH | Facility: CLINIC | Age: 43
End: 2018-01-05
Attending: NURSE PRACTITIONER
Payer: MEDICARE

## 2018-01-05 PROCEDURE — H2012 BEHAV HLTH DAY TREAT, PER HR: HCPCS

## 2018-01-05 NOTE — PROGRESS NOTES
"Adult Mental Health Outpatient Group Therapy Progress Note     Date: 1/05/18  Time: 10:00-10:50    Client Initial Individualized Goals for Treatment:\"Positive structure.  Concerned about backsliding.  I want to maintain where I am now because I am doing much better.  Continue to move forward.  Work towards finding a part time job- figure out what makes sense as first steps and what type of job to pursue first.\".      Treatment Goals from Individualized Treatment Plan:   1) Safety: Client will notify staff when needing assistance to develop or implement a coping plan to manage suicidal or self injurious urges.  Client will use coping plan for safety, as needed.  2) Symptom Management: Bernadine will process life stressors and identify ways to work through and problem solve current issues as they come up.  3) Life Skills: In life skills Bernadine will learn, explore, practice and apply 2-3 skills/strategies that promote mindfulness and help her establish routine and life balance specifically in area of finding employment. Report on progress weekly.   4) Wellness/D.c planning: Will improve wellness related behaviors by setting wellness goals weekly. Reporting on progress weekly      Area of Treatment Focus:  Symptom Management, Personal Safety, Community Resources/Discharge Planning, Develop / Improve Independent Living Skills and Develop Socialization / Interpersonal Relationship Skills    Therapeutic Interventions/Treatment Strategies:  Support, Redirection, Feedback, Limit/Boundaries, Safety Assessments, Structured Activity, Problem Solving, Clarification and Education    Response to Treatment Strategies:  Gave Feedback, Listened, Attentive and Alert    Name of Group:  Self support skills    Description and Outcome:   Bernadine then participated in a structured self-supPort skills group where intervention focuses on learning, developing, and practicing coping and life skills and strategies through structured experiential " psycho-education to improve function in valued roles, routines, relationships, and independent living skills.     Today the focus of the session is on weekly positive reflection and wellness. Research shows that spending time reflecting on what is good and gratitude is helpful in mental health recovery. The GLAD intervention is used in this group (something you are grateful for, learned, achieved, and delighted in). Bernadine is able to fully engage in the process today. She reports she got really sick this past week and is grateful to her mom who took care of her. She is learning about willpower from a book she is reading and she was appreciative of her  who helped her figure out a payment plan. She made a wellness plan for the weekend which included checking her e-mail for her a message from doctor with an eating plan, then grocery shopping, going to gym and starting a new organizational to do list. She shared with group how past efforts were ineffective and is looking forward to trying a new method that she is hopeful about. She will also attend [employer].  Affect even, engaged and supportive of peers. She demonstrated understanding of session content and purpose by using the opportunity to fully reflect on what is good in her life and noting the positive impact on her mood.     Treatment Planning Meetings:  12/06/17: Met with team, progressing. Set discharge date for January 9th. She plans to go to [facility], [facility], and is working on becoming a Peer Support provider.   11/03/17: Team met with renae Colindres, See Treatment plan for details.  10/03/17: Team met with Bernadine, discussed progress. See Treatment Plan for details.   9/01/17: Team members met with Bernadine, discussed program, process, progress and set treatment goals with her. See Individualized treatment goals for details.   : Absent.  8/07/17: first day in program.    Is this a Weekly Review of the Progress on the Treatment Plan?  Yes.      Are Treatment  Plan Goals being addressed?  Yes, continue treatment goals      Are Treatment Plan Strategies to Address Goals Effective?  Yes, continue treatment strategies      Are there any current contracts in place?  No

## 2018-01-09 ENCOUNTER — HOSPITAL ENCOUNTER (OUTPATIENT)
Dept: BEHAVIORAL HEALTH | Facility: CLINIC | Age: 43
End: 2018-01-09
Attending: NURSE PRACTITIONER
Payer: MEDICARE

## 2018-01-09 PROCEDURE — H2012 BEHAV HLTH DAY TREAT, PER HR: HCPCS

## 2018-01-09 NOTE — PROGRESS NOTES
"Adult Mental Health Outpatient Group Therapy Progress Note        Client Initial Individualized Goals for Treatment:\"Positive structure.  Concerned about backsliding.  I want to maintain where I am now because I am doing much better.  Continue to move forward.  Work towards finding a part time job- figure out what makes sense as first steps and what type of job to pursue first.\".      Treatment Goals from Individualized Treatment Plan:   1) Safety: Client will notify staff when needing assistance to develop or implement a coping plan to manage suicidal or self injurious urges.  Client will use coping plan for safety, as needed.  2) Symptom Management: Bernadine will process life stressors and identify ways to work through and problem solve current issues as they come up.  3) Life Skills: n life skills Bernadine will learn, explore, practice and apply 2-3 skills/strategies that promote mindfulness and help her establish routine and life balance specifically in area of finding employment. Report on progress weekly.   4) Wellness/D.c planning: Will improve wellness related behaviors by setting wellness goals weekly. Reporting on progress weekly     Area of Treatment Focus:  Symptom Management, Personal Safety and Community Resources/Discharge Planning    Therapeutic Interventions/Treatment Strategies:  Support, Feedback, Safety Assessments, Clarification, Education and Cognitive Behavioral Therapy    Response to Treatment Strategies:  Accepted Feedback, Gave Feedback, Listened, Attentive and Alert, Needed some redirection    Name of Group:  Group Psychotherapy at 10:00 am    Description and Outcome:   Bernadine reported that she is \"okay\" today.  She has had a lot of improvement since starting in the program.  She is getting ready for discharge on Friday.  She is mainly concerned about her amount of structure. She was okay with the group coming up with some ideas for her. The goal that Bernadine works on in this group is #2. This goal " is mainly concerned with learning new coping skills that can help her with stressors that come up everyday for her. Today she did demonstrate a lot of patience with other group members.    Bernadine can understand the skills and concepts introduced in group. It seems less difficult for Bernadine to change any of her core beliefs that keep her stuck. Bernadine benefits from being validated and receiving encouragement to keep trying her skills. Her suicidal ideation is only passive in nature.  She does not like living with her parents but has no alternative at this time in her life.       Is this a Weekly Review of the Progress on the Treatment Plan?  Yes.      Are Treatment Plan Goals being addressed?  Yes, continue treatment goals      Are Treatment Plan Strategies to Address Goals Effective?  Yes, continue treatment strategies      Are there any current contracts in place?  No

## 2018-01-10 ENCOUNTER — HOSPITAL ENCOUNTER (OUTPATIENT)
Dept: BEHAVIORAL HEALTH | Facility: CLINIC | Age: 43
End: 2018-01-10
Attending: NURSE PRACTITIONER
Payer: MEDICARE

## 2018-01-10 PROCEDURE — H2012 BEHAV HLTH DAY TREAT, PER HR: HCPCS

## 2018-01-11 NOTE — DISCHARGE SUMMARY
Adult Mental Health Intensive Outpatient Discharge Summary/Instructions      Patient: Bernadine Samaniego MRN: [id number]   : 1975 Age: 42 year old Sex: female     Admission Date: 17  Discharge Date: 18  Diagnosis: 296.32 (F33.1) Major Depressive Disorder, Recurrent Episode, Moderate _ and With anxious distress    Focus of Treatment / Progress    Personal Safety: The client has been maintaining her safety consistently for now.  On 01/10/18 she was not reporting any suicidal ideation.     * Follow your safety plan     * Call crisis lines as needed:    [facility] [phone] [facility] [phone]  [facility] [phone] Crisis Connection [phone]  [facility] [phone] [facility] [phone]  [facility] [phone] National Suicide Prevention [phone]  [facility] [phone] Suicide Prevention [phone]  [facility] [phone]    Managing symptoms of:  Anxiety, depression and chronic fatigue.  Bernadine has reported that anxiety increases for her when she avoids projects, etc.  She has been actively reporting on progress that she has been making in this area.  Her depressive symptoms have improved and she has been staying safe.  She has tried to work with her parents on family of origin related issues, even going to family therapy. It was not as helpful as she hoped it would be. There are still some difficulties for her within the family system but she is more accepting of the realities in general.  Throughout the group process Bernadine has been working on her level of fatigue.  It has not improved as much as she would have like it to improve.  She has not given up on possible treatment options.  Her level of fatigue does seem to play a role in her mood and thought process. She has also worked on creating lifestyle balance/routine/structure including going to the gym, creative activities, and self-compassion.     Community support/health:  Bernadine has  worked on improving wellness behaviors and follow through with wellness goals set.    Managing Symptoms and Preventing Relapse    * Go to all of your appointments    * Take all medications as directed.      * Carry a current list if medication with you    * Do not use illicit (street) drugs.  Avoid alcohol    * Report these symptoms to your care team. These are early signs of relapse:   Thoughts of suicide   Losing more sleep   Increased confusion   Mood getting worse   Feeling more aggressive   Other:  Isolating, avoidance    *Use these skills daily:  Continue to use a planner for structure and lifestyle balance. Use DBT distress tolerance skills as needed. Set small achievable goals for yourself and practice self-compassion.     Copy of summary sent to: The record is available in Epic if needed.    Follow up with psychiatrist / main caregiver: Dr Dean    Next visit:  February 16th @ 1pm    Follow up with your therapist: Rosanne Barrera   Next visit: January 19th @ 2pm    Go to group therapy and / or support groups at: Bernadine is thinking about going back to a DBT group. She has been referred to [facility] ([facility] Call: [phone] if you do not hear from them within a week.     See your medical doctor about:  Please see your medical doctor for any other concerns that you have in the future.    Other:  Your treatment team appreciates having the opportunity to work with you and wishes you the best.      Client Signature:_______________________  Date / Time:___________    Staff Signature:________________________   Date / Time:___________

## 2018-01-12 ENCOUNTER — HOSPITAL ENCOUNTER (OUTPATIENT)
Dept: BEHAVIORAL HEALTH | Facility: CLINIC | Age: 43
End: 2018-01-12
Attending: NURSE PRACTITIONER
Payer: MEDICARE

## 2018-01-12 PROCEDURE — H2012 BEHAV HLTH DAY TREAT, PER HR: HCPCS

## 2018-01-12 NOTE — PROGRESS NOTES
"Adult Mental Health Outpatient Group Therapy Progress Note     Date: 1/12/18  Time: 9:00-9:50    Client Initial Individualized Goals for Treatment:\"Positive structure.  Concerned about backsliding.  I want to maintain where I am now because I am doing much better.  Continue to move forward.  Work towards finding a part time job- figure out what makes sense as first steps and what type of job to pursue first.\".      Treatment Goals from Individualized Treatment Plan:   1) Safety: Client will notify staff when needing assistance to develop or implement a coping plan to manage suicidal or self injurious urges.  Client will use coping plan for safety, as needed.  2) Symptom Management: Bernadine will process life stressors and identify ways to work through and problem solve current issues as they come up.  3) Life Skills: In life skills Bernadine will learn, explore, practice and apply 2-3 skills/strategies that promote mindfulness and help her establish routine and life balance specifically in area of finding employment. Report on progress weekly.   4) Wellness/D.c planning: Will improve wellness related behaviors by setting wellness goals weekly. Reporting on progress weekly      Area of Treatment Focus:  Symptom Management, Personal Safety, Community Resources/Discharge Planning, Develop / Improve Independent Living Skills and Develop Socialization / Interpersonal Relationship Skills    Therapeutic Interventions/Treatment Strategies:  Support, Redirection, Feedback, Limit/Boundaries, Safety Assessments, Structured Activity, Problem Solving, Clarification and Education    Response to Treatment Strategies:  Gave Feedback, Listened, Attentive and Alert    Name of Group:  Self support skills    Description and Outcome:   Bernadine then participated in a structured self-supPort skills group where intervention focuses on learning, developing, and practicing coping and life skills and strategies through structured experiential " psycho-education to improve function in valued roles, routines, relationships, and independent living skills.     Today the focus of the session is on weekly positive reflection and wellness. Research shows that spending time reflecting on what is good and gratitude is helpful in mental health recovery. The GLAD intervention is used in this group (something you are grateful for, learned, achieved, and delighted in). Bernadine is able to fully engage in the process today on her last day. She reports reservations about meeting new people in different groups after discharge and was validated and supported by group. She referenced willpower from a book she read and how that helped her suggest positive contributions to a separate group she attends. She made a wellness plan for the weekend which included looking for recipes that support her meal planning goals then grocery shopping. Affect even, engaged and supportive of peers. She demonstrated understanding of session content and purpose by using the opportunity to fully reflect on what is good in her life and noting the positive impact on her mood.     Treatment Planning Meetings:  12/06/17: Met with team, progressing. Set discharge date for January 9th. She plans to go to [facility], [facility], and is working on becoming a Peer Support provider.   11/03/17: Team met with renae Colindres, See Treatment plan for details.  10/03/17: Team met with Bernadine, discussed progress. See Treatment Plan for details.   9/01/17: Team members met with Bernadine, discussed program, process, progress and set treatment goals with her. See Individualized treatment goals for details.   : Absent.  8/07/17: first day in program.    Is this a Weekly Review of the Progress on the Treatment Plan?  Yes.      Are Treatment Plan Goals being addressed?  Yes, continue treatment goals      Are Treatment Plan Strategies to Address Goals Effective?  Yes, continue treatment strategies      Are there any current contracts  in place?  No

## 2018-01-15 NOTE — PROGRESS NOTES
"Adult Mental Health Outpatient Group Therapy Progress Note     Date: 1/09/18  Time: 11:00-11:50    Client Initial Individualized Goals for Treatment:\"Positive structure.  Concerned about backsliding.  I want to maintain where I am now because I am doing much better.  Continue to move forward.  Work towards finding a part time job- figure out what makes sense as first steps and what type of job to pursue first.\".      Treatment Goals from Individualized Treatment Plan:   1) Safety: Client will notify staff when needing assistance to develop or implement a coping plan to manage suicidal or self injurious urges.  Client will use coping plan for safety, as needed.  2) Symptom Management: Bernadine will process life stressors and identify ways to work through and problem solve current issues as they come up.  3) Life Skills: In life skills Bernadine will learn, explore, practice and apply 2-3 skills/strategies that promote mindfulness and help her establish routine and life balance specifically in area of finding employment. Report on progress weekly.   4) Wellness/D.c planning: Will improve wellness related behaviors by setting wellness goals weekly. Reporting on progress weekly      Area of Treatment Focus:  Symptom Management, Personal Safety, Community Resources/Discharge Planning, Develop / Improve Independent Living Skills and Develop Socialization / Interpersonal Relationship Skills    Therapeutic Interventions/Treatment Strategies:  Support, Redirection, Feedback, Limit/Boundaries, Safety Assessments, Structured Activity, Problem Solving, Clarification and Education    Response to Treatment Strategies:  Gave Feedback, Listened, Attentive and Alert    Name of Group:  Self support skills    Description and Outcome:   Bernadine then participated in a structured self-support skills group where intervention focuses on learning, developing, and practicing coping and life skills and strategies through structured experiential " psycho-education to improve function in valued roles, routines, relationships, and independent living skills.     Today the focus of education and practice is on self compassion. Group members were provided brief summary of evidence around self-compassion vs. Self-esteem. 3 components of self compassion were defined and discussed. Then writer provided structured guidance through 3 exercises for self-compassion. 1) How would you treat a friend. 2)Self-compassion break (short meditation)  3) Self compassion journal.  Bernadine is very engaged, adds her insight to discussion, provides support to her peers and asks clarifying questions throughout.  Affect even, engaged and supportive of peers. She verbalized that the content was not easy but that she liked the meditation best and will plan to start using it. She demonstrated understanding of session content and purpose by using the opportunity to engage in the three skills building structured activities and gaining another skill to help herself with self-compassion.     Treatment Planning Meetings:  12/06/17: Met with team, progressing. Set discharge date for January 9th. She plans to go to [facility], [facility], and is working on becoming a Peer Support provider.   11/03/17: Team met with renae Colindres, See Treatment plan for details.  10/03/17: Team met with Bernadine, discussed progress. See Treatment Plan for details.   9/01/17: Team members met with Bernadine, discussed program, process, progress and set treatment goals with her. See Individualized treatment goals for details.   : Absent.  8/07/17: first day in program.    Is this a Weekly Review of the Progress on the Treatment Plan?  No.

## 2018-01-17 NOTE — PROGRESS NOTES
"Adult Mental Health Outpatient Group Therapy Progress Note     Date: 1/10/18  Time: 10:00-10:50    Client Initial Individualized Goals for Treatment:\"Positive structure.  Concerned about backsliding.  I want to maintain where I am now because I am doing much better.  Continue to move forward.  Work towards finding a part time job- figure out what makes sense as first steps and what type of job to pursue first.\".      Treatment Goals from Individualized Treatment Plan:   1) Safety: Client will notify staff when needing assistance to develop or implement a coping plan to manage suicidal or self injurious urges.  Client will use coping plan for safety, as needed.  2) Symptom Management: Bernadine will process life stressors and identify ways to work through and problem solve current issues as they come up.  3) Life Skills: In life skills Bernadine will learn, explore, practice and apply 2-3 skills/strategies that promote mindfulness and help her establish routine and life balance specifically in area of finding employment. Report on progress weekly.   4) Wellness/D.c planning: Will improve wellness related behaviors by setting wellness goals weekly. Reporting on progress weekly      Area of Treatment Focus:  Symptom Management, Personal Safety, Community Resources/Discharge Planning, Develop / Improve Independent Living Skills and Develop Socialization / Interpersonal Relationship Skills    Therapeutic Interventions/Treatment Strategies:  Support, Redirection, Feedback, Limit/Boundaries, Safety Assessments, Structured Activity, Problem Solving, Clarification and Education    Response to Treatment Strategies:  Gave Feedback, Listened, Attentive and Alert    Name of Group:  Self support skills    Description and Outcome:   Bernadine then participated in a structured self-support skills group where intervention focuses on learning, developing, and practicing coping and life skills and strategies through structured experiential " psycho-education to improve function in valued roles, routines, relationships, and independent living skills.     Today the focus of education and practice is on mindful activity to improve self-compassion and self-talk. Group members reviewed benefits of mindfulness on nervous system then chose a structured task to practice and integrate self-compassion. Bernadine works steadily on a task and although struggles with one part of it she is able to independently problem solve and say something positive about her experience. Her affect is even and she is able to provide meaningful support to a peer.  She demonstrated understanding of session content and purpose by using the opportunity to engage in the structured activities and strengthening her self-compassion skills.  She endorses readiness for discharge on Friday January 12th.     Treatment Planning Meetings:  12/06/17: Met with team, progressing. Set discharge date for January 12th. She plans to go to [facility], [facility], and is working on becoming a Peer Support provider.   11/03/17: Team met with renae Colindres, See Treatment plan for details.  10/03/17: Team met with Bernadine, discussed progress. See Treatment Plan for details.   9/01/17: Team members met with Bernadine, discussed program, process, progress and set treatment goals with her. See Individualized treatment goals for details.   : Absent.  8/07/17: first day in program.    Is this a Weekly Review of the Progress on the Treatment Plan?  No.

## 2018-01-18 NOTE — ADDENDUM NOTE
Encounter addended by: Dorothy Last RN on: 1/18/2018  1:34 PM<BR>     Actions taken: Flowsheet accepted

## 2018-01-19 NOTE — ADDENDUM NOTE
Encounter addended by: Dorothy Last RN on: 1/19/2018  3:47 PM<BR>     Actions taken: Flowsheet accepted

## 2018-01-19 NOTE — ADDENDUM NOTE
Encounter addended by: Dorothy Last RN on: 1/19/2018  3:41 PM<BR>     Actions taken: Flowsheet accepted

## 2018-01-19 NOTE — ADDENDUM NOTE
Encounter addended by: Dorothy Last RN on: 1/19/2018  2:43 PM<BR>     Actions taken: Flowsheet accepted

## 2019-04-01 ENCOUNTER — HOSPITAL ENCOUNTER (OUTPATIENT)
Dept: BEHAVIORAL HEALTH | Facility: CLINIC | Age: 44
Discharge: HOME | End: 2019-04-01
Attending: PSYCHIATRY & NEUROLOGY | Admitting: PSYCHIATRY & NEUROLOGY
Payer: MEDICARE

## 2019-04-01 PROCEDURE — 90791 PSYCH DIAGNOSTIC EVALUATION: CPT | Performed by: SOCIAL WORKER

## 2019-04-01 ASSESSMENT — PAIN SCALES - GENERAL: PAINLEVEL: NO PAIN (0)

## 2019-04-01 NOTE — PROGRESS NOTES
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 passive subcardinal thinking. In 2016 she did have an overdose on ambien. She left a note for her parents and went to a hotel. After taking the ambien she texted them and they called security and EMS took her to the hospital. She was discharged to an [facility] after that but is once again living with her parents. Her goal for ADT is to once again stabilize acute symptoms of depression, and add support and structure to her life.    Prognosis is Fair. Without the recommended intervention, the client is likely to experience the following consequences of their symptoms: She remains vulnerable to further mood deterioration that may result in need for higher level of care..    Referrals to services required by statute or rule:   Report to child/adult protection services was NA.   Referral to another professional/service is not indicated at this time..    Program Recommendation: Day Treatment (DT) .      Assessment Completed by: RUTHANN Ambriz

## 2019-04-02 ENCOUNTER — TELEPHONE (OUTPATIENT)
Dept: BEHAVIORAL HEALTH | Facility: CLINIC | Age: 44
End: 2019-04-02

## 2019-04-09 ENCOUNTER — HOSPITAL ENCOUNTER (OUTPATIENT)
Dept: BEHAVIORAL HEALTH | Facility: CLINIC | Age: 44
End: 2019-04-09
Attending: PSYCHIATRY & NEUROLOGY
Payer: MEDICARE

## 2019-04-09 PROBLEM — F32.9 MAJOR DEPRESSIVE DISORDER: Status: ACTIVE | Noted: 2019-04-09

## 2019-04-09 PROCEDURE — H2012 BEHAV HLTH DAY TREAT, PER HR: HCPCS

## 2019-04-09 ASSESSMENT — ANXIETY QUESTIONNAIRES
6. BECOMING EASILY ANNOYED OR IRRITABLE: NEARLY EVERY DAY
7. FEELING AFRAID AS IF SOMETHING AWFUL MIGHT HAPPEN: NOT AT ALL
2. NOT BEING ABLE TO STOP OR CONTROL WORRYING: NEARLY EVERY DAY
1. FEELING NERVOUS, ANXIOUS, OR ON EDGE: MORE THAN HALF THE DAYS
IF YOU CHECKED OFF ANY PROBLEMS ON THIS QUESTIONNAIRE, HOW DIFFICULT HAVE THESE PROBLEMS MADE IT FOR YOU TO DO YOUR WORK, TAKE CARE OF THINGS AT HOME, OR GET ALONG WITH OTHER PEOPLE: VERY DIFFICULT
5. BEING SO RESTLESS THAT IT IS HARD TO SIT STILL: NOT AT ALL
GAD7 TOTAL SCORE: 14
3. WORRYING TOO MUCH ABOUT DIFFERENT THINGS: NEARLY EVERY DAY

## 2019-04-09 ASSESSMENT — PATIENT HEALTH QUESTIONNAIRE - PHQ9
5. POOR APPETITE OR OVEREATING: NEARLY EVERY DAY
SUM OF ALL RESPONSES TO PHQ QUESTIONS 1-9: 15

## 2019-04-10 ENCOUNTER — HOSPITAL ENCOUNTER (OUTPATIENT)
Dept: BEHAVIORAL HEALTH | Facility: CLINIC | Age: 44
End: 2019-04-10
Attending: PSYCHIATRY & NEUROLOGY
Payer: MEDICARE

## 2019-04-10 PROCEDURE — H2012 BEHAV HLTH DAY TREAT, PER HR: HCPCS

## 2019-04-10 ASSESSMENT — ANXIETY QUESTIONNAIRES: GAD7 TOTAL SCORE: 14

## 2019-04-10 NOTE — PROGRESS NOTES
"Adult Mental Health Outpatient Group Therapy Progress Note     Client Initial Individualized Goals for Treatment: \" Symptom management of depression\".        See Initial Treatment suggestions for the client during the time between Diagnostic Assessment and completion of the Master Individualized Treatment Plan.    Treatment Goals:  Follow Safety Plan   Ask for more information, support and/or assistance as needed.  Follow up with providers/community supports as needed:   Report increases or changes in symptoms to staff.  Report any personal safety concerns to staff.   Take medications as prescribed.  Report medication changes and/or side effects to staff.  Attend and participate in groups as scheduled or notify staff if unable to do so.  Report any use of substances to staff as this may impact your symptoms and/or personal safety.  Notify staff if you have any other issues that need to be addressed. This may include any current abuse / neglect / exploitation or other vulnerability.  Follow recommendations of your treatment team and discuss concerns if not in agreement.     Area of Treatment Focus:  Symptom Management    Therapeutic Interventions/Treatment Strategies:  Support, Feedback, Safety Assessments, Structured Activity and Education    Response to Treatment Strategies:  Accepted Feedback, Listened, Attentive, Accepted Support and Alert      Name of Group: Life  Skills(10:00-11:00)    Number of Group Participants: 4    Description and Outcome:  Client presented with low mood and energy level. Fair to good focus and concentration. Psychosocial skills addresse included stress avoidance,leisure education,self-esteem and practice of communication strategies. Initial treatment goals identified by client included improving motivation,using personal time more effectively and decreasing procrastination.  Client would benefit from additional opportunities to practice and implement content from this session  in every " day life,relationships and mental health recovery.    Is this a Weekly Review of the Progress on the Treatment Plan?  Yes.      Are Treatment Plan Goals being addressed?  Yes, continue treatment goals      Are Treatment Plan Strategies to Address Goals Effective?  Yes, continue treatment strategies      Are there any current contracts in place?  No

## 2019-04-17 ENCOUNTER — HOSPITAL ENCOUNTER (OUTPATIENT)
Dept: BEHAVIORAL HEALTH | Facility: CLINIC | Age: 44
End: 2019-04-17
Attending: PSYCHIATRY & NEUROLOGY
Payer: MEDICARE

## 2019-04-17 PROCEDURE — H2012 BEHAV HLTH DAY TREAT, PER HR: HCPCS

## 2019-04-17 NOTE — PROGRESS NOTES
"Adult Mental Health Outpatient Group Therapy Progress Note     Client Initial Individualized Goals for Treatment: \" Symptom management of depression\".        See Initial Treatment suggestions for the client during the time between Diagnostic Assessment and completion of the Master Individualized Treatment Plan.    Treatment Goals:  Follow Safety Plan   Ask for more information, support and/or assistance as needed.  Follow up with providers/community supports as needed:   Report increases or changes in symptoms to staff.  Report any personal safety concerns to staff.   Take medications as prescribed.  Report medication changes and/or side effects to staff.  Attend and participate in groups as scheduled or notify staff if unable to do so.  Report any use of substances to staff as this may impact your symptoms and/or personal safety.  Notify staff if you have any other issues that need to be addressed. This may include any current abuse / neglect / exploitation or other vulnerability.  Follow recommendations of your treatment team and discuss concerns if not in agreement.     Area of Treatment Focus:  Symptom Management    Therapeutic Interventions/Treatment Strategies:  Support, Feedback, Safety Assessments, Structured Activity and Education    Response to Treatment Strategies:  Accepted Feedback, Listened, Attentive, Accepted Support and Alert      Name of Group: Life  Skills(10:00-11:00)    Number of Group Participants: 6    Description and Outcome:  Client presented with irritable mood with no interaction with peers other than to ask a peer to turn down the music she was playing  Psychosocial skills addresse included stress avoidance,leisure education,self-esteem and practice of communication strategies.   Client would benefit from additional opportunities to practice and implement content from this session  in every day life,relationships and mental health recovery.    Is this a Weekly Review of the Progress on " the Treatment Plan?  Yes.      Are Treatment Plan Goals being addressed?  Yes, continue treatment goals      Are Treatment Plan Strategies to Address Goals Effective?  Yes, continue treatment strategies      Are there any current contracts in place?  No

## 2019-04-23 ENCOUNTER — HOSPITAL ENCOUNTER (OUTPATIENT)
Dept: BEHAVIORAL HEALTH | Facility: CLINIC | Age: 44
End: 2019-04-23
Attending: PSYCHIATRY & NEUROLOGY
Payer: MEDICARE

## 2019-04-23 PROCEDURE — H2012 BEHAV HLTH DAY TREAT, PER HR: HCPCS

## 2019-04-24 ENCOUNTER — HOSPITAL ENCOUNTER (OUTPATIENT)
Dept: BEHAVIORAL HEALTH | Facility: CLINIC | Age: 44
End: 2019-04-24
Attending: PSYCHIATRY & NEUROLOGY
Payer: MEDICARE

## 2019-04-24 PROCEDURE — H2012 BEHAV HLTH DAY TREAT, PER HR: HCPCS

## 2019-04-25 NOTE — PROGRESS NOTES
"Adult Mental Health Outpatient Group Therapy Progress Note     Client Initial Individualized Goals for Treatment: \" Symptom management of depression\".        See Initial Treatment suggestions for the client during the time between Diagnostic Assessment and completion of the Master Individualized Treatment Plan.    Treatment Goals:  1. Client will notify staff when needing assistance to develop or implement a coping plan to manage suicidal or self injurious urges.Client will use coping plan for safety, as needed.  2. When in life skills group Naomi will practice strategies to help improve motivation so as to get done what she needs to have a more satisfying life routine and providing an update of progress weekly.  3. In group Naomi  will learn, practice, generalize 2 sensory modulation or sensorimotor mindfulness based self regulation skills for improved ability to stay in the present moment and distress tolerance.  4. Bernadine will report on symptoms and identify skills to use to manage depression.  5. Naomi will improve wellness related behaviors by getting enough sleep,exercise and balanced nutrition  6. Client will establish an aftercare plan to include medical providers and social supports by discharge.       Area of Treatment Focus:  Symptom Management    Therapeutic Interventions/Treatment Strategies:  Support, Feedback, Safety Assessments, Structured Activity and Education    Response to Treatment Strategies:  Accepted Feedback, Listened, Attentive, Accepted Support and Alert      Name of Group: Life  Skills(10:00-11:00)    Number of Group Participants: 6    Description and Outcome:  Client presented with irritable mood with no interaction with peers other than to ask a peer to turn down the music she was playing  Psychosocial skills addresse included stress avoidance,leisure education,self-esteem and practice of communication strategies.  Goal #1 (no personal safety concerns reported or observed).  Client " would benefit from additional opportunities to practice and implement content from this session  in every day life,relationships and mental health recovery.    Is this a Weekly Review of the Progress on the Treatment Plan?  Yes.      Are Treatment Plan Goals being addressed?  Yes, continue treatment goals      Are Treatment Plan Strategies to Address Goals Effective?  Yes, continue treatment strategies      Are there any current contracts in place?  No

## 2019-04-29 NOTE — PROGRESS NOTES
"Adult Mental Health Outpatient Group Therapy Progress Note     Date: 12/13/17  Time: 10:00-10:50    Client Initial Individualized Goals for Treatment:\"Positive structure.  Concerned about backsliding.  I want to maintain where I am now because I am doing much better.  Continue to move forward.  Work towards finding a part time job- figure out what makes sense as first steps and what type of job to pursue first.\".      Treatment Goals from Individualized Treatment Plan:   1) Safety: Client will notify staff when needing assistance to develop or implement a coping plan to manage suicidal or self injurious urges.  Client will use coping plan for safety, as needed.  2) Symptom Management: Bernadine will process life stressors and identify ways to work through and problem solve current issues as they come up.  3) Life Skills: In life skills Bernadine will learn, explore, practice and apply 2-3 skills/strategies that promote mindfulness and help her establish routine and life balance specifically in area of finding employment. Report on progress weekly.   4) Wellness/D.c planning: Will improve wellness related behaviors by setting wellness goals weekly. Reporting on progress weekly      Area of Treatment Focus:  Symptom Management, Personal Safety, Community Resources/Discharge Planning, Develop / Improve Independent Living Skills and Develop Socialization / Interpersonal Relationship Skills    Therapeutic Interventions/Treatment Strategies:  Support, Redirection, Feedback, Limit/Boundaries, Safety Assessments, Structured Activity, Problem Solving, Clarification and Education    Response to Treatment Strategies:  Gave Feedback, Listened, Attentive and Alert    Name of Group:  Self support skills    Description and Outcome:   Bernadine arrives a little late today due to her family therapy session. She is dysregulated and was provided validation and support. She then participated in a structured self-support skills group where " Call to pt.  States took awhile to get approval for tetracycline.  Therefore, did not begin tx for H pylori until about 3 1/2 days ago.  Feeling nauseated all the time - today feels like could throw up.      Asking if this something she just needs to gut out until complete, or is there any other way to manage nausea.    Update/question to Dr Conteh.   intervention focuses on learning, developing, and practicing coping and life skills and strategies through structured experiential psycho-education to improve function in valued roles, routines, relationships, and independent living skills.     Today the focus of the session is on practicing sensory based self-regulation skills for improved awareness and understanding of the calming impact they can have on them. Bernadine is familiar with the techniques and  experimented with a couple of options: visual and tactile input. She then chose to engage in a tactile structured activity for continued calming. She endorsed feeling calmed down compared to when she first came in to group. Bernadine would benefit from additional opportunities to learn, practice and integrate content from this session into her daily life to help her self-regulate.    Treatment Planning Meetings:  12/06/17: Met with team, progressing. Set discharge date for January 9th.  11/03/17: Team met with renae Colindres, See Treatment plan for details.  10/03/17: Team met with Bernadine, discussed progress. See Treatment Plan for details.   9/01/17: Team members met with Bernadine, discussed program, process, progress and set treatment goals with her. See Individualized treatment goals for details.   : Absent.  8/07/17: first day in program.    Is this a Weekly Review of the Progress on the Treatment Plan?  No.

## 2019-05-01 ENCOUNTER — HOSPITAL ENCOUNTER (OUTPATIENT)
Dept: BEHAVIORAL HEALTH | Facility: CLINIC | Age: 44
End: 2019-05-01
Attending: PSYCHIATRY & NEUROLOGY
Payer: MEDICARE

## 2019-05-01 PROCEDURE — H2012 BEHAV HLTH DAY TREAT, PER HR: HCPCS

## 2019-05-01 NOTE — PROGRESS NOTES
"Adult Mental Health Outpatient Group Therapy Progress Note     Client Initial Individualized Goals for Treatment: \" Symptom management of depression\".     See Initial Treatment suggestions for the client during the time between Diagnostic Assessment and completion of the Master Individualized Treatment Plan.     Treatment Goals:  1. Client will notify staff when needing assistance to develop or implement a coping plan to manage suicidal or self injurious urges.Client will use coping plan for safety, as needed.  2. When in life skills group Naomi will practice strategies to help improve motivation so as to get done what she needs to have a more satisfying life routine and providing an update of progress weekly.  3. In group Naomi  will learn, practice, generalize 2 sensory modulation or sensorimotor mindfulness based self regulation skills for improved ability to stay in the present moment and distress tolerance.  4. Bernadine will report on symptoms and identify skills to use to manage depression.  5. Naomi will improve wellness related behaviors by getting enough sleep,exercise and balanced nutrition  6. Client will establish an aftercare plan to include medical providers and social supports by discharge.       Area of Treatment Focus:  Symptom Management, Personal Safety and Community Resources/Discharge Planning    Therapeutic Interventions/Treatment Strategies:  Support, Feedback, Safety Assessments, Problem Solving, Clarification, Education and Cognitive Behavioral Therapy    Response to Treatment Strategies:  Accepted Feedback, Listened, Alert and At times the client can be help rejecting     Name of Group: Psychotherapy Date/Time: 5/01/19 / 9:00 to 9:50 am    Number of Group Participants: 4     Description and Outcome:  The client has been in group for a few weeks now.  In her reports there is not a lot of change in mood or thought for her.  She does receive lots of positive credit by others for her attendance " "even when she is hopeless.  She can also be help rejecting too which at times seems to keep her stuck in a difficult place.  Today she reported daily suicidal ideation but she stated it is not an active type.  She did state, \"If I had more money, I would take an awesome vacation and then kill myself\".  Right after saying that she stated it would not make sense because she does not have enough money to take the trip that she really wants to take.  She does continue to believe that her disrupted childhood has ruined her potential of happiness in adulthood.    Client demonstrated understanding of session content by taking a turn and doing so appropriately.    Client would benefit from additional opportunities to practice and implement content from this session.  She would also benefit from working with her thought pattern.    Is this a Weekly Review of the Progress on the Treatment Plan?  Yes.      Are Treatment Plan Goals being addressed?  Yes, continue treatment goals      Are Treatment Plan Strategies to Address Goals Effective?  Yes, continue treatment strategies      Are there any current contracts in place?  No        "

## 2019-05-02 NOTE — PROGRESS NOTES
"Adult Mental Health Outpatient Group Therapy Progress Note     Client Initial Individualized Goals for Treatment: \" Symptom management of depression\".        See Initial Treatment suggestions for the client during the time between Diagnostic Assessment and completion of the Master Individualized Treatment Plan.    Treatment Goals:  1. Client will notify staff when needing assistance to develop or implement a coping plan to manage suicidal or self injurious urges.Client will use coping plan for safety, as needed.  2. When in life skills group Naomi will practice strategies to help improve motivation so as to get done what she needs to have a more satisfying life routine and providing an update of progress weekly.  3. In group Naomi  will learn, practice, generalize 2 sensory modulation or sensorimotor mindfulness based self regulation skills for improved ability to stay in the present moment and distress tolerance.  4. Bernadine will report on symptoms and identify skills to use to manage depression.  5. Naomi will improve wellness related behaviors by getting enough sleep,exercise and balanced nutrition  6. Client will establish an aftercare plan to include medical providers and social supports by discharge.       Area of Treatment Focus:  Symptom Management    Therapeutic Interventions/Treatment Strategies:  Support, Feedback, Safety Assessments, Structured Activity and Education    Response to Treatment Strategies:  Accepted Feedback, Listened, Attentive, Accepted Support and Alert      Name of Group: Life  Skills(10:00-11:00)    Number of Group Participants: 4    Description and Outcome:  Client presented as mildly irritable but alert with good focus and concentration. Psychosocial skills addressed included stress avoidance,leisure education,self-esteem and practice of communication strategies. Therapist suggested involvement with [employer] to help get employment as an  " which she was educated to do.  Goal #1 (no personal safety concerns reported or observed).  Client would benefit from additional opportunities to practice and implement content from this session  in every day life,relationships and mental health recovery.    Is this a Weekly Review of the Progress on the Treatment Plan?  Yes.      Are Treatment Plan Goals being addressed?  Yes, continue treatment goals      Are Treatment Plan Strategies to Address Goals Effective?  Yes, continue treatment strategies      Are there any current contracts in place?  No

## 2019-05-03 ENCOUNTER — HOSPITAL ENCOUNTER (OUTPATIENT)
Dept: BEHAVIORAL HEALTH | Facility: CLINIC | Age: 44
End: 2019-05-03
Attending: PSYCHIATRY & NEUROLOGY
Payer: MEDICARE

## 2019-05-03 PROCEDURE — H2012 BEHAV HLTH DAY TREAT, PER HR: HCPCS

## 2019-05-03 NOTE — PROGRESS NOTES
"  Adult Mental Health Outpatient Group Therapy Progress Note     Client Initial Individualized Goals for Treatment: \" Symptom management of depression\".     See Initial Treatment suggestions for the client during the time between Diagnostic Assessment and completion of the Master Individualized Treatment Plan.     Treatment Goals:  1. Client will notify staff when needing assistance to develop or implement a coping plan to manage suicidal or self injurious urges.Client will use coping plan for safety, as needed.  2. When in life skills group Naomi will practice strategies to help improve motivation so as to get done what she needs to have a more satisfying life routine and providing an update of progress weekly.  3. In group Naomi  will learn, practice, generalize 2 sensory modulation or sensorimotor mindfulness based self regulation skills for improved ability to stay in the present moment and distress tolerance.  4. Bernadine will report on symptoms and identify skills to use to manage depression.  5. Naomi will improve wellness related behaviors by getting enough sleep,exercise and balanced nutrition  6. Client will establish an aftercare plan to include medical providers and social supports by discharge.       Area of Treatment Focus:  Symptom Management    Therapeutic Interventions/Treatment Strategies:  Support, Feedback, Safety Assessments and Clarification    Response to Treatment Strategies:  Accepted Feedback, Gave Feedback, Listened, Focused on Goals, Attentive, Accepted Support and Alert    Name of Group:  Psychotherapy [id number], 6 participants     Description and Outcome:  Bernadine did not choose to share in group today. She does not know this therapist. She stated she is managing her suicidal thoughts. She is looking into [facility]. She cried throughout some of group but did not want to talk. She also did not want to talk with this writer individually following group.     Is this a Weekly Review of the " Progress on the Treatment Plan?  No

## 2019-05-03 NOTE — PROGRESS NOTES
"  Adult Mental Health Outpatient Group Therapy Progress Note     Client Initial Individualized Goals for Treatment: \" Symptom management of depression\".     See Initial Treatment suggestions for the client during the time between Diagnostic Assessment and completion of the Master Individualized Treatment Plan.     Treatment Goals:  1. Client will notify staff when needing assistance to develop or implement a coping plan to manage suicidal or self injurious urges.Client will use coping plan for safety, as needed.  2. When in life skills group Naomi will practice strategies to help improve motivation so as to get done what she needs to have a more satisfying life routine and providing an update of progress weekly.  3. In group Naomi  will learn, practice, generalize 2 sensory modulation or sensorimotor mindfulness based self regulation skills for improved ability to stay in the present moment and distress tolerance.  4. Bernadine will report on symptoms and identify skills to use to manage depression.  5. Naomi will improve wellness related behaviors by getting enough sleep,exercise and balanced nutrition  6. Client will establish an aftercare plan to include medical providers and social supports by discharge.       Area of Treatment Focus:  Symptom Management    Therapeutic Interventions/Treatment Strategies:  Support, Feedback, Safety Assessments and Clarification    Response to Treatment Strategies:  Accepted Feedback, Gave Feedback, Listened, Focused on Goals, Attentive, Accepted Support and Alert    Name of Group:  Mental health management [id number], 7 participants     Description and Outcome:  The group was given a structured journaling worksheet with the focus on feeling identification, expression and containment.  Information on the purpose and benefits of structured journaling was discussed.  Group members were offered the opportunity to share part, all, or none of their journaling and were encouraged to comment " on process. Bernadine demonstrated understanding of topic by completing structured worksheet.    Is this a Weekly Review of the Progress on the Treatment Plan?  No

## 2019-05-03 NOTE — PROGRESS NOTES
"Adult Mental Health Outpatient Group Therapy Progress Note     Client Initial Individualized Goals for Treatment: \" Symptom management of depression\".        See Initial Treatment suggestions for the client during the time between Diagnostic Assessment and completion of the Master Individualized Treatment Plan.    Treatment Goals:  1. Client will notify staff when needing assistance to develop or implement a coping plan to manage suicidal or self injurious urges.Client will use coping plan for safety, as needed.  2. When in life skills group Naomi will practice strategies to help improve motivation so as to get done what she needs to have a more satisfying life routine and providing an update of progress weekly.  3. In group Naomi  will learn, practice, generalize 2 sensory modulation or sensorimotor mindfulness based self regulation skills for improved ability to stay in the present moment and distress tolerance.  4. Bernadine will report on symptoms and identify skills to use to manage depression.  5. Naomi will improve wellness related behaviors by getting enough sleep,exercise and balanced nutrition  6. Client will establish an aftercare plan to include medical providers and social supports by discharge.       Area of Treatment Focus:  Symptom Management    Therapeutic Interventions/Treatment Strategies:  Support, Feedback, Safety Assessments, Structured Activity and Education    Response to Treatment Strategies:  Accepted Feedback, Listened, Attentive, Accepted Support and Alert      Name of Group: Life  Skills(11:00-12:00)    Number of Group Participants: 6    Description and Outcome:  Client presented as irritable with fair focus and concentration. Psychosocial skills addressed included attention deployment stategies,leisure education,self-esteem and practice of communication strategies.Gave client a copy of her treatment plan which she did not want to keep. No social interaction with her peers observed. Goal " #1 (no personal safety concerns reported or observed).  Client would benefit from additional opportunities to practice and implement content from this session  in every day life,relationships and mental health recovery.    Is this a Weekly Review of the Progress on the Treatment Plan?  Yes.      Are Treatment Plan Goals being addressed?  Yes, continue treatment goals      Are Treatment Plan Strategies to Address Goals Effective?  Yes, continue treatment strategies      Are there any current contracts in place?  No

## 2019-05-07 ENCOUNTER — HOSPITAL ENCOUNTER (OUTPATIENT)
Dept: BEHAVIORAL HEALTH | Facility: CLINIC | Age: 44
End: 2019-05-07
Attending: PSYCHIATRY & NEUROLOGY
Payer: MEDICARE

## 2019-05-07 PROCEDURE — H2012 BEHAV HLTH DAY TREAT, PER HR: HCPCS

## 2019-05-14 ENCOUNTER — HOSPITAL ENCOUNTER (OUTPATIENT)
Dept: BEHAVIORAL HEALTH | Facility: CLINIC | Age: 44
End: 2019-05-14
Attending: PSYCHIATRY & NEUROLOGY
Payer: MEDICARE

## 2019-05-14 PROCEDURE — H2012 BEHAV HLTH DAY TREAT, PER HR: HCPCS

## 2019-05-15 ENCOUNTER — HOSPITAL ENCOUNTER (OUTPATIENT)
Dept: BEHAVIORAL HEALTH | Facility: CLINIC | Age: 44
End: 2019-05-15
Attending: PSYCHIATRY & NEUROLOGY
Payer: MEDICARE

## 2019-05-15 PROCEDURE — H2012 BEHAV HLTH DAY TREAT, PER HR: HCPCS

## 2019-05-15 NOTE — PROGRESS NOTES
"Adult Mental Health Outpatient Group Therapy Progress Note     Client Initial Individualized Goals for Treatment: \" Symptom management of depression\".     See Initial Treatment suggestions for the client during the time between Diagnostic Assessment and completion of the Master Individualized Treatment Plan.     Treatment Goals:  1. Client will notify staff when needing assistance to develop or implement a coping plan to manage suicidal or self injurious urges.Client will use coping plan for safety, as needed.  2. When in life skills group Naomi will practice strategies to help improve motivation so as to get done what she needs to have a more satisfying life routine and providing an update of progress weekly.  3. In group Naomi  will learn, practice, generalize 2 sensory modulation or sensorimotor mindfulness based self regulation skills for improved ability to stay in the present moment and distress tolerance.  4. Bernadine will report on symptoms and identify skills to use to manage depression.  5. Naomi will improve wellness related behaviors by getting enough sleep,exercise and balanced nutrition  6. Client will establish an aftercare plan to include medical providers and social supports by discharge.       Area of Treatment Focus:  Symptom Management, Personal Safety and Community Resources/Discharge Planning    Therapeutic Interventions/Treatment Strategies:  Support, Feedback, Safety Assessments, Problem Solving, Clarification, Education and Cognitive Behavioral Therapy    Response to Treatment Strategies:  Accepted Feedback, Listened, Alert and At times the client can be help rejecting     Name of Group: Psychotherapy 1  Date/Time: 5/15/19 / 9:00 to 9:50 am    Number of Group Participants: 5     Description and Outcome:  The client has been in group for several weeks now. In the last several weeks she has had days where she has been very tearful.  She has taken time to meet with staff on an individual basis " to discuss her current status.  She reports just terrible depression with no hope for a change.  She does receive lots of positive credit by others for her attendance even when she is feeing hopeless.  She can also be help rejecting too which at times seems to keep her stuck in a difficult place.  This last weekend went better than she expected even though it was mother's day and her relationship with her mother is so strained.  Just she and her mother celebrated the day together.  It went better because her step father was not with them.  She reports that her mood has been just a bit better in the last few days. She is still trying to recover from what she believes is a sinus infection.  Over the weekend she did get some anit-biotics to help with the infection but she has had a horrible reaction to the anti-biotics.  She is trying to get the doctor to change the prescription.  This week thus far her mood seems slightly better.  She agrees with this assessment too.  In group today many of the members were emotionally dysregulated but she was not.  She was observed to be listening but not having strong emotions to any of the conversations.  She did struggle with coming up with any personal values when she was asked about the activity from yesterday. Today she denied any active suicidal ideation.      Client demonstrated understanding of session content by taking a turn and doing so appropriately.    Client would benefit from additional opportunities to practice and implement content from this session.  She would also benefit from working with her thought pattern.    Is this a Weekly Review of the Progress on the Treatment Plan?  No.      Are Treatment Plan Goals being addressed?  Yes, continue treatment goals      Are Treatment Plan Strategies to Address Goals Effective?  Yes, continue treatment strategies      Are there any current contracts in place?  No

## 2019-05-15 NOTE — PROGRESS NOTES
"Adult Mental Health Outpatient Group Therapy Progress Note     Client Initial Individualized Goals for Treatment: \" Symptom management of depression\".        See Initial Treatment suggestions for the client during the time between Diagnostic Assessment and completion of the Master Individualized Treatment Plan.    Treatment Goals:  1. Client will notify staff when needing assistance to develop or implement a coping plan to manage suicidal or self injurious urges.Client will use coping plan for safety, as needed.  2. When in life skills group Naomi will practice strategies to help improve motivation so as to get done what she needs to have a more satisfying life routine and providing an update of progress weekly.  3. In group Naomi  will learn, practice, generalize 2 sensory modulation or sensorimotor mindfulness based self regulation skills for improved ability to stay in the present moment and distress tolerance.  4. Bernadine will report on symptoms and identify skills to use to manage depression.  5. Naomi will improve wellness related behaviors by getting enough sleep,exercise and balanced nutrition  6. Client will establish an aftercare plan to include medical providers and social supports by discharge.       Area of Treatment Focus:  Symptom Management    Therapeutic Interventions/Treatment Strategies:  Support, Feedback, Safety Assessments, Structured Activity and Education    Response to Treatment Strategies:  Accepted Feedback, Listened, Attentive, Accepted Support and Alert      Name of Group: Life  Skills(10:00-11:00)    Number of Group Participants: 5    Description and Outcome:  Client presented as calm and alert with good focus and concentration. Psychosocial skills addressed included a discussion related to self regulation strategies. Goal #1 (no personal safety concerns reported or observed).Goal #2 Client reports\"slight improvement\" with motivation. \"A little progress in regards to using her personal " "time more effectively. \"No progress\" with procrastination.  Client would benefit from additional opportunities to practice and implement content from this session  in every day life,relationships and mental health recovery.    Is this a Weekly Review of the Progress on the Treatment Plan?  Yes.      Are Treatment Plan Goals being addressed?  Yes, continue treatment goals      Are Treatment Plan Strategies to Address Goals Effective?  Yes, continue treatment strategies      Are there any current contracts in place?  No  "

## 2019-05-15 NOTE — PROGRESS NOTES
"Adult Mental Health Outpatient Group Therapy Progress Note     Client Initial Individualized Goals for Treatment: \" Symptom management of depression\".     See Initial Treatment suggestions for the client during the time between Diagnostic Assessment and completion of the Master Individualized Treatment Plan.     Treatment Goals:  1. Client will notify staff when needing assistance to develop or implement a coping plan to manage suicidal or self injurious urges.Client will use coping plan for safety, as needed.  2. When in life skills group Naomi will practice strategies to help improve motivation so as to get done what she needs to have a more satisfying life routine and providing an update of progress weekly.  3. In group Naomi  will learn, practice, generalize 2 sensory modulation or sensorimotor mindfulness based self regulation skills for improved ability to stay in the present moment and distress tolerance.  4. Bernadine will report on symptoms and identify skills to use to manage depression.  5. Naomi will improve wellness related behaviors by getting enough sleep,exercise and balanced nutrition  6. Client will establish an aftercare plan to include medical providers and social supports by discharge.       Area of Treatment Focus:  Symptom Management, Personal Safety and Community Resources/Discharge Planning    Therapeutic Interventions/Treatment Strategies:  Support, Feedback, Safety Assessments, Problem Solving, Clarification, Education and Cognitive Behavioral Therapy    Response to Treatment Strategies:  Accepted Feedback, Listened, Alert and At times the client can be help rejecting     Name of Group: Psychotherapy 1 and 2 Date/Time: 5/14/19 / 9:00 to 10:50 am    Number of Group Participants: 6     Description and Outcome:  The client has been in group for several weeks now. In the last several weeks she has had days where she has been very tearful.  She has taken time to meet with staff on an individual " basis to discuss her current status.  She reports just terrible depression with no hope for a change.  She does receive lots of positive credit by others for her attendance even when she is feeing hopeless.  She can also be help rejecting too which at times seems to keep her stuck in a difficult place.  This last weekend went better than she expected even though it was mother's day and her relationship with her mother is so strained.  Just she and her mother celebrated the day together.  It went better because her step father was not with them.  She reports that her mood has been just a bit better in the last few days. Today she denied any active suicidal ideation.    Psychotherapy 2  In the second group today the clients were asked to focus on what some of their personal values look like.  This was hard for the clients in the this group but they made an effort.  They were also asked to think of at least three ways that their values could be demonstrated.  The clients were asked to take this activity home as homework if they could not finish in the time provided.    Client demonstrated understanding of session content by taking a turn and doing so appropriately.    Client would benefit from additional opportunities to practice and implement content from this session.  She would also benefit from working with her thought pattern.    Is this a Weekly Review of the Progress on the Treatment Plan?  Yes.      Are Treatment Plan Goals being addressed?  Yes, continue treatment goals      Are Treatment Plan Strategies to Address Goals Effective?  Yes, continue treatment strategies      Are there any current contracts in place?  No
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
## 2019-05-17 ENCOUNTER — HOSPITAL ENCOUNTER (OUTPATIENT)
Dept: BEHAVIORAL HEALTH | Facility: CLINIC | Age: 44
End: 2019-05-17
Attending: PSYCHIATRY & NEUROLOGY
Payer: MEDICARE

## 2019-05-17 PROCEDURE — H2012 BEHAV HLTH DAY TREAT, PER HR: HCPCS

## 2019-05-17 NOTE — PROGRESS NOTES
"  Adult Mental Health Outpatient Group Therapy Progress Note        Client Initial Individualized Goals for Treatment: \" Symptom management of depression\".     See Initial Treatment suggestions for the client during the time between Diagnostic Assessment and completion of the Master Individualized Treatment Plan.     Treatment Goals:  1. Client will notify staff when needing assistance to develop or implement a coping plan to manage suicidal or self injurious urges.Client will use coping plan for safety, as needed.  2. When in life skills group Naomi will practice strategies to help improve motivation so as to get done what she needs to have a more satisfying life routine and providing an update of progress weekly.  3. In group Naomi  will learn, practice, generalize 2 sensory modulation or sensorimotor mindfulness based self regulation skills for improved ability to stay in the present moment and distress tolerance.  4. Bernadine will report on symptoms and identify skills to use to manage depression.  5. Naomi will improve wellness related behaviors by getting enough sleep,exercise and balanced nutrition  6. Client will establish an aftercare plan to include medical providers and social supports by discharge.     Area of Treatment Focus:  Symptom Management, Personal Safety and Community Resources/Discharge Planning    Therapeutic Interventions/Treatment Strategies:  Support, Redirection, Feedback, Limit/Boundaries, Structured Activity and Clarification    Response to Treatment Strategies:  Accepted Feedback, Gave Feedback, Listened, Focused on Goals, Attentive, Accepted Support and Alert    Name of Group:  Psychotherapy 2, 11:00am; 6 participants     Description and Outcome:  Bernadine was able to participate in the group on \"Identifying personal strengths\" and applying them to relationships.  Bernadine reports that she struggles with applying her personal strengths to situations with others as she feels so debilitated by " "her depressive symptoms.  The group encouraged Bernadine to use her skills of acceptance and \"checking the facts\" to challenge some of the cognitive distortions that were impeding her ability to identify her strengths.  Brenadine was receptive to this feedback.    Is this a Weekly Review of the Progress on the Treatment Plan?  No        "

## 2019-05-20 NOTE — PROGRESS NOTES
"Adult Mental Health Outpatient Group Therapy Progress Note     Client Initial Individualized Goals for Treatment: \" Symptom management of depression\".     See Initial Treatment suggestions for the client during the time between Diagnostic Assessment and completion of the Master Individualized Treatment Plan.     Treatment Goals:  1. Client will notify staff when needing assistance to develop or implement a coping plan to manage suicidal or self injurious urges.Client will use coping plan for safety, as needed.  2. When in life skills group Naomi will practice strategies to help improve motivation so as to get done what she needs to have a more satisfying life routine and providing an update of progress weekly.  3. In group Naomi  will learn, practice, generalize 2 sensory modulation or sensorimotor mindfulness based self regulation skills for improved ability to stay in the present moment and distress tolerance.  4. Bernadine will report on symptoms and identify skills to use to manage depression.  5. Naomi will improve wellness related behaviors by getting enough sleep,exercise and balanced nutrition  6. Client will establish an aftercare plan to include medical providers and social supports by discharge.       Area of Treatment Focus:  Symptom Management, Personal Safety and Community Resources/Discharge Planning    Therapeutic Interventions/Treatment Strategies:  Support, Feedback, Safety Assessments, Problem Solving, Clarification, Education and Cognitive Behavioral Therapy    Response to Treatment Strategies:  Accepted Feedback, Listened, Alert and At times the client can be help rejecting     Name of Group: Psychotherapy 1  Date/Time: 5/17/19 / 10:00 to 10:50 am    Number of Group Participants: 6    Description and Outcome:  The client has been in group for several weeks now. In the last several weeks she has had days where she has been very tearful.  She has taken time to meet with staff on an individual basis " to discuss her current status.  She reports just terrible depression with no hope for a change.  She does receive lots of positive credit by others for her attendance even when she is feeing hopeless.  She can also be help rejecting too which at times seems to keep her stuck in a difficult place. This week thus far her mood seems slightly better.  She confirms that her mood is just slightly better.  In group today many of the members were emotionally dysregulated but she was not.  She was observed to be listening but not having strong emotions to any of the conversations.  When the group was confronted with not taking personal responsibility for using any of their DBT skills nor communicating effectively she acknowledged that this was true for her.  She told this therapist that she has not been using any of her past DBT skills.  She is not sure if they are helpful, but if she is not trying them how could they be helpful.  Her suicidal ideation remains passive in nature and there is a slight improvement in mood.    Client demonstrated understanding of session content by taking a turn and doing so appropriately.    Client would benefit from additional opportunities to practice and implement content from this session.  She would also benefit from working with her thought pattern.    Is this a Weekly Review of the Progress on the Treatment Plan?  No.      Are Treatment Plan Goals being addressed?  Yes, continue treatment goals      Are Treatment Plan Strategies to Address Goals Effective?  Yes, continue treatment strategies      Are there any current contracts in place?  No

## 2019-05-21 ENCOUNTER — HOSPITAL ENCOUNTER (OUTPATIENT)
Dept: BEHAVIORAL HEALTH | Facility: CLINIC | Age: 44
End: 2019-05-21
Attending: PSYCHIATRY & NEUROLOGY
Payer: MEDICARE

## 2019-05-21 PROCEDURE — H2012 BEHAV HLTH DAY TREAT, PER HR: HCPCS

## 2019-05-22 ENCOUNTER — HOSPITAL ENCOUNTER (OUTPATIENT)
Dept: BEHAVIORAL HEALTH | Facility: CLINIC | Age: 44
End: 2019-05-22
Attending: PSYCHIATRY & NEUROLOGY
Payer: MEDICARE

## 2019-05-22 PROCEDURE — H2012 BEHAV HLTH DAY TREAT, PER HR: HCPCS
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
## 2019-05-22 NOTE — PROGRESS NOTES
"Adult Mental Health Outpatient Group Therapy Progress Note     Client Initial Individualized Goals for Treatment: \" Symptom management of depression\".     See Initial Treatment suggestions for the client during the time between Diagnostic Assessment and completion of the Master Individualized Treatment Plan.     Treatment Goals:  1. Client will notify staff when needing assistance to develop or implement a coping plan to manage suicidal or self injurious urges.Client will use coping plan for safety, as needed.  2. When in life skills group Naomi will practice strategies to help improve motivation so as to get done what she needs to have a more satisfying life routine and providing an update of progress weekly.  3. In group Naomi  will learn, practice, generalize 2 sensory modulation or sensorimotor mindfulness based self regulation skills for improved ability to stay in the present moment and distress tolerance.  4. Bernadine will report on symptoms and identify skills to use to manage depression.  5. Naomi will improve wellness related behaviors by getting enough sleep,exercise and balanced nutrition  6. Client will establish an aftercare plan to include medical providers and social supports by discharge.       Area of Treatment Focus:  Symptom Management, Personal Safety and Community Resources/Discharge Planning    Therapeutic Interventions/Treatment Strategies:  Support, Feedback, Safety Assessments, Problem Solving, Clarification, Education and Cognitive Behavioral Therapy    Response to Treatment Strategies:  Accepted Feedback, Listened, Alert     Name of Group: Psychotherapy Group 1 on 5/22/19 at 9:00-9:50     Number of Group Participants:  5     Description and Outcome:  Client presented today with a calm presentation.  The group was smaller in numbers today and one client who can be loud was not present.  Today the group spent most of it's time discussing the dynamics of emotions.  Each client was asked to " participate in the conversation by sharing examples from their own histories.  She did a good job with this subject and the discussion.  She is keenly aware of how her emotions direct her much of the time.  She reports daily suicidal ideation, it is passive.  She has stayed safe during her time in the program.      Client demonstrated understanding of session content by participating verbally and then contributing in a appropriate way.     Client would benefit from practicing her new coping skills.     Is this a Weekly Review of the Progress on the Treatment Plan?  No.      Are Treatment Plan Goals being addressed?  Yes, continue treatment goals      Are Treatment Plan Strategies to Address Goals Effective?  Yes, continue treatment strategies      Are there any current contracts in place?  No

## 2019-05-22 NOTE — PROGRESS NOTES
"Adult Mental Health Outpatient Group Therapy Progress Note     Client Initial Individualized Goals for Treatment: \" Symptom management of depression\".        See Initial Treatment suggestions for the client during the time between Diagnostic Assessment and completion of the Master Individualized Treatment Plan.    Treatment Goals:  1. Client will notify staff when needing assistance to develop or implement a coping plan to manage suicidal or self injurious urges.Client will use coping plan for safety, as needed.  2. When in life skills group Naomi will practice strategies to help improve motivation so as to get done what she needs to have a more satisfying life routine and providing an update of progress weekly.  3. In group Naomi  will learn, practice, generalize 2 sensory modulation or sensorimotor mindfulness based self regulation skills for improved ability to stay in the present moment and distress tolerance.  4. Bernadine will report on symptoms and identify skills to use to manage depression.  5. Naomi will improve wellness related behaviors by getting enough sleep,exercise and balanced nutrition  6. Client will establish an aftercare plan to include medical providers and social supports by discharge.       Area of Treatment Focus:  Symptom Management    Therapeutic Interventions/Treatment Strategies:  Support, Feedback, Safety Assessments, Structured Activity and Education    Response to Treatment Strategies:  Accepted Feedback, Listened, Attentive, Accepted Support and Alert      Name of Group: Life  Skills(10:00-11:00)    Number of Group Participants: 5    Description and Outcome:  Client presented as calm and alert with good focus and concentration. Psychosocial skills addressed included a discussion related to healthy routines ,habits and roles. Goal #1 (no personal safety concerns reported or observed).  Client would benefit from additional opportunities to practice and implement content from this " session  in every day life,relationships and mental health recovery.    Is this a Weekly Review of the Progress on the Treatment Plan?  Yes.      Are Treatment Plan Goals being addressed?  Yes, continue treatment goals      Are Treatment Plan Strategies to Address Goals Effective?  Yes, continue treatment strategies      Are there any current contracts in place?  No

## 2019-05-23 NOTE — PROGRESS NOTES
Psychiatry staffing: case discussed  Diagnosis: MDD. Borderline PD. Doing better.     Past Medical History:   Diagnosis Date     Depression     In counseling; has Psychiatrist     Depressive disorder      Current Outpatient Medications   Medication     BuPROPion HCl (WELLBUTRIN PO)     etonogestrel-ethinyl estradiol (NUVARING) 0.12-0.015 MG/24HR vaginal ring     lamoTRIgine (LAMICTAL) 150 MG tablet     levothyroxine (SYNTHROID/LEVOTHROID) 50 MCG tablet     norethindrone-ethinyl estradiol (MICROGESTIN 1.5/30) 1.5-30 MG-MCG per tablet     TRAZODONE HCL PO     Vilazodone HCl (VIIBRYD PO)     No current facility-administered medications for this encounter.

## 2019-05-24 ENCOUNTER — HOSPITAL ENCOUNTER (OUTPATIENT)
Dept: BEHAVIORAL HEALTH | Facility: CLINIC | Age: 44
End: 2019-05-24
Attending: PSYCHIATRY & NEUROLOGY
Payer: MEDICARE

## 2019-05-24 PROCEDURE — H2012 BEHAV HLTH DAY TREAT, PER HR: HCPCS

## 2019-05-24 NOTE — PROGRESS NOTES
"Adult Mental Health Outpatient Group Therapy Progress Note     Client Initial Individualized Goals for Treatment: \" Symptom management of depression\".     See Initial Treatment suggestions for the client during the time between Diagnostic Assessment and completion of the Master Individualized Treatment Plan.     Treatment Goals:  1. Client will notify staff when needing assistance to develop or implement a coping plan to manage suicidal or self injurious urges.Client will use coping plan for safety, as needed.  2. When in life skills group Naomi will practice strategies to help improve motivation so as to get done what she needs to have a more satisfying life routine and providing an update of progress weekly.  3. In group Naomi  will learn, practice, generalize 2 sensory modulation or sensorimotor mindfulness based self regulation skills for improved ability to stay in the present moment and distress tolerance.  4. Bernadine will report on symptoms and identify skills to use to manage depression.  5. Naomi will improve wellness related behaviors by getting enough sleep,exercise and balanced nutrition  6. Client will establish an aftercare plan to include medical providers and social supports by discharge.       Area of Treatment Focus:  Symptom Management, Personal Safety and Community Resources/Discharge Planning    Therapeutic Interventions/Treatment Strategies:  Support, Feedback, Safety Assessments, Problem Solving, Clarification, Education and Cognitive Behavioral Therapy    Response to Treatment Strategies:  Accepted Feedback, Listened, Alert     Name of Group: Psychotherapy Group 1 on 5/24/19 at 10:00-10:50     Number of Group Participants:  4     Description and Outcome:  Client presented today with a calm presentation.  Today she seemed to be in a better place.  She did tell the group that she is getting along with her parents better.  She has not reported that at all before today.  She even has a bit of a " sense of humor now too.  Her biggest concern today had to do with her boyfriend.  She is not happy about their relationship but is not up to putting herself out into the community to meet other men.  She appreciates the companionship but is not truly happy.  She was reminded that is she was open to others that there might be someone better suited for her. She just insisted that the odds of finding someone better are just too low for her. She reports daily suicidal ideation, it is passive.  She has stayed safe during her time in the program.      Client demonstrated understanding of session content by participating verbally and then contributing in a appropriate way.     Client would benefit from practicing her new coping skills.     Is this a Weekly Review of the Progress on the Treatment Plan?  No.      Are Treatment Plan Goals being addressed?  Yes, continue treatment goals      Are Treatment Plan Strategies to Address Goals Effective?  Yes, continue treatment strategies      Are there any current contracts in place?  No

## 2019-05-24 NOTE — PROGRESS NOTES
"Adult Mental Health Outpatient Group Therapy Progress Note     Client Initial Individualized Goals for Treatment: \" Symptom management of depression\".     Individualized Treatment Plan (ITP) Goals:  1. Client will notify staff when needing assistance to develop or implement a coping plan to manage suicidal or self injurious urges.Client will use coping plan for safety, as needed.  2. When in life skills group Naomi will practice strategies to help improve motivation so as to get done what she needs to have a more satisfying life routine and providing an update of progress weekly.  3. Bernadine will learn, practice, generalize 2 sensory modulation or sensorimotor mindfulness based self regulation skills for improved ability to stay in the present moment and distress tolerance.  4. Bernadine will report on symptoms and identify skills to use to manage depression.  5. Naomi will improve wellness related behaviors by getting enough sleep,exercise and balanced nutrition  6. Client will establish an aftercare plan to include medical providers and social supports by discharge.     Area of Treatment Focus:  Symptom Management and Develop / Improve Independent Living Skills    Therapeutic Interventions/Treatment Strategies:  Support, Feedback, Structured Activity, Problem Solving, Clarification and Education    Response to Treatment Strategies:  Accepted Feedback, Listened, Focused on Goals, Attentive, Accepted Support and Alert     Name of Group: Mental Health Management  Date: 5/24/19  Time: 9:00-9:50  Number of Group Participants: 4    Description and Outcome: Bernadine attended and participated in a structured psychoeducation group where skilled intervention provides written and verbal evidence based material, facilitates experiential learning creates opportunity to develop skills through practice, increases self awareness, and provides support in problem solving ways to apply and generalize taught skills. Through the use of " "supported reflective social interactions and structured evidenced therapeutic processes in context, group members work towards stabilizing and managing mental health symptoms for improved participation and function in valued roles, routines, relationships, and independent living.       Intervention topic: Provided group with written and verbal psychoeducation material focused on self-regulation: window of tolerance to help the patients tune into their body and identify their nervous system arousal level and consider their ability to function in that level and whether they need calming or alerting. Introduction to interoceptive awareness and window of tolerance followed by experiential that helps them focus on what their needs are in a given moment, then identify skills that help them \"get into the game, stay in the game, or get back to the game\" by sharing and brainstorming (DBT, sensory modulation, mindfulness, CBT, etc) that have worked for them in the past.    Presentation and Assessment: Bernadine presents with low energy, headache but puts forth good effort and is able to identify 3 skills to help her self regulate. She easily exchanges validation and support with her peers. She would benefit from additional opportunities to practice the content to be able to generalize it to her everyday life with increased intentionality, consistency, and efficacy in support of her psychiatric recovery.    Progress towards ITP goals: Bernadine worked towards ITP goal number 3 today.     Is this a Weekly Review of the Progress on the Treatment Plan?  Yes.      Are Treatment Plan Goals being addressed?  Yes, continue treatment goals      Are Treatment Plan Strategies to Address Goals Effective?  Yes, continue treatment strategies      Are there any current contracts in place?  No        "

## 2019-05-24 NOTE — PROGRESS NOTES
"Adult Mental Health Outpatient Group Therapy Progress Note     Client Initial Individualized Goals for Treatment: \" Symptom management of depression\".        See Initial Treatment suggestions for the client during the time between Diagnostic Assessment and completion of the Master Individualized Treatment Plan.    Treatment Goals:  1. Client will notify staff when needing assistance to develop or implement a coping plan to manage suicidal or self injurious urges.Client will use coping plan for safety, as needed.  2. When in life skills group Naomi will practice strategies to help improve motivation so as to get done what she needs to have a more satisfying life routine and providing an update of progress weekly.  3. In group Naomi  will learn, practice, generalize 2 sensory modulation or sensorimotor mindfulness based self regulation skills for improved ability to stay in the present moment and distress tolerance.  4. Bernadine will report on symptoms and identify skills to use to manage depression.  5. Naomi will improve wellness related behaviors by getting enough sleep,exercise and balanced nutrition  6. Client will establish an aftercare plan to include medical providers and social supports by discharge.       Area of Treatment Focus:  Symptom Management    Therapeutic Interventions/Treatment Strategies:  Support, Feedback, Safety Assessments, Structured Activity and Education    Response to Treatment Strategies:  Accepted Feedback, Listened, Attentive, Accepted Support and Alert      Name of Group: Life  Skills(11:00-12:00)    Number of Group Participants: 4    Description and Outcome:  Client presented as calm and alert with good focus and concentration. Psychosocial skills addressed included a discussion related to resiliency skills and stress management. Goal #1 (no personal safety concerns reported or observed).  Client would benefit from additional opportunities to practice and implement content from this " session  in every day life,relationships and mental health recovery.    Is this a Weekly Review of the Progress on the Treatment Plan?  Yes.      Are Treatment Plan Goals being addressed?  Yes, continue treatment goals      Are Treatment Plan Strategies to Address Goals Effective?  Yes, continue treatment strategies      Are there any current contracts in place?  No

## 2019-05-28 ENCOUNTER — HOSPITAL ENCOUNTER (OUTPATIENT)
Dept: BEHAVIORAL HEALTH | Facility: CLINIC | Age: 44
End: 2019-05-28
Attending: PSYCHIATRY & NEUROLOGY
Payer: MEDICARE

## 2019-05-28 PROCEDURE — H2012 BEHAV HLTH DAY TREAT, PER HR: HCPCS

## 2019-05-28 NOTE — PROGRESS NOTES
"Adult Mental Health Outpatient Group Therapy Progress Note     Client Initial Individualized Goals for Treatment: \" Symptom management of depression\".     See Initial Treatment suggestions for the client during the time between Diagnostic Assessment and completion of the Master Individualized Treatment Plan.     Treatment Goals:  1. Client will notify staff when needing assistance to develop or implement a coping plan to manage suicidal or self injurious urges.Client will use coping plan for safety, as needed.  2. When in life skills group Naomi will practice strategies to help improve motivation so as to get done what she needs to have a more satisfying life routine and providing an update of progress weekly.  3. In group Naomi  will learn, practice, generalize 2 sensory modulation or sensorimotor mindfulness based self regulation skills for improved ability to stay in the present moment and distress tolerance.  4. Bernadine will report on symptoms and identify skills to use to manage depression.  5. Naomi will improve wellness related behaviors by getting enough sleep,exercise and balanced nutrition  6. Client will establish an aftercare plan to include medical providers and social supports by discharge.       Area of Treatment Focus:  Symptom Management, Personal Safety and Community Resources/Discharge Planning    Therapeutic Interventions/Treatment Strategies:  Support, Feedback, Safety Assessments, Problem Solving, Clarification, Education and Cognitive Behavioral Therapy    Response to Treatment Strategies:  Accepted Feedback, Listened, Alert     Name of Group: Psychotherapy Group 1 on 5/28/19 from [id number]     Number of Group Participants:  7.  Merged with 5A group.     Description and Outcome:  Bernadine stated that she had positive mood On Sunday.   She stated that she was able to take a day trip out of town with  A friend which she enjoyed.  She stated that she had frustration on Monday when she felt sick.  " She clarified that she did not feel depressed, but rather felt ill.  She stated that she has had this sick feeling assessed by her doctor but it has not been clarified as of this time.  Client denied suicidal ideation, intent and plan.      Client demonstrated understanding of session content by sharing current symptoms and stressors.     Is this a Weekly Review of the Progress on the Treatment Plan?  No.

## 2019-05-29 ENCOUNTER — HOSPITAL ENCOUNTER (OUTPATIENT)
Dept: BEHAVIORAL HEALTH | Facility: CLINIC | Age: 44
End: 2019-05-29
Attending: PSYCHIATRY & NEUROLOGY
Payer: MEDICARE

## 2019-05-29 PROCEDURE — H2012 BEHAV HLTH DAY TREAT, PER HR: HCPCS

## 2019-05-29 NOTE — PROGRESS NOTES
"Adult Mental Health Outpatient Group Therapy Progress Note     Client Initial Individualized Goals for Treatment: \" Symptom management of depression\".     See Initial Treatment suggestions for the client during the time between Diagnostic Assessment and completion of the Master Individualized Treatment Plan.     Treatment Goals:  1. Client will notify staff when needing assistance to develop or implement a coping plan to manage suicidal or self injurious urges.Client will use coping plan for safety, as needed.  2. When in life skills group Naomi will practice strategies to help improve motivation so as to get done what she needs to have a more satisfying life routine and providing an update of progress weekly.  3. In group Naomi  will learn, practice, generalize 2 sensory modulation or sensorimotor mindfulness based self regulation skills for improved ability to stay in the present moment and distress tolerance.  4. Bernadine will report on symptoms and identify skills to use to manage depression.  5. Naomi will improve wellness related behaviors by getting enough sleep,exercise and balanced nutrition  6. Client will establish an aftercare plan to include medical providers and social supports by discharge.       Area of Treatment Focus:  Symptom Management, Personal Safety and Community Resources/Discharge Planning    Therapeutic Interventions/Treatment Strategies:  Support, Feedback, Safety Assessments, Problem Solving, Clarification, Education and Cognitive Behavioral Therapy    Response to Treatment Strategies:  Accepted Feedback, Listened, Alert     Name of Group: Psychotherapy Group 1 on 5/29/19 at 9:00-9:50     Number of Group Participants:  6     Description and Outcome:  Client presented today with a calm presentation.  Today she seemed to be in a better place.  She did tell the group that she does agree that she is starting to see an improved mood. Today she did say that she did not have a lot to stay within " the group setting. She stated her motivation and energy were low today. She reports daily suicidal ideation but it is passive.  She has stayed safe during her time in the program.      Client demonstrated understanding of session content by participating verbally and then contributing in a appropriate way.     Client would benefit from practicing her new coping skills.     Is this a Weekly Review of the Progress on the Treatment Plan?  Yes.      Are Treatment Plan Goals being addressed?  Yes, continue treatment goals      Are Treatment Plan Strategies to Address Goals Effective?  Yes, continue treatment strategies      Are there any current contracts in place?  No

## 2019-05-29 NOTE — PROGRESS NOTES
"Adult Mental Health Outpatient Group Therapy Progress Note     Client Initial Individualized Goals for Treatment: \" Symptom management of depression\".        See Initial Treatment suggestions for the client during the time between Diagnostic Assessment and completion of the Master Individualized Treatment Plan.    Treatment Goals:  1. Client will notify staff when needing assistance to develop or implement a coping plan to manage suicidal or self injurious urges.Client will use coping plan for safety, as needed.  2. When in life skills group Naomi will practice strategies to help improve motivation so as to get done what she needs to have a more satisfying life routine and providing an update of progress weekly.  3. In group Naomi  will learn, practice, generalize 2 sensory modulation or sensorimotor mindfulness based self regulation skills for improved ability to stay in the present moment and distress tolerance.  4. Bernadine will report on symptoms and identify skills to use to manage depression.  5. Naomi will improve wellness related behaviors by getting enough sleep,exercise and balanced nutrition  6. Client will establish an aftercare plan to include medical providers and social supports by discharge.       Area of Treatment Focus:  Symptom Management    Therapeutic Interventions/Treatment Strategies:  Support, Feedback, Safety Assessments, Structured Activity and Education    Response to Treatment Strategies:  Accepted Feedback, Listened, Attentive, Accepted Support and Alert      Name of Group: Life  Skills(10:00-11:00)    Number of Group Participants: 6    Description and Outcome:  Client presented as calm and alert with good focus and concentration. Psychosocial skills addressed included a discussion related to coping through the senses strategies. Goal #1 (no personal safety concerns reported or observed).  Client would benefit from additional opportunities to practice and implement content from this " session  in every day life,relationships and mental health recovery.    Is this a Weekly Review of the Progress on the Treatment Plan?  Yes.      Are Treatment Plan Goals being addressed?  Yes, continue treatment goals      Are Treatment Plan Strategies to Address Goals Effective?  Yes, continue treatment strategies      Are there any current contracts in place?  No

## 2019-05-31 ENCOUNTER — HOSPITAL ENCOUNTER (OUTPATIENT)
Dept: BEHAVIORAL HEALTH | Facility: CLINIC | Age: 44
End: 2019-05-31
Attending: PSYCHIATRY & NEUROLOGY
Payer: MEDICARE

## 2019-05-31 PROCEDURE — H2012 BEHAV HLTH DAY TREAT, PER HR: HCPCS

## 2019-05-31 NOTE — PROGRESS NOTES
"Adult Mental Health Outpatient Group Therapy Progress Note     Client Initial Individualized Goals for Treatment: \" Symptom management of depression\".     See Initial Treatment suggestions for the client during the time between Diagnostic Assessment and completion of the Master Individualized Treatment Plan.     Treatment Goals:  1. Client will notify staff when needing assistance to develop or implement a coping plan to manage suicidal or self injurious urges.Client will use coping plan for safety, as needed.  2. When in life skills group Naomi will practice strategies to help improve motivation so as to get done what she needs to have a more satisfying life routine and providing an update of progress weekly.  3. In group Naomi  will learn, practice, generalize 2 sensory modulation or sensorimotor mindfulness based self regulation skills for improved ability to stay in the present moment and distress tolerance.  4. Bernadine will report on symptoms and identify skills to use to manage depression.  5. Naomi will improve wellness related behaviors by getting enough sleep,exercise and balanced nutrition  6. Client will establish an aftercare plan to include medical providers and social supports by discharge.       Area of Treatment Focus:  Symptom Management, Personal Safety and Community Resources/Discharge Planning    Therapeutic Interventions/Treatment Strategies:  Support, Feedback, Safety Assessments, Problem Solving, Clarification, Education and Cognitive Behavioral Therapy    Response to Treatment Strategies:  Accepted Feedback, Listened, Alert     Name of Group: Psychotherapy Group 1 on 5/30/19 10:00-10:50     Number of Group Participants:  4     Description and Outcome:  Bernadine talked in group today about a stressor that will be occurring tomorrow. She will be moving her furniture out of her parents house into storage tomorrow. Her parents insisted and she knows her Dad will be anxious and hypervigilant " regarding the movement of the furniture. The group talked about how to work with Dad and be calm. She will most likely go out with friends after the move. She also talked about needing to clarify with a friend if they will be looking at a 2 bedroom apartment or if her friend doesn't want to live together. Bernadine feels she got mixed messages and so needs to be assertive and ask her again. She was given some ideas. Bernadine states symptoms are stable and no safety concerns present today.      Client demonstrated understanding of session content by participating verbally and then contributing in a appropriate way.       Is this a Weekly Review of the Progress on the Treatment Plan?  no

## 2019-05-31 NOTE — PROGRESS NOTES
"Adult Mental Health Outpatient Group Therapy Progress Note     Client Initial Individualized Goals for Treatment: \" Symptom management of depression\".        See Initial Treatment suggestions for the client during the time between Diagnostic Assessment and completion of the Master Individualized Treatment Plan.    Treatment Goals:  1. Client will notify staff when needing assistance to develop or implement a coping plan to manage suicidal or self injurious urges.Client will use coping plan for safety, as needed.  2. When in life skills group Naomi will practice strategies to help improve motivation so as to get done what she needs to have a more satisfying life routine and providing an update of progress weekly.  3. In group Naomi  will learn, practice, generalize 2 sensory modulation or sensorimotor mindfulness based self regulation skills for improved ability to stay in the present moment and distress tolerance.  4. Bernadine will report on symptoms and identify skills to use to manage depression.  5. Naomi will improve wellness related behaviors by getting enough sleep,exercise and balanced nutrition  6. Client will establish an aftercare plan to include medical providers and social supports by discharge.       Area of Treatment Focus:  Symptom Management    Therapeutic Interventions/Treatment Strategies:  Support, Feedback, Safety Assessments, Structured Activity and Education    Response to Treatment Strategies:  Accepted Feedback, Listened, Attentive, Accepted Support and Alert      Name of Group: Life  Skills(11:00-12:00)    Number of Group Participants: 4    Description and Outcome:  Client presented as calm and alert with good focus and concentration. Psychosocial skills addressed included a discussion related to self esteem/self compassion. Goal #1 (no personal safety concerns reported or observed). Goal #2 Client reports that she still struggles with low motivation when not in this program.  Client would " benefit from additional opportunities to practice and implement content from this session  in every day life,relationships and mental health recovery.    Is this a Weekly Review of the Progress on the Treatment Plan?  Yes.      Are Treatment Plan Goals being addressed?  Yes, continue treatment goals      Are Treatment Plan Strategies to Address Goals Effective?  Yes, continue treatment strategies      Are there any current contracts in place?  No

## 2019-06-04 ENCOUNTER — HOSPITAL ENCOUNTER (OUTPATIENT)
Dept: BEHAVIORAL HEALTH | Facility: CLINIC | Age: 44
End: 2019-06-04
Attending: PSYCHIATRY & NEUROLOGY
Payer: MEDICARE

## 2019-06-04 PROCEDURE — H2012 BEHAV HLTH DAY TREAT, PER HR: HCPCS

## 2019-06-05 ENCOUNTER — HOSPITAL ENCOUNTER (OUTPATIENT)
Dept: BEHAVIORAL HEALTH | Facility: CLINIC | Age: 44
End: 2019-06-05
Attending: PSYCHIATRY & NEUROLOGY
Payer: MEDICARE

## 2019-06-05 PROCEDURE — H2012 BEHAV HLTH DAY TREAT, PER HR: HCPCS

## 2019-06-05 NOTE — PROGRESS NOTES
Psychiatry staffing: case discussed  Diagnosis: Bipolar. Doing well.     Current Outpatient Medications   Medication     BuPROPion HCl (WELLBUTRIN PO)     etonogestrel-ethinyl estradiol (NUVARING) 0.12-0.015 MG/24HR vaginal ring     lamoTRIgine (LAMICTAL) 150 MG tablet     levothyroxine (SYNTHROID/LEVOTHROID) 50 MCG tablet     norethindrone-ethinyl estradiol (MICROGESTIN 1.5/30) 1.5-30 MG-MCG per tablet     TRAZODONE HCL PO     Vilazodone HCl (VIIBRYD PO)     No current facility-administered medications for this encounter.      Past Medical History:   Diagnosis Date     Depression     In counseling; has Psychiatrist     Depressive disorder            .

## 2019-06-05 NOTE — PROGRESS NOTES
"Adult Mental Health Outpatient Group Therapy Progress Note     Client Initial Individualized Goals for Treatment: \" Symptom management of depression\".     Individualized Treatment Plan (ITP) Goals:  1. Client will notify staff when needing assistance to develop or implement a coping plan to manage suicidal or self injurious urges.Client will use coping plan for safety, as needed.  2. When in life skills group Naomi will practice strategies to help improve motivation so as to get done what she needs to have a more satisfying life routine and providing an update of progress weekly.  3. Bernadine will learn, practice, generalize 2 sensory modulation or sensorimotor mindfulness based self regulation skills for improved ability to stay in the present moment and distress tolerance.  4. Bernadine will report on symptoms and identify skills to use to manage depression.  5. Naomi will improve wellness related behaviors by getting enough sleep,exercise and balanced nutrition  6. Client will establish an aftercare plan to include medical providers and social supports by discharge.     Area of Treatment Focus:  Symptom Management and Develop / Improve Independent Living Skills    Therapeutic Interventions/Treatment Strategies:  Support, Feedback, Structured Activity, Problem Solving, Clarification and Education    Response to Treatment Strategies:  Accepted Feedback, Listened, Focused on Goals, Attentive, Accepted Support and Alert     Name of Group: Mental Health Management  Date: 5/31/19  Time: 9:00-9:50  Number of Group Participants: 3    Description and Outcome: Bernadine attended and participated in a structured psychoeducation group where skilled intervention provides written and verbal evidence based material, facilitates experiential learning creates opportunity to develop skills through practice, increases self awareness, and provides support in problem solving ways to apply and generalize taught skills. Through the use of " supported reflective social interactions and structured evidenced therapeutic processes in context, group members work towards stabilizing and managing mental health symptoms for improved participation and function in valued roles, routines, relationships, and independent living.    Intervention topic: Sensory Approaches in Mental Health:  Coping Through the Senses Introduction: Patients were introduced and taught about neurosensory based skills and strategies related to supporting effective self regulation skills.  Patients were taught about the eight sensory systems (external and internal) and how they can be used for coping with mental health symptoms and stressors.  Patients were provided with an experiential opportunity to increase self-awareness of helpful sensory input and self care activities. Patients were introduced on how to create supportive environments that encourage use of these skills.         Patient Session Goals / Objectives:    Review/learn the 8 sensory systems and how they relate to self-regulation    Identified specific and individualized neurosensory skills to help when distressed      Identified skills learned and how this applies to current daily life    Established a plan for practice of these skills in their own environments     Presentation and Assessment: Bernadine presents with calm focused affect, brightens at times. She demonstrates understanding through active participation in experiential process and sharing. She identified one way to apply content to her weekend.  She would benefit from additional opportunities to practice the content to be able to generalize it to her everyday life with increased intentionality, consistency, and efficacy in support of her psychiatric recovery  Progress towards ITP goals: Bernadine worked towards ITP goal number 3 today.     Is this a Weekly Review of the Progress on the Treatment Plan?  Yes.      Are Treatment Plan Goals being addressed?  Yes, continue  treatment goals      Are Treatment Plan Strategies to Address Goals Effective?  Yes, continue treatment strategies      Are there any current contracts in place?  No

## 2019-06-05 NOTE — PROGRESS NOTES
"Adult Mental Health Outpatient Group Therapy Progress Note     Client Initial Individualized Goals for Treatment: \" Symptom management of depression\".        See Initial Treatment suggestions for the client during the time between Diagnostic Assessment and completion of the Master Individualized Treatment Plan.    Treatment Goals:  1. Client will notify staff when needing assistance to develop or implement a coping plan to manage suicidal or self injurious urges.Client will use coping plan for safety, as needed.  2. When in life skills group Naomi will practice strategies to help improve motivation so as to get done what she needs to have a more satisfying life routine and providing an update of progress weekly.  3. In group Naomi  will learn, practice, generalize 2 sensory modulation or sensorimotor mindfulness based self regulation skills for improved ability to stay in the present moment and distress tolerance.  4. Bernadine will report on symptoms and identify skills to use to manage depression.  5. Naomi will improve wellness related behaviors by getting enough sleep,exercise and balanced nutrition  6. Client will establish an aftercare plan to include medical providers and social supports by discharge.       Area of Treatment Focus:  Symptom Management    Therapeutic Interventions/Treatment Strategies:  Support, Feedback, Safety Assessments, Structured Activity and Education    Response to Treatment Strategies:  Accepted Feedback, Listened, Attentive, Accepted Support and Alert      Name of Group: Life  Skills(10:00-11:00)    Number of Group Participants: 4    Description and Outcome:  Client presented as calm and alert with good focus and concentration. Psychosocial skills addressed included a discussion related to self awareness/self expression.Client spoke and talked about her part-time job at [employer] in a positive manner. Goal #1 (no personal safety concerns reported or observed).   Client would " benefit from additional opportunities to practice and implement content from this session  in every day life,relationships and mental health recovery.    Is this a Weekly Review of the Progress on the Treatment Plan?  Yes.      Are Treatment Plan Goals being addressed?  Yes, continue treatment goals      Are Treatment Plan Strategies to Address Goals Effective?  Yes, continue treatment strategies      Are there any current contracts in place?  No

## 2019-06-07 ENCOUNTER — HOSPITAL ENCOUNTER (OUTPATIENT)
Dept: BEHAVIORAL HEALTH | Facility: CLINIC | Age: 44
End: 2019-06-07
Attending: PSYCHIATRY & NEUROLOGY
Payer: MEDICARE

## 2019-06-07 PROCEDURE — H2012 BEHAV HLTH DAY TREAT, PER HR: HCPCS

## 2019-06-07 NOTE — PROGRESS NOTES
"Adult Mental Health Outpatient Group Therapy Progress Note     Client Initial Individualized Goals for Treatment: \" Symptom management of depression\".    See Initial Treatment suggestions for the client during the time between Diagnostic Assessment and completion of the Master Individualized Treatment Plan.    Treatment Goals:  1. Client will notify staff when needing assistance to develop or implement a coping plan to manage suicidal or self injurious urges.Client will use coping plan for safety, as needed.  2. When in life skills group Naomi will practice strategies to help improve motivation so as to get done what she needs to have a more satisfying life routine and providing an update of progress weekly.  3. In group Naomi  will learn, practice, generalize 2 sensory modulation or sensorimotor mindfulness based self regulation skills for improved ability to stay in the present moment and distress tolerance.  4. Bernadine will report on symptoms and identify skills to use to manage depression.  5. Naomi will improve wellness related behaviors by getting enough sleep,exercise and balanced nutrition  6. Client will establish an aftercare plan to include medical providers and social supports by discharge.    Area of Treatment Focus:  Symptom Management    Therapeutic Interventions/Treatment Strategies:  Support, Feedback, Safety Assessments, Structured Activity and Education    Response to Treatment Strategies:  Accepted Feedback, Listened, Attentive, Accepted Support and Alert      Name of Group: Life  Skills(11:00-12:00)    Number of Group Participants: 3    Description and Outcome:  Client presented as calm and alert with good focus and concentration. Psychosocial skills addressed included a discussion related to compensatory strategies with a focus on memory. Goal #1 (no personal safety concerns reported or observed). Goal #2 Per observation client is making good progress.  Client would benefit from additional " opportunities to practice and implement content from this session  in every day life,relationships and mental health recovery.    Is this a Weekly Review of the Progress on the Treatment Plan?  Yes.      Are Treatment Plan Goals being addressed?  Yes, continue treatment goals      Are Treatment Plan Strategies to Address Goals Effective?  Yes, continue treatment strategies      Are there any current contracts in place?  No

## 2019-06-11 ENCOUNTER — HOSPITAL ENCOUNTER (OUTPATIENT)
Dept: BEHAVIORAL HEALTH | Facility: CLINIC | Age: 44
End: 2019-06-11
Attending: PSYCHIATRY & NEUROLOGY
Payer: MEDICARE

## 2019-06-11 PROCEDURE — H2012 BEHAV HLTH DAY TREAT, PER HR: HCPCS

## 2019-06-11 NOTE — PROGRESS NOTES
"Adult Mental Health Outpatient Group Therapy Progress Note     Client Initial Individualized Goals for Treatment: \" Symptom management of depression\".     See Initial Treatment suggestions for the client during the time between Diagnostic Assessment and completion of the Master Individualized Treatment Plan.     Treatment Goals:  1. Client will notify staff when needing assistance to develop or implement a coping plan to manage suicidal or self injurious urges.Client will use coping plan for safety, as needed.  2. When in life skills group Naomi will practice strategies to help improve motivation so as to get done what she needs to have a more satisfying life routine and providing an update of progress weekly.  3. In group Naomi  will learn, practice, generalize 2 sensory modulation or sensorimotor mindfulness based self regulation skills for improved ability to stay in the present moment and distress tolerance.  4. Bernadine will report on symptoms and identify skills to use to manage depression.  5. Naomi will improve wellness related behaviors by getting enough sleep,exercise and balanced nutrition  6. Client will establish an aftercare plan to include medical providers and social supports by discharge.       Area of Treatment Focus:  Symptom Management, Personal Safety and Community Resources/Discharge Planning    Therapeutic Interventions/Treatment Strategies:  Support, Feedback, Safety Assessments, Problem Solving, Clarification, Education and Cognitive Behavioral Therapy    Response to Treatment Strategies:  Accepted Feedback, Listened, Alert     Name of Group: Psychotherapy Group 1 and 2 on 06/11/19 at 9:00-10:50 am     Number of Group Participants:  3     Description and Outcome:  Client presented today with a calm and brighter presentation.  Today she seemed to be in a better place and this has been consistent.  She did tell the group that she does agree that she is starting to see an improved mood. She " thinks that it is the Wellbutrin and Effexor combination. Today she did talk about how good her weekend was and no arguing with her parents.  She has been going out with friends more and more.  She has even broke up with a boyfriend and feels some relief. She stated her motivation and energy is still improving too. She reports daily suicidal ideation but it is passive. She has stayed safe during her time in the program.     The second group today was about using one's self soothing skills when dysregulated emotionally.  We talked about using your five senses and how each one can be distinctly helpful.  Each person was encouraged to put one together for their own personal use.      Client demonstrated understanding of session content by participating verbally and then contributing in a appropriate way.     Client would benefit from practicing her new coping skills.     Is this a Weekly Review of the Progress on the Treatment Plan?  Yes.      Are Treatment Plan Goals being addressed?  Yes, continue treatment goals      Are Treatment Plan Strategies to Address Goals Effective?  Yes, continue treatment strategies      Are there any current contracts in place?  No

## 2019-06-12 ENCOUNTER — HOSPITAL ENCOUNTER (OUTPATIENT)
Dept: BEHAVIORAL HEALTH | Facility: CLINIC | Age: 44
End: 2019-06-12
Attending: PSYCHIATRY & NEUROLOGY
Payer: MEDICARE

## 2019-06-12 PROCEDURE — H2012 BEHAV HLTH DAY TREAT, PER HR: HCPCS

## 2019-06-12 NOTE — PROGRESS NOTES
"Adult Mental Health Outpatient Group Therapy Progress Note     Client Initial Individualized Goals for Treatment: \" Symptom management of depression\".     See Initial Treatment suggestions for the client during the time between Diagnostic Assessment and completion of the Master Individualized Treatment Plan.     Treatment Goals:  1. Client will notify staff when needing assistance to develop or implement a coping plan to manage suicidal or self injurious urges.Client will use coping plan for safety, as needed.  2. When in life skills group Naomi will practice strategies to help improve motivation so as to get done what she needs to have a more satisfying life routine and providing an update of progress weekly.  3. In group Naomi  will learn, practice, generalize 2 sensory modulation or sensorimotor mindfulness based self regulation skills for improved ability to stay in the present moment and distress tolerance.  4. Bernadine will report on symptoms and identify skills to use to manage depression.  5. Naomi will improve wellness related behaviors by getting enough sleep,exercise and balanced nutrition  6. Client will establish an aftercare plan to include medical providers and social supports by discharge.       Area of Treatment Focus:  Symptom Management, Personal Safety and Community Resources/Discharge Planning    Therapeutic Interventions/Treatment Strategies:  Support, Feedback, Safety Assessments, Problem Solving, Clarification, Education and Cognitive Behavioral Therapy    Response to Treatment Strategies:  Accepted Feedback, Listened, Alert     Name of Group: Psychotherapy Group 9:00-9:50AM     Number of Group Participants:       Description and Outcome:  Bernadine arrived to group on time and presented with euthymic mood. She endorsed absence of suicidal ideation and self-injurious behavior urges. Bernadine shared that \"nothing really happened\" between yesterday and today but that she \"slept really crappy.\" Bernadine " noted that she has been waking up several times in the middle of the night which has caused her to feel tired. Bernadine shared that when she feels tired she goes home from group and naps and it is hard to wake up. Writer and group members offered validation regarding difficulty of lack of sleep. Writer encouraged Bernadine to identify some sleep hygiene techniques to improve sleep such has limiting naps and mindfulness. Bernadine was resistant to skill suggestions. Bernadine shared that she plans to talk to her psychiatrist regarding sleep medication at her next appointment. Bernadine also noted progress towards treatment plan goals including spending time with friends.     Lauryn Urbina, [facility], LADC  6/12/2019                 Is this a Weekly Review of the Progress on the Treatment Plan?  No

## 2019-06-13 NOTE — ADDENDUM NOTE
Encounter addended by: Dorothy Last RN on: 12/26/2017  4:18 PM<BR>     Actions taken: Flowsheet accepted Patient : Kurt Mukherjee Age: 34 year old Sex: male   MRN: [id number] Encounter Date: 6/12/2019  E08/08    History     Chief Complaint   Patient presents with   • Depression   • Abdominal Pain     HPI  6/12/2019  9:41 PM Kurt Mukherjee is a 34 year old male who presents to the ED for evaluation of abd pain that began today. He notes that his \"pancreas hurts\". He states he \"took the pill the doctors gave him and then my abd pain started\". Associated sx include depression which he wants help for. He wants to go back to [facility]. He was at [facility] earlier today and he didn't have abd pain and his depression was not present. He last consumed EtOH 4 days ago. He is doing IOP at [facility] and is planning on attending meetings and getting a sponsor to stay sober. There are no further complaints or modifying factors at this time.    PCP: Verify Pcp      Allergies   Allergen Reactions   • Beans   (Food) ANAPHYLAXIS   • Bee Sting ANAPHYLAXIS   • Mushroom   (Food) ANAPHYLAXIS   • Penicillins RASH       Current Discharge Medication List      Prior to Admission Medications    Details   acamprosate (CAMPRAL EC) 333 MG tablet Take 2 tablets by mouth 3 times daily.  Qty: 90 tablet, Refills: 4      ARIPiprazole (ABILIFY) 10 MG tablet Take 1 tablet by mouth daily.  Qty: 30 tablet, Refills: 1      diclofenac (VOLTAREN) 50 MG EC tablet Take 1 tablet by mouth 2 times daily.  Qty: 14 tablet, Refills: 0      acetaminophen (TYLENOL) 500 MG tablet Take 1,000 mg by mouth every 6 hours as needed for Pain. Dose: 2 tablets (=1000 mg)      calcium carbonate (TUMS) 500 MG chewable tablet Chew 2 tablets by mouth as needed for Heartburn.             Current Discharge Medication List          Past Medical History:   Diagnosis Date   • ADD (attention deficit disorder)    • Alcohol abuse    • Attention deficit disorder    • Depression    • Depression with anxiety 3/30/2015   • Essential (primary) hypertension    • Fracture     Right hand   •  Fracture     Right wrist   • ASHLEY (generalized anxiety disorder) 3/30/2015   • Gastroesophageal reflux disease    • Panic attacks 3/30/2015   • PTSD (post-traumatic stress disorder)     Served in [place]   • Seizures (CMS/HCC)        Past Surgical History:   Procedure Laterality Date   • PELVIS/HIP JOINT SURGERY UNLISTED  2008    left hip replacement     History reviewed. No pertinent family history.    Social History     Tobacco Use   • Smoking status: Current Every Day Smoker     Packs/day: 0.50     Types: Cigarettes   • Smokeless tobacco: Never Used   Substance Use Topics   • Alcohol use: Yes     Frequency: Never     Comment: sober for 4 days    • Drug use: Yes     Types: Marijuana       Review of Systems   Constitutional: Negative for chills and fatigue.   HENT: Negative for congestion and sore throat.    Respiratory: Negative for cough, chest tightness, shortness of breath and wheezing.    Cardiovascular: Negative for chest pain.   Gastrointestinal: Positive for abdominal pain. Negative for diarrhea, nausea and vomiting.   Genitourinary: Negative for difficulty urinating, dysuria, flank pain and hematuria.   Musculoskeletal: Negative for back pain and neck pain.   Allergic/Immunologic: Negative for food allergies.   Neurological: Negative for dizziness, weakness and headaches.   Psychiatric/Behavioral: Negative for confusion and suicidal ideas.        Positive for depression   All other systems reviewed and are negative.      Physical Exam     ED Triage Vitals [06/12/19 2001]   ED Triage Vitals Group      Temp 98.8 °F (37.1 °C)      Pulse 99      Resp 18      BP (!) 169/108      SpO2 100 %      EtCO2 mmHg       Height 5' 7\" (1.702 m)      Weight 165 lb (74.8 kg)      Weight Scale Used        Physical Exam   Constitutional: He is oriented to person, place, and time. He appears well-developed and well-nourished. No distress.   HENT:   Head: Normocephalic and atraumatic.   Mouth/Throat: Oropharynx is clear and moist.    Eyes: Pupils are equal, round, and reactive to light. EOM are normal. Right eye exhibits no discharge. Left eye exhibits no discharge.   Neck: Normal range of motion. No JVD present. No tracheal deviation present.   Cardiovascular: Normal rate, normal heart sounds and intact distal pulses.   No murmur heard.  Pulmonary/Chest: Effort normal and breath sounds normal. No respiratory distress.   Abdominal: Bowel sounds are normal. He exhibits no distension. There is no tenderness. There is no rebound and no guarding.   Musculoskeletal: Normal range of motion.   Neurological: He is alert and oriented to person, place, and time. No cranial nerve deficit. GCS eye subscore is 4. GCS verbal subscore is 5. GCS motor subscore is 6.   Skin: Skin is warm and dry. No rash noted. No erythema.   Psychiatric: He has a normal mood and affect. His behavior is normal. Thought content normal.   Nursing note and vitals reviewed.      ED Course     Procedures    Lab Results     Results for orders placed or performed during the hospital encounter of 06/12/19   CBC & Auto Differential   Result Value Ref Range    WBC 12.7 (H) 4.2 - 11.0 K/mcL    RBC 4.84 4.50 - 5.90 mil/mcL    HGB 14.7 13.0 - 17.0 g/dL    HCT 43.1 39.0 - 51.0 %    MCV 89.0 78.0 - 100.0 fl    MCH 30.4 26.0 - 34.0 pg    MCHC 34.1 32.0 - 36.5 g/dL    RDW-CV 13.7 11.0 - 15.0 %     140 - 450 K/mcL    NRBC 0 0 /100 WBC    DIFF TYPE AUTOMATED DIFFERENTIAL     Neutrophil 70 %    LYMPH 20 %    MONO 8 %    EOSIN 1 %    BASO 0 %    Percent Immature Granuloctyes 1 %    Absolute Neutrophil 8.9 (H) 1.8 - 7.7 K/mcL    Absolute Lymph 2.6 1.0 - 4.8 K/mcL    Absolute Mono 1.0 (H) 0.3 - 0.9 K/mcL    Absolute Eos 0.1 0.1 - 0.5 K/mcL    Absolute Baso 0.0 0.0 - 0.3 K/mcL    Absolute Immature Granulocytes 0.1 0 - 0.2 K/mcl   Comprehensive Metabolic Panel   Result Value Ref Range    Sodium 137 135 - 145 mmol/L    Potassium 3.6 3.4 - 5.1 mmol/L    Chloride 105 98 - 107 mmol/L    Carbon  Dioxide 27 21 - 32 mmol/L    Anion Gap 9 (L) 10 - 20 mmol/L    Glucose 101 (H) 65 - 99 mg/dL    BUN 14 6 - 20 mg/dL    Creatinine 0.84 0.67 - 1.17 mg/dL    GFR Estimate,  >90     GFR Estimate, Non African American >90     BUN/Creatinine Ratio 17 7 - 25    CALCIUM 9.0 8.4 - 10.2 mg/dL    TOTAL BILIRUBIN 0.3 0.2 - 1.0 mg/dL    AST/SGOT 14 <38 Units/L    ALT/SGPT 27 <64 Units/L    ALK PHOSPHATASE 95 45 - 117 Units/L    TOTAL PROTEIN 7.9 6.4 - 8.2 g/dL    Albumin 4.3 3.6 - 5.1 g/dL    GLOBULIN 3.6 2.0 - 4.0 g/dL    A/G Ratio, Serum 1.2 1.0 - 2.4   Lipase Level   Result Value Ref Range    Lipase 60 (L) 73 - 393 Units/L   Alcohol Level Serum   Result Value Ref Range    Alcohol Ethyl None detected. None detected. mg/dL   Drug Abuse Screen Basic Urine   Result Value Ref Range    U-Amphetamines NEGATIVE NEGATIVE    U-Barbiturates NEGATIVE NEGATIVE    U-Benzodiazepines POSITIVE (A) NEGATIVE    Cannabinoids (THC) UA POSITIVE (A) NEGATIVE    U-Cocaine/Metabolite NEGATIVE NEGATIVE    Opiates UA NEGATIVE NEGATIVE    U-PHENCYCLIDINE NEGATIVE NEGATIVE       Radiology Results     Imaging Results    None         ED Medication Orders (From admission, onward)    None               MDM  Vitals  Vitals:    06/12/19 2001 06/12/19 2145 06/12/19 2200 06/12/19 2220   BP: (!) 169/108 (!) 169/106 150/86 148/79   Pulse: 99 94     Resp: 18 16     Temp: 98.8 °F (37.1 °C)      TempSrc: Oral      SpO2: 100% 97% 98% 98%   Weight: 74.8 kg      Height: 5' 7\" (1.702 m)          ED Course  10:15 PM I spoke with Marjorie from [facility] regarding the patient's presentation. They evaluated him. He is set for IOP tomorrow. [facility]. He does not have SI.    10:36 PM I rechecked the pt. We discussed results of unremarkable labs. I discussed my consult with [facility]. We discussed plan for IOP and stay in the rescue mission tonight. The patient understands and agrees with the plan. Any questions were  answered.      [facility]  Critical Care time spent on this patient outside of billable procedures:  None    Clinical Impression  ED Diagnoses        Final diagnoses    Depression, unspecified depression type          Epigastric pain                The patient was provided with a recommendation to follow up with a primary care provider and obtain reassessment of his/her blood pressure within three months.    Follow Up:  Intensive Outpatient Program      as scheduled tomorrow.          Summary of your Discharge Medications      You have not been prescribed any medications.         Pt is discharged to home/self care in stable condition.       I have reviewed the information recorded by the scribe for accuracy and agree with its contents.    ____________________________________________________________________    Kayce Jacinto acting as a scribe for Dr. Connor Venegas  Dictation # [id number]  Scribe: Kayce Venegas,   06/12/19 1058

## 2019-06-13 NOTE — PROGRESS NOTES
"Adult Mental Health Outpatient Group Therapy Progress Note     Client Initial Individualized Goals for Treatment: \" Symptom management of depression\".    See Initial Treatment suggestions for the client during the time between Diagnostic Assessment and completion of the Master Individualized Treatment Plan.    Treatment Goals:  1. Client will notify staff when needing assistance to develop or implement a coping plan to manage suicidal or self injurious urges.Client will use coping plan for safety, as needed.  2. When in life skills group Naomi will practice strategies to help improve motivation so as to get done what she needs to have a more satisfying life routine and providing an update of progress weekly.  3. In group Naomi  will learn, practice, generalize 2 sensory modulation or sensorimotor mindfulness based self regulation skills for improved ability to stay in the present moment and distress tolerance.  4. Bernadine will report on symptoms and identify skills to use to manage depression.  5. Naomi will improve wellness related behaviors by getting enough sleep,exercise and balanced nutrition  6. Client will establish an aftercare plan to include medical providers and social supports by discharge.    Area of Treatment Focus:  Symptom Management    Therapeutic Interventions/Treatment Strategies:  Support, Feedback, Safety Assessments, Structured Activity and Education    Response to Treatment Strategies:  Accepted Feedback, Listened, Attentive, Accepted Support and Alert      Name of Group: Life  Skills(11:00-12:00)    Number of Group Participants: 3    Description and Outcome:  Client presented as calm and alert with good focus and concentration. Psychosocial skills addressed included a discussion related to mental health social support. Goal #1 (no personal safety concerns reported or observed). Client also participated in a treatment plan review(see treatment plan for progress update). Goal #6  Addressed per " group discussion.  Client would benefit from additional opportunities to practice and implement content from this session  in every day life,relationships and mental health recovery.    Is this a Weekly Review of the Progress on the Treatment Plan?  Yes.      Are Treatment Plan Goals being addressed?  Yes, continue treatment goals      Are Treatment Plan Strategies to Address Goals Effective?  Yes, continue treatment strategies      Are there any current contracts in place?  No

## 2019-06-14 ENCOUNTER — HOSPITAL ENCOUNTER (OUTPATIENT)
Dept: BEHAVIORAL HEALTH | Facility: CLINIC | Age: 44
End: 2019-06-14
Attending: PSYCHIATRY & NEUROLOGY
Payer: MEDICARE

## 2019-06-14 PROCEDURE — H2012 BEHAV HLTH DAY TREAT, PER HR: HCPCS

## 2019-06-14 NOTE — PROGRESS NOTES
"Adult Mental Health Outpatient Group Therapy Progress Note     Client Initial Individualized Goals for Treatment: \" Symptom management of depression\".     Individualized Treatment Plan (ITP) Goals:  1. Client will notify staff when needing assistance to develop or implement a coping plan to manage suicidal or self injurious urges.Client will use coping plan for safety, as needed.  2. When in life skills group Naomi will practice strategies to help improve motivation so as to get done what she needs to have a more satisfying life routine and providing an update of progress weekly.  3. Bernadine will learn, practice, generalize 2 sensory modulation or sensorimotor mindfulness based self regulation skills for improved ability to stay in the present moment and distress tolerance.  4. Bernadine will report on symptoms and identify skills to use to manage depression.  5. Naomi will improve wellness related behaviors by getting enough sleep,exercise and balanced nutrition  6. Client will establish an aftercare plan to include medical providers and social supports by discharge.     Area of Treatment Focus:  Symptom Management and Develop / Improve Independent Living Skills    Therapeutic Interventions/Treatment Strategies:  Support, Feedback, Structured Activity, Problem Solving, Clarification and Education    Response to Treatment Strategies:  Accepted Feedback, Listened, Focused on Goals, Attentive, Accepted Support and Alert     Name of Group: Mental Health Management  Date: 6/07/19  Time: 9:00-9:50  Number of Group Participants: 4    Group Purpose and Description: Bernadine attended and participated in a structured psychoeducation group where skilled intervention provides written and verbal evidence based material, facilitates experiential learning creates opportunity to develop skills through practice, increases self awareness, and provides support in problem solving ways to apply and generalize taught skills. Through the use of " supported reflective social interactions and structured evidenced therapeutic processes in context, group members work towards stabilizing and managing mental health symptoms for improved participation and function in valued roles, routines, relationships, and independent living.    Session Topic: Discussed:  Sensory Approaches in Mental Health:  Sensory Enhanced Mindfulness:  Patients were taught and provided with an opportunity to explore and practice how using sensory enhanced mindfulness practices can help them stay grounded in the present moment as a way to manage mental health symptoms and stressors.           Patient Session Goals / Objectives:    Identified how using sensory enhanced mindfulness practices can be used for grounding, stress management, and self regulation      Improved awareness of different types of sensory enhanced mindfulness activities that assist with healthy coping of stress and symptoms      Established a plan for practice of these skills in their own environments    Practiced and reflected on how to generalize taught skills to their everyday life    Patient Participation / Response:  Patient presentation: focused, good effort, reports okay mood and energy this morning.  Demonstrated understanding of topics discussed through group discussion and participation  Demonstrated understanding of sensory enhanced mindfulness activities and how they can apply this to everyday life to better manage mental health symptoms and stress  Identified / Expressed personal readiness to explore sensory enhanced mindfulness activities to help with stress management, self care, health maintenance, and self regulation   Verbalized understanding of how the use of sensory enhanced mindfulness activities and strategies can assist with management of mental health symptoms and stressors  Practiced skills in session  Progress towards ITP goals: Bernadine worked towards ITP goal number 3 today.     Is this a Weekly  Review of the Progress on the Treatment Plan?  Yes.      Are Treatment Plan Goals being addressed?  Yes, continue treatment goals      Are Treatment Plan Strategies to Address Goals Effective?  Yes, continue treatment strategies      Are there any current contracts in place?  No

## 2019-06-14 NOTE — PROGRESS NOTES
"Adult Mental Health Outpatient Group Therapy Progress Note     Client Initial Individualized Goals for Treatment: \" Symptom management of depression\".     See Initial Treatment suggestions for the client during the time between Diagnostic Assessment and completion of the Master Individualized Treatment Plan.     Treatment Goals:  1. Client will notify staff when needing assistance to develop or implement a coping plan to manage suicidal or self injurious urges.Client will use coping plan for safety, as needed.  2. When in life skills group Naomi will practice strategies to help improve motivation so as to get done what she needs to have a more satisfying life routine and providing an update of progress weekly.  3. In group Naomi  will learn, practice, generalize 2 sensory modulation or sensorimotor mindfulness based self regulation skills for improved ability to stay in the present moment and distress tolerance.  4. Bernadine will report on symptoms and identify skills to use to manage depression.  5. Naomi will improve wellness related behaviors by getting enough sleep,exercise and balanced nutrition  6. Client will establish an aftercare plan to include medical providers and social supports by discharge.       Area of Treatment Focus:  Symptom Management, Personal Safety and Community Resources/Discharge Planning    Therapeutic Interventions/Treatment Strategies:  Support, Feedback, Safety Assessments, Problem Solving, Clarification, Education and Cognitive Behavioral Therapy    Response to Treatment Strategies:  Accepted Feedback, Listened, Alert     Name of Group: Psychotherapy Group 1 on 06/14/19 at 10:00-10:50 am     Number of Group Participants:  4     Description and Outcome:  Client presented today with a calm and brighter presentation.  Today she seemed to be in a better place and this has been more consistent.  She did tell the group that she does agree that she is starting to see an improved mood. She " thinks that it is the Wellbutrin and Effexor combination. Today she did state that even though her mood is improved her motivation and energy is not.  She was upset today about another client in the group on Wednesday who was intrusive and hard to manage even for the therapist.  She reports daily suicidal ideation but it is passive. She has stayed safe during her time in the program. For the weekend she is getting herself and manicure and a pedicure and she is excited.    Client demonstrated understanding of session content by participating verbally and then contributing in a appropriate way.     Client would benefit from practicing her new coping skills.     Is this a Weekly Review of the Progress on the Treatment Plan?  No.      Are Treatment Plan Goals being addressed?  Yes, continue treatment goals      Are Treatment Plan Strategies to Address Goals Effective?  Yes, continue treatment strategies      Are there any current contracts in place?  No

## 2019-06-18 ENCOUNTER — HOSPITAL ENCOUNTER (OUTPATIENT)
Dept: BEHAVIORAL HEALTH | Facility: CLINIC | Age: 44
End: 2019-06-18
Attending: PSYCHIATRY & NEUROLOGY
Payer: MEDICARE

## 2019-06-18 PROCEDURE — H2012 BEHAV HLTH DAY TREAT, PER HR: HCPCS

## 2019-06-18 NOTE — PROGRESS NOTES
Current Outpatient Medications:      BuPROPion HCl (WELLBUTRIN PO), Take 3 mg by mouth daily, Disp: , Rfl:      etonogestrel-ethinyl estradiol (NUVARING) 0.12-0.015 MG/24HR vaginal ring, Place 1 ring every 21 days then remove for 1 week., Disp: 3 each, Rfl: 3     lamoTRIgine (LAMICTAL) 150 MG tablet, Take 150 mg by mouth, Disp: , Rfl:      levothyroxine (SYNTHROID/LEVOTHROID) 50 MCG tablet, Take 50 mcg by mouth, Disp: , Rfl:      norethindrone-ethinyl estradiol (MICROGESTIN 1.5/30) 1.5-30 MG-MCG per tablet, Take 1 tablet by mouth daily, Disp: , Rfl:      TRAZODONE HCL PO, Take 100 mg by mouth At Bedtime, Disp: , Rfl:      Vilazodone HCl (VIIBRYD PO), Take 20 mg by mouth, Disp: , Rfl:    Past Medical History:   Diagnosis Date     Depression     In counseling; has Psychiatrist     Depressive disorder    Psychiatry staffing: case discussed  Diagnosis:  MDD vs bipolar; on effexor and wellbutrin; wellbutrin will increase effexor levels.  Doing better.

## 2019-06-18 NOTE — PROGRESS NOTES
"Adult Mental Health Outpatient Group Therapy Progress Note     Client Initial Individualized Goals for Treatment: \" Symptom management of depression\".     See Initial Treatment suggestions for the client during the time between Diagnostic Assessment and completion of the Master Individualized Treatment Plan.     Treatment Goals:  1. Client will notify staff when needing assistance to develop or implement a coping plan to manage suicidal or self injurious urges.Client will use coping plan for safety, as needed.  2. When in life skills group Naomi will practice strategies to help improve motivation so as to get done what she needs to have a more satisfying life routine and providing an update of progress weekly.  3. In group Naomi  will learn, practice, generalize 2 sensory modulation or sensorimotor mindfulness based self regulation skills for improved ability to stay in the present moment and distress tolerance.  4. Bernadine will report on symptoms and identify skills to use to manage depression.  5. Naomi will improve wellness related behaviors by getting enough sleep,exercise and balanced nutrition  6. Client will establish an aftercare plan to include medical providers and social supports by discharge.       Area of Treatment Focus:  Symptom Management, Personal Safety and Community Resources/Discharge Planning    Therapeutic Interventions/Treatment Strategies:  Support, Feedback, Safety Assessments, Problem Solving, Clarification, Education and Cognitive Behavioral Therapy    Response to Treatment Strategies:  Accepted Feedback, Listened, Alert     Name of Group: Psychotherapy Group  on 06/18/19 at 10:00-10:50 am     Number of Group Participants:  5     Description and Outcome:  Group discussion focused on relationship skills, specifically asserting boundaries and ways to express wants and needs with others. Bernadine shared that she had a positive weekend spending time with friends which is a part of her treatment " plan goals. She noted how she has worries about losing friendships due to history of interpersonal issues related to her depression. She noted that she is focusing on continuing to build and attend to the relationships in her life. Bernadine endorsed absence of suicidal ideation or self-injurious behavior urges. She can benefit from continuing to gain assistance in applying distress tolerance and interpersonal skills.     Lauryn Urbina, [facility], [facility]  6/18/2019             Is this a Weekly Review of the Progress on the Treatment Plan?  No.      Are Treatment Plan Goals being addressed?  Yes, continue treatment goals      Are Treatment Plan Strategies to Address Goals Effective?  Yes, continue treatment strategies      Are there any current contracts in place?  No

## 2019-06-19 ENCOUNTER — HOSPITAL ENCOUNTER (OUTPATIENT)
Dept: BEHAVIORAL HEALTH | Facility: CLINIC | Age: 44
End: 2019-06-19
Attending: PSYCHIATRY & NEUROLOGY
Payer: MEDICARE

## 2019-06-19 PROCEDURE — H2012 BEHAV HLTH DAY TREAT, PER HR: HCPCS

## 2019-06-20 NOTE — PROGRESS NOTES
"Adult Mental Health Outpatient Group Therapy Progress Note     Client Initial Individualized Goals for Treatment: \" Symptom management of depression\".    See Initial Treatment suggestions for the client during the time between Diagnostic Assessment and completion of the Master Individualized Treatment Plan.    Treatment Goals:  1. Client will notify staff when needing assistance to develop or implement a coping plan to manage suicidal or self injurious urges.Client will use coping plan for safety, as needed.  2. When in life skills group Naomi will practice strategies to help improve motivation so as to get done what she needs to have a more satisfying life routine and providing an update of progress weekly.  3. In group Naomi  will learn, practice, generalize 2 sensory modulation or sensorimotor mindfulness based self regulation skills for improved ability to stay in the present moment and distress tolerance.  4. Bernadine will report on symptoms and identify skills to use to manage depression.  5. Naomi will improve wellness related behaviors by getting enough sleep,exercise and balanced nutrition  6. Client will establish an aftercare plan to include medical providers and social supports by discharge.    Area of Treatment Focus:  Symptom Management    Therapeutic Interventions/Treatment Strategies:  Support, Feedback, Safety Assessments, Structured Activity and Education    Response to Treatment Strategies:  Accepted Feedback, Listened, Attentive, Accepted Support and Alert      Name of Group: Life  Skills(10:00-11:00)    Number of Group Participants: 5    Description and Outcome:  Client presented as calm and alert with good focus and concentration. Psychosocial skills addressed included a discussion related to communication styles with a focus on social risk taking. Goal #1 (no personal safety concerns reported or observed).  Goal #2 Client as given psychoeducation related to strategies to improve motivation and " decrease procrastination. 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  Client would benefit from additional opportunities to practice and implement content from this session  in every day life,relationships and mental health recovery.    Is this a Weekly Review of the Progress on the Treatment Plan?  Yes.      Are Treatment Plan Goals being addressed?  Yes, continue treatment goals      Are Treatment Plan Strategies to Address Goals Effective?  Yes, continue treatment strategies      Are there any current contracts in place?  No

## 2019-06-21 ENCOUNTER — HOSPITAL ENCOUNTER (OUTPATIENT)
Dept: BEHAVIORAL HEALTH | Facility: CLINIC | Age: 44
End: 2019-06-21
Attending: PSYCHIATRY & NEUROLOGY
Payer: MEDICARE

## 2019-06-21 PROCEDURE — H2012 BEHAV HLTH DAY TREAT, PER HR: HCPCS

## 2019-06-21 NOTE — PROGRESS NOTES
"Adult Mental Health Outpatient Group Therapy Progress Note     Client Initial Individualized Goals for Treatment: \" Symptom management of depression\".     Individualized Treatment Plan (ITP) Goals:  1. Client will notify staff when needing assistance to develop or implement a coping plan to manage suicidal or self injurious urges.Client will use coping plan for safety, as needed.  2. When in life skills group Naomi will practice strategies to help improve motivation so as to get done what she needs to have a more satisfying life routine and providing an update of progress weekly.  3. Bernadine will learn, practice, generalize 2 sensory modulation or sensorimotor mindfulness based self regulation skills for improved ability to stay in the present moment and distress tolerance.  4. Bernadine will report on symptoms and identify skills to use to manage depression.  5. Naomi will improve wellness related behaviors by getting enough sleep,exercise and balanced nutrition  6. Client will establish an aftercare plan to include medical providers and social supports by discharge.     Area of Treatment Focus:  Symptom Management and Develop / Improve Independent Living Skills    Therapeutic Interventions/Treatment Strategies:  Support, Feedback, Structured Activity, Problem Solving, Clarification and Education    Response to Treatment Strategies:  Accepted Feedback, Listened, Focused on Goals, Attentive, Accepted Support and Alert     Name of Group: Mental Health Management  Date: 6/14/19  Time: 9:00-9:50  Number of Group Participants: 4    Group Purpose and Description: Bernadine attended and participated in a structured psychoeducation group where skilled intervention provides written and verbal evidence based material, facilitates experiential learning creates opportunity to develop skills through practice, increases self awareness, and provides support in problem solving ways to apply and generalize taught skills. Through the use of " supported reflective social interactions and structured evidenced therapeutic processes in context, group members work towards stabilizing and managing mental health symptoms for improved participation and function in valued roles, routines, relationships, and independent living.    Session Topic: Discussed:  Sensory Approaches in Mental Health:  Sensory Enhanced Mindfulness:  Patients were provided education on interoception and how body awareness can provide important cues to improve self regulation. Focus on tuning into the body, mind, emotions followed by a facilitated experiential activity to support understanding.           Patient Session Goals / Objectives:    Delia how using sensory enhanced mindfulness practices focused on interoception can be used for guiding self regulation.      Improved self awareness around preferences    Established a plan for practice of these skills in their own environments    Practiced and reflected on how to generalize taught skills to their everyday life    Patient Participation / Response:  Patient presentation: Engaged, calm affect. Asks good questions. Supportive of peers.  Demonstrated understanding of topics discussed through group discussion and participation  Identified / Expressed personal readiness to explore sensory enhanced mindfulness activities to help with stress management, self care, health maintenance, and self regulation   Verbalized understanding of how the use of sensory enhanced mindfulness activities and strategies can assist with management of mental health symptoms and stressors  Practiced skills in session  Progress towards ITP goals: Bernadine worked towards ITP goal number 3 today.     Is this a Weekly Review of the Progress on the Treatment Plan?  No.

## 2019-06-21 NOTE — PROGRESS NOTES
"Adult Mental Health Outpatient Group Therapy Progress Note     Client Initial Individualized Goals for Treatment: \" Symptom management of depression\".     See Initial Treatment suggestions for the client during the time between Diagnostic Assessment and completion of the Master Individualized Treatment Plan.     Treatment Goals:  1. Client will notify staff when needing assistance to develop or implement a coping plan to manage suicidal or self injurious urges.Client will use coping plan for safety, as needed.  2. When in life skills group Naomi will practice strategies to help improve motivation so as to get done what she needs to have a more satisfying life routine and providing an update of progress weekly.  3. In group Naomi  will learn, practice, generalize 2 sensory modulation or sensorimotor mindfulness based self regulation skills for improved ability to stay in the present moment and distress tolerance.  4. Bernadine will report on symptoms and identify skills to use to manage depression.  5. Naomi will improve wellness related behaviors by getting enough sleep,exercise and balanced nutrition  6. Client will establish an aftercare plan to include medical providers and social supports by discharge.       Area of Treatment Focus:  Symptom Management, Personal Safety and Community Resources/Discharge Planning    Therapeutic Interventions/Treatment Strategies:  Support, Feedback, Safety Assessments, Problem Solving, Clarification, Education and Cognitive Behavioral Therapy    Response to Treatment Strategies:  Accepted Feedback, Listened, Alert     Name of Group: Psychotherapy Group 1 on 06/21/19 at 10:00-10:50 am     Number of Group Participants:  5     Description and Outcome:  Client presented today with a calm and brighter presentation.  Today she seemed to be in a better place and this has been more consistent.  She shared with the group that she had a lot of fun the other night when she went out to play " [place] and do a meet raffle with a friend.  She did not win anything but it was fun. One thing that has changed for this client is her willingness to go out more often with friends.  It seems to really be helping with her mood.  She reports daily suicidal ideation but it is passive. She has stayed safe during her time in the program.     Client demonstrated understanding of session content by participating verbally and then contributing in a appropriate way.     Client would benefit from practicing her new coping skills.     Is this a Weekly Review of the Progress on the Treatment Plan?  No.      Are Treatment Plan Goals being addressed?  Yes, continue treatment goals      Are Treatment Plan Strategies to Address Goals Effective?  Yes, continue treatment strategies      Are there any current contracts in place?  No

## 2019-06-21 NOTE — PROGRESS NOTES
"Adult Mental Health Outpatient Group Therapy Progress Note     Client Initial Individualized Goals for Treatment: \" Symptom management of depression\".     See Initial Treatment suggestions for the client during the time between Diagnostic Assessment and completion of the Master Individualized Treatment Plan.     Treatment Goals:  1. Client will notify staff when needing assistance to develop or implement a coping plan to manage suicidal or self injurious urges.Client will use coping plan for safety, as needed.  2. When in life skills group Naomi will practice strategies to help improve motivation so as to get done what she needs to have a more satisfying life routine and providing an update of progress weekly.  3. In group Naomi  will learn, practice, generalize 2 sensory modulation or sensorimotor mindfulness based self regulation skills for improved ability to stay in the present moment and distress tolerance.  4. Bernadine will report on symptoms and identify skills to use to manage depression.  5. Naomi will improve wellness related behaviors by getting enough sleep,exercise and balanced nutrition  6. Client will establish an aftercare plan to include medical providers and social supports by discharge.       Area of Treatment Focus:  Symptom Management, Personal Safety and Community Resources/Discharge Planning    Therapeutic Interventions/Treatment Strategies:  Support, Feedback, Safety Assessments, Problem Solving, Clarification, Education and Cognitive Behavioral Therapy    Response to Treatment Strategies:  Accepted Feedback, Listened, Alert     Name of Group: Psychotherapy Group 1 on 06/19/19 at 9:00-9:50 am     Number of Group Participants:  5     Description and Outcome:  Client presented today with a calm and brighter presentation.  Today she seemed to be in a better place and this has been more consistent.  She did tell the group that she does agree that she is starting to see an improved mood. She " thinks that it is the Wellbutrin and Effexor combination. Today she did state that even though her mood is improved her motivation and energy is not.  She continues to report that she has not been up to maintain a good sleep pattern for what seems to be forever. She is meeting with her doctor this week and is going to ask about starting something new to help with her sleep. She reports daily suicidal ideation but it is passive. She has stayed safe during her time in the program.     Client demonstrated understanding of session content by participating verbally and then contributing in a appropriate way.     Client would benefit from practicing her new coping skills.     Is this a Weekly Review of the Progress on the Treatment Plan?  Yes.      Are Treatment Plan Goals being addressed?  Yes, continue treatment goals      Are Treatment Plan Strategies to Address Goals Effective?  Yes, continue treatment strategies      Are there any current contracts in place?  No

## 2019-06-25 ENCOUNTER — HOSPITAL ENCOUNTER (OUTPATIENT)
Dept: BEHAVIORAL HEALTH | Facility: CLINIC | Age: 44
End: 2019-06-25
Attending: PSYCHIATRY & NEUROLOGY
Payer: MEDICARE

## 2019-06-25 PROCEDURE — H2012 BEHAV HLTH DAY TREAT, PER HR: HCPCS

## 2019-06-25 NOTE — PROGRESS NOTES
"Adult Mental Health Outpatient Group Therapy Progress Note     Client Initial Individualized Goals for Treatment: \" Symptom management of depression\".     Individualized Treatment Plan (ITP) Goals:  1. Client will notify staff when needing assistance to develop or implement a coping plan to manage suicidal or self injurious urges.Client will use coping plan for safety, as needed.  2. When in life skills group Naomi will practice strategies to help improve motivation so as to get done what she needs to have a more satisfying life routine and providing an update of progress weekly.  3. Bernadine will learn, practice, generalize 2 sensory modulation or sensorimotor mindfulness based self regulation skills for improved ability to stay in the present moment and distress tolerance.  4. Bernadine will report on symptoms and identify skills to use to manage depression.  5. Naomi will improve wellness related behaviors by getting enough sleep,exercise and balanced nutrition  6. Client will establish an aftercare plan to include medical providers and social supports by discharge.     Area of Treatment Focus:  Symptom Management and Develop / Improve Independent Living Skills    Therapeutic Interventions/Treatment Strategies:  Support, Feedback, Structured Activity, Problem Solving, Clarification and Education    Response to Treatment Strategies:  Accepted Feedback, Listened, Focused on Goals, Attentive, Accepted Support and Alert     Name of Group: Mental Health Management  Date: 6/21/19  Time: 9:00-9:50  Number of Group Participants: 4    Group Purpose and Description:   Bernadine attended and participated in a structured psychoeducation group where skilled intervention provides written and verbal evidence based material, facilitates experiential learning creates opportunity to develop skills through practice, increases self awareness, and provides support in problem solving ways to apply and generalize taught skills. Through the use " of supported reflective social interactions and structured evidenced therapeutic processes in context, group members work towards stabilizing and managing mental health symptoms for improved participation and function in valued roles, routines, relationships, and independent living.    Session Topic: Discussed:  Sensory Approaches in Mental Health:  Sensory Enhanced Mindfulness:  Patients were taught and provided with an opportunity to explore and practice how using sensory enhanced mindfulness practices can help them stay grounded in the present moment as a way to manage mental health symptoms and stressors.           Patient Session Goals / Objectives:    Identified how using sensory enhanced mindfulness practices can be used for grounding, stress management, and self regulation      Improved awareness of different types of sensory enhanced mindfulness activities that assist with healthy coping of stress and symptoms      Established a plan for practice of these skills in their own environments    Practiced skills in context and reflected on how to generalize taught skills to their everyday life    Patient Participation / Response:  Patient presentation: Engaged, calm affect. Reports improved mood. Good effort and results.  Demonstrated understanding of topics discussed through group discussion and participation  Identified / Expressed personal readiness to explore sensory enhanced mindfulness activities to help with stress management, self care, health maintenance, and self regulation   Verbalized understanding of how the use of sensory enhanced mindfulness activities and strategies can assist with management of mental health symptoms and stressors  Practiced skills in session  Progress towards ITP goals: Bernadine worked towards ITP goal number 3 today.     Is this a Weekly Review of the Progress on the Treatment Plan?  Yes.      Are Treatment Plan Goals being addressed?  Yes, continue treatment goals      Are  Treatment Plan Strategies to Address Goals Effective?  Yes, continue treatment strategies      Are there any current contracts in place?  No

## 2019-06-26 ENCOUNTER — HOSPITAL ENCOUNTER (OUTPATIENT)
Dept: BEHAVIORAL HEALTH | Facility: CLINIC | Age: 44
End: 2019-06-26
Attending: PSYCHIATRY & NEUROLOGY
Payer: MEDICARE

## 2019-06-26 PROCEDURE — H2012 BEHAV HLTH DAY TREAT, PER HR: HCPCS

## 2019-06-26 NOTE — PROGRESS NOTES
"Adult Mental Health Outpatient Group Therapy Progress Note     Client Initial Individualized Goals for Treatment: \" Symptom management of depression\".     See Initial Treatment suggestions for the client during the time between Diagnostic Assessment and completion of the Master Individualized Treatment Plan.     Treatment Goals:  1. Client will notify staff when needing assistance to develop or implement a coping plan to manage suicidal or self injurious urges.Client will use coping plan for safety, as needed.  2. When in life skills group Naomi will practice strategies to help improve motivation so as to get done what she needs to have a more satisfying life routine and providing an update of progress weekly.  3. In group Naomi  will learn, practice, generalize 2 sensory modulation or sensorimotor mindfulness based self regulation skills for improved ability to stay in the present moment and distress tolerance.  4. Bernadine will report on symptoms and identify skills to use to manage depression.  5. Naomi will improve wellness related behaviors by getting enough sleep,exercise and balanced nutrition  6. Client will establish an aftercare plan to include medical providers and social supports by discharge.       Area of Treatment Focus:  Symptom Management, Personal Safety and Community Resources/Discharge Planning    Therapeutic Interventions/Treatment Strategies:  Support, Feedback, Safety Assessments, Problem Solving, Clarification, Education and Cognitive Behavioral Therapy    Response to Treatment Strategies:  Accepted Feedback, Listened, Alert     Name of Group: Psychotherapy Group 1 on 06/26/19 at 10:00-10:50 am     Number of Group Participants:  3     Description and Outcome:  Client presented today with a calm and brighter presentation.  Today she seemed to be in a better place and this has been more consistent.  She shared with the group that she was feeling bugged by something and wanted to talk about it.  " Yesterday she and another group member had to merge with another group with adults who also have depression and anxiety.  There was one person who took up the whole group.  This really bugged her a lot.  She is frustrated with people who don't seem to have much insight.  She feels bad about this but she does have higher expectations than others.  When she first starts the program she has no awareness or insight into her own resistance and this writer gave her feedback about this dynamic. She reports daily suicidal ideation but it is passive. She has stayed safe during her time in the program.     Client demonstrated understanding of session content by participating verbally and then contributing in a appropriate way.     Client would benefit from practicing her new coping skills.     Is this a Weekly Review of the Progress on the Treatment Plan?  No.      Are Treatment Plan Goals being addressed?  Yes, continue treatment goals      Are Treatment Plan Strategies to Address Goals Effective?  Yes, continue treatment strategies      Are there any current contracts in place?  No

## 2019-06-26 NOTE — PROGRESS NOTES
"Adult Mental Health Outpatient Group Therapy Progress Note     Client Initial Individualized Goals for Treatment: \" Symptom management of depression\".    See Initial Treatment suggestions for the client during the time between Diagnostic Assessment and completion of the Master Individualized Treatment Plan.    Treatment Goals:  1. Client will notify staff when needing assistance to develop or implement a coping plan to manage suicidal or self injurious urges.Client will use coping plan for safety, as needed.  2. When in life skills group Naomi will practice strategies to help improve motivation so as to get done what she needs to have a more satisfying life routine and providing an update of progress weekly.  3. In group Naomi  will learn, practice, generalize 2 sensory modulation or sensorimotor mindfulness based self regulation skills for improved ability to stay in the present moment and distress tolerance.  4. Bernadine will report on symptoms and identify skills to use to manage depression.  5. Naomi will improve wellness related behaviors by getting enough sleep,exercise and balanced nutrition  6. Client will establish an aftercare plan to include medical providers and social supports by discharge.    Area of Treatment Focus:  Symptom Management    Therapeutic Interventions/Treatment Strategies:  Support, Feedback, Safety Assessments, Structured Activity and Education    Response to Treatment Strategies:  Accepted Feedback, Listened, Attentive, Accepted Support and Alert      Name of Group: Life  Skills(9:00-10:00)    Number of Group Participants: 7    Description and Outcome:  Client presented as calm and alert with good focus and concentration. Psychosocial skills addressed included a discussion related to lifestyle balance with a focus on personal change. Goal #1 (no personal safety concerns reported or observed).   Client would benefit from additional opportunities to practice and implement content from " this session  in every day life,relationships and mental health recovery.    Is this a Weekly Review of the Progress on the Treatment Plan?  Yes.      Are Treatment Plan Goals being addressed?  Yes, continue treatment goals      Are Treatment Plan Strategies to Address Goals Effective?  Yes, continue treatment strategies      Are there any current contracts in place?  No

## 2019-06-26 NOTE — PROGRESS NOTES
"Adult Mental Health Outpatient Group Therapy Progress Note     Client Initial Individualized Goals for Treatment: \" Symptom management of depression\".     See Initial Treatment suggestions for the client during the time between Diagnostic Assessment and completion of the Master Individualized Treatment Plan.     Treatment Goals:  1. Client will notify staff when needing assistance to develop or implement a coping plan to manage suicidal or self injurious urges.Client will use coping plan for safety, as needed.  2. When in life skills group Naomi will practice strategies to help improve motivation so as to get done what she needs to have a more satisfying life routine and providing an update of progress weekly.  3. In group Naomi  will learn, practice, generalize 2 sensory modulation or sensorimotor mindfulness based self regulation skills for improved ability to stay in the present moment and distress tolerance.  4. Bernadine will report on symptoms and identify skills to use to manage depression.  5. Naomi will improve wellness related behaviors by getting enough sleep,exercise and balanced nutrition  6. Client will establish an aftercare plan to include medical providers and social supports by discharge.       Area of Treatment Focus:  Symptom Management, Personal Safety and Community Resources/Discharge Planning    Therapeutic Interventions/Treatment Strategies:  Support, Feedback, Safety Assessments, Problem Solving, Clarification, Education and Cognitive Behavioral Therapy    Response to Treatment Strategies:  Accepted Feedback, Listened, Alert     Name of Group: Psychotherapy Group II on 06/25/19 at 10:00-10:50 am     Number of Group Participants:  8     Description and Outcome:  Group Psychotherapy II: Writer facilitated a discussion on coping skills to use when distressed.  Group members identified skills they use including spending time outdoors, going to a funny movie, getting out of bed and going to a " coffee shop, and listening to music.  Client shared skills that work for them and also shared skills to challenge negative self-talk.     Client demonstrated understanding of session content by participating verbally and then contributing in a appropriate way.     Is this a Weekly Review of the Progress on the Treatment Plan?  No

## 2019-06-26 NOTE — PROGRESS NOTES
"Adult Mental Health Outpatient Group Therapy Progress Note     Client Initial Individualized Goals for Treatment: \" Symptom management of depression\".     See Initial Treatment suggestions for the client during the time between Diagnostic Assessment and completion of the Master Individualized Treatment Plan.     Treatment Goals:  1. Client will notify staff when needing assistance to develop or implement a coping plan to manage suicidal or self injurious urges.Client will use coping plan for safety, as needed.  2. When in life skills group Naomi will practice strategies to help improve motivation so as to get done what she needs to have a more satisfying life routine and providing an update of progress weekly.  3. In group Naomi  will learn, practice, generalize 2 sensory modulation or sensorimotor mindfulness based self regulation skills for improved ability to stay in the present moment and distress tolerance.  4. Bernadine will report on symptoms and identify skills to use to manage depression.  5. Naomi will improve wellness related behaviors by getting enough sleep,exercise and balanced nutrition  6. Client will establish an aftercare plan to include medical providers and social supports by discharge.       Area of Treatment Focus:  Symptom Management, Personal Safety and Community Resources/Discharge Planning    Therapeutic Interventions/Treatment Strategies:  Support, Feedback, Safety Assessments, Problem Solving, Clarification, Education and Cognitive Behavioral Therapy    Response to Treatment Strategies:  Accepted Feedback, Listened, Alert     Name of Group: Psychotherapy Group 1 on 06/25/19 at 9:00-9:50 am     Number of Group Participants:  2 (she and another client had to merge with the 5A's)     Description and Outcome:  Client presented today with a calm and brighter presentation.  Today she seemed to be in a better place and this has been more consistent.  She shared with the group that she had a lot " over the weekend.  She did not attend [employer] because she had to work.  On Sunday night though she went out with a friend for a later happy hour and had a good time.  She also attended a [employer] service which has been very helpful for her. One good thing that has changed for this client is her willingness to go out more often with friends.  It seems to really be helping with her mood.  She reports daily suicidal ideation but it is passive. She has stayed safe during her time in the program.     Client demonstrated understanding of session content by participating verbally and then contributing in a appropriate way.     Client would benefit from practicing her new coping skills.     Is this a Weekly Review of the Progress on the Treatment Plan?  Yes.      Are Treatment Plan Goals being addressed?  Yes, continue treatment goals      Are Treatment Plan Strategies to Address Goals Effective?  Yes, continue treatment strategies      Are there any current contracts in place?  No

## 2019-06-28 ENCOUNTER — HOSPITAL ENCOUNTER (OUTPATIENT)
Dept: BEHAVIORAL HEALTH | Facility: CLINIC | Age: 44
End: 2019-06-28
Attending: PSYCHIATRY & NEUROLOGY
Payer: MEDICARE

## 2019-06-28 PROCEDURE — H2012 BEHAV HLTH DAY TREAT, PER HR: HCPCS

## 2019-06-28 NOTE — PROGRESS NOTES
"Adult Mental Health Outpatient Group Therapy Progress Note     Client Initial Individualized Goals for Treatment: \" Symptom management of depression\".    See Initial Treatment suggestions for the client during the time between Diagnostic Assessment and completion of the Master Individualized Treatment Plan.    Treatment Goals:  1. Client will notify staff when needing assistance to develop or implement a coping plan to manage suicidal or self injurious urges.Client will use coping plan for safety, as needed.  2. When in life skills group Naomi will practice strategies to help improve motivation so as to get done what she needs to have a more satisfying life routine and providing an update of progress weekly.  3. In group Naomi  will learn, practice, generalize 2 sensory modulation or sensorimotor mindfulness based self regulation skills for improved ability to stay in the present moment and distress tolerance.  4. Bernadine will report on symptoms and identify skills to use to manage depression.  5. Naomi will improve wellness related behaviors by getting enough sleep,exercise and balanced nutrition  6. Client will establish an aftercare plan to include medical providers and social supports by discharge.    Area of Treatment Focus:  Symptom Management    Therapeutic Interventions/Treatment Strategies:  Support, Feedback, Safety Assessments, Structured Activity and Education    Response to Treatment Strategies:  Accepted Feedback, Listened, Attentive, Accepted Support and Alert      Name of Group: Life  Skills(11:00-12:00)    Number of Group Participants: 3    Description and Outcome:  Client presented as calm and alert with good focus and concentration. Psychosocial skills addressed included a discussion related to time and .  Goal #1 (no personal safety concerns reported or observed). Goal #6 Client does not feel ready to leave this program although her mood has been quite stable for the past " month.She likes her part-time job but lacks structure and routine in the morning. That is why she likes coming here in the morning three days per week. She does not seem to like the idea of volunteering to provide some structure in the morning.She will have reached the 12 week atiya in the program next week.  Client would benefit from additional opportunities to practice and implement content from this session  in every day life,relationships and mental health recovery.    Is this a Weekly Review of the Progress on the Treatment Plan?  Yes.      Are Treatment Plan Goals being addressed?  Yes, continue treatment goals      Are Treatment Plan Strategies to Address Goals Effective?  Yes, continue treatment strategies      Are there any current contracts in place?  No

## 2019-07-02 ENCOUNTER — HOSPITAL ENCOUNTER (OUTPATIENT)
Dept: BEHAVIORAL HEALTH | Facility: CLINIC | Age: 44
End: 2019-07-02
Attending: PSYCHIATRY & NEUROLOGY
Payer: MEDICARE

## 2019-07-02 PROCEDURE — H2012 BEHAV HLTH DAY TREAT, PER HR: HCPCS

## 2019-07-02 NOTE — PROGRESS NOTES
Group Therapy Progress Notes     Area of Treatment Focus:  Symptom Management, Abstinence/Relapse Prevention and Cultural / Spirituality    Therapeutic Interventions/Treatment Strategies:  Support, Feedback, Clarification and Education    Response to Treatment Strategies:  Accepted Feedback, Gave Feedback, Listened, Focused on Goals, Attentive, Accepted Support and Alert    Name of Group:  Psychotherapy  Group 2     2703-7683 AM     5 group members attending    Progress Note  In this group, Bernadine participated in a discussion of a handout on the topic of using an acronym, IMPROVE, to help one cope with distress in the moment. Bernadine focused on two aspects of the exercise. She said that she feels angry about the many negative things that have happened to her in her life so far. She asks why but there is never an answer and it doesn't seem like there is any use to her suffering. She says she can't find any good in it. She also talked about having difficulty with the idea of praying as she doesn't have a set of beliefs that she can turn to in prayer. She does not connect with the idea of a higher power. She feels envious of those who do have a negro but she does not have this comfort.     Client demonstrates understanding of the topic by participating in the discussion and sharing her responses to the ideas/suggestions in the handout.           Is this a Weekly Review of the Progress on the Treatment Plan?  No

## 2019-07-03 ENCOUNTER — HOSPITAL ENCOUNTER (OUTPATIENT)
Dept: BEHAVIORAL HEALTH | Facility: CLINIC | Age: 44
End: 2019-07-03
Attending: PSYCHIATRY & NEUROLOGY
Payer: MEDICARE

## 2019-07-03 PROCEDURE — H2012 BEHAV HLTH DAY TREAT, PER HR: HCPCS

## 2019-07-03 NOTE — PROGRESS NOTES
Psychiatry staffing: case discussed  Diagnosis: Bipolar. Borderline PD. Alcohol use d/o.   Doing well.     Current Outpatient Medications   Medication     BuPROPion HCl (WELLBUTRIN PO)     etonogestrel-ethinyl estradiol (NUVARING) 0.12-0.015 MG/24HR vaginal ring     lamoTRIgine (LAMICTAL) 150 MG tablet     levothyroxine (SYNTHROID/LEVOTHROID) 50 MCG tablet     norethindrone-ethinyl estradiol (MICROGESTIN 1.5/30) 1.5-30 MG-MCG per tablet     TRAZODONE HCL PO     Vilazodone HCl (VIIBRYD PO)     No current facility-administered medications for this encounter.      Past Medical History:   Diagnosis Date     Depression     In counseling; has Psychiatrist     Depressive disorder

## 2019-07-03 NOTE — PROGRESS NOTES
Adult Mental Health Day Treatment  TRACK: 2B    PATIENT'S NAME: Bernadine Samaniego  MRN:   [id number]  :   1975  ACCT. NUMBER: [id number]  DATE OF SERVICE: 19  START TIME: 1000  END TIME: 1100  NUMBER OF PARTICIPANTS: 6      Summary of Group / Topics Discussed:  Life Skills: Routines,roles and habits with a focus on physical fitness    Patient Session Goals / Objectives:  When in life skills group Naomi will practice strategies to help improve motivation so as to get done what she needs to have a more satisfying life routine and providing an update of progress weekly.  Improve motivation,decrease procrastination and use personal time more effectively.    Patient Participation / Response:  Fully participated with the group by sharing personal reflections / insights and openly received / provided feedback with other participants.    Patient presentation: Calm,alert and focused.Mood stable., Verbalized understanding of content and Patient would benefit from additional opportunities to practice the content to be able to generalize it to their everyday life with increased intentionality, consistency, and efficacy in support of their psychiatric recovery    Treatment Plan:  Patient has a current master individualized treatment plan.  See Epic treatment plan for more information.          William Lake, OTR/L

## 2019-07-03 NOTE — PROGRESS NOTES
Adult Mental Health Day Treatment  TRACK: 2B    PATIENT'S NAME: Bernadine Samaniego  MRN:   [id number]  :   1975  ACCT. NUMBER: [id number]  DATE OF SERVICE: 19  START TIME: 0900  END TIME: 1000  NUMBER OF PARTICIPANTS: 7      Summary of Group / Topics Discussed:  Life Skills: Exercises for stress reduction    Patient Session Goals / Objectives:  When in life skills group Naomi will practice strategies to help improve motivation so as to get done what she needs to have a more satisfying life routine and providing an update of progress weekly.  Improve motivation,decrease procrastination and use personal time more effectively.    Patient Participation / Response:  Fully participated with the group by sharing personal reflections / insights and openly received / provided feedback with other participants.    Patient presentation: Calm,alert and focused. Mood stable., Verbalized understanding of content and Patient would benefit from additional opportunities to practice the content to be able to generalize it to their everyday life with increased intentionality, consistency, and efficacy in support of their psychiatric recovery    Treatment Plan:  Patient has a current master individualized treatment plan.  See Epic treatment plan for more information.          William Lake, OTR/L

## 2019-07-03 NOTE — PROGRESS NOTES
"Adult Mental Health Outpatient Group Therapy Progress Note     Client Initial Individualized Goals for Treatment: \" Symptom management of depression\".     See Initial Treatment suggestions for the client during the time between Diagnostic Assessment and completion of the Master Individualized Treatment Plan.     Treatment Goals:  1. Client will notify staff when needing assistance to develop or implement a coping plan to manage suicidal or self injurious urges.Client will use coping plan for safety, as needed.  2. When in life skills group Naomi will practice strategies to help improve motivation so as to get done what she needs to have a more satisfying life routine and providing an update of progress weekly.  3. In group Naomi  will learn, practice, generalize 2 sensory modulation or sensorimotor mindfulness based self regulation skills for improved ability to stay in the present moment and distress tolerance.  4. Bernadine will report on symptoms and identify skills to use to manage depression.  5. Naomi will improve wellness related behaviors by getting enough sleep,exercise and balanced nutrition  6. Client will establish an aftercare plan to include medical providers and social supports by discharge.       Area of Treatment Focus:  Symptom Management, Personal Safety and Community Resources/Discharge Planning    Therapeutic Interventions/Treatment Strategies:  Support, Feedback, Safety Assessments, Problem Solving, Clarification, Education and Cognitive Behavioral Therapy    Response to Treatment Strategies:  Accepted Feedback, Listened, Alert     Name of Group: Psychotherapy Group [id number]   Number of Group Participants:  3     Description and Outcome:  Naomi endorsed absence of suicidal ideation and self-injurious behavior urges. She described that she continues to feel like she is not \"doing what [she] is supposed to\" despite stating to the group ways she is making progress towards her goals. Writer and " group members highlighted and reinforced her plan to meet with a  next week, go to [facility], and work with her individual therapist on reducing alcohol use. Naomi was receptive to feedback. Naomi also noted that she is excited to spend time with family that is visiting this week. Naomi participated actively and offered feedback to peers. She can benefit from continuing to practicec self-regulation skills as well as working towards employment goals.       Is this a Weekly Review of the Progress on the Treatment Plan?  No.

## 2019-07-09 ENCOUNTER — HOSPITAL ENCOUNTER (OUTPATIENT)
Dept: BEHAVIORAL HEALTH | Facility: CLINIC | Age: 44
End: 2019-07-09
Attending: PSYCHIATRY & NEUROLOGY
Payer: MEDICARE

## 2019-07-09 PROCEDURE — H2012 BEHAV HLTH DAY TREAT, PER HR: HCPCS

## 2019-07-09 NOTE — PROGRESS NOTES
Adult Mental Health Day Treatment  TRACK: 2B    PATIENT'S NAME: Bernadine Samaniego  MRN:   [id number]  :   1975  ACCT. NUMBER: [id number]  DATE OF SERVICE: 19  START TIME: 1100  END TIME: 1150  NUMBER OF PARTICIPANTS: 6      Summary of Group / Topics Discussed:  Coping Skills: Grounding: Patients discussed and practiced strategies to increase attachment / presence to the current moment.  Patients identified situations in which using these strategies will help improve emotion regulation sense of calm in the body.  Reviewed the benefits of applying grounding strategies, as well as past / current practices of each member.  Patients identified situations in which using these strategies would reduce stress. They developed the ability to distinguish when these strategies can be useful in their lives for management and stress and psychological well-being.    Patient Session Goals / Objectives:    Understand the purpose of using grounding strategies to reduce stress.    Verbalize understanding of how and when to apply grounding strategies to reduce distress and increase presence in the moment.    Review patients current grounding practices and discuss a more formal way of practicing and accessing skills.    Practice using various calming strategies (e.g. 5-4-3-2-1; mental and body awareness).    Choose 1-2 grounding strategies to apply during times of distress.    Patient Participation / Response:  Fully participated with the group by sharing personal reflections / insights and openly received / provided feedback with other participants.    Demonstrated understanding of topics discussed through group discussion and participation    Treatment Plan:  Patient has a current master individualized treatment plan.  See Epic treatment plan for more information.        Mary Ellen Weaver [facility] HospitalSW

## 2019-07-09 NOTE — PROGRESS NOTES
"  Adult Mental Health Outpatient Group Therapy Progress Note       Client Initial Individualized Goals for Treatment: \" Symptom management of depression\".     See Initial Treatment suggestions for the client during the time between Diagnostic Assessment and completion of the Master Individualized Treatment Plan.     Treatment Goals:  1. Client will notify staff when needing assistance to develop or implement a coping plan to manage suicidal or self injurious urges.Client will use coping plan for safety, as needed.  2. When in life skills group Naomi will practice strategies to help improve motivation so as to get done what she needs to have a more satisfying life routine and providing an update of progress weekly.  3. In group Naomi  will learn, practice, generalize 2 sensory modulation or sensorimotor mindfulness based self regulation skills for improved ability to stay in the present moment and distress tolerance.  4. Bernadine will report on symptoms and identify skills to use to manage depression.  5. Naomi will improve wellness related behaviors by getting enough sleep,exercise and balanced nutrition  6. Client will establish an aftercare plan to include medical providers and social supports by discharge.       Area of Treatment Focus:  Symptom Management and Personal Safety    Therapeutic Interventions/Treatment Strategies:  Support, Feedback, Safety Assessments and Clarification    Response to Treatment Strategies:  Accepted Feedback, Gave Feedback, Listened, Focused on Goals, Attentive and Alert    Name of Group:  Group Psychotherapy [id number]    Group Participants: 7    Description and Outcome:  Bernadine reported stressful weekend with family.  She felt hurt that cousins who were visiting did not express interest in her.  She also identified feeling hurt that mother did not ask her to speak at step father's birthday party.  Bernadine expressed feeling shame as well that she has not been able to pursue career of " choice.  Bernadine was accepting of peers' validation.   Bernadine denied any personal safety concerns and was supportive of the other group members.    Client demonstrated understanding of session content by accepting validation, when prompted..    Client would benefit from additional opportunities to practice and implement content from this session.    Is this a Weekly Review of the Progress on the Treatment Plan?  No

## 2019-07-09 NOTE — PROGRESS NOTES
Adult Mental Health Day Treatment  TRACK: 2B    PATIENT'S NAME: Bernadine Samaniego  MRN:   [id number]  :   1975  ACCT. NUMBER: [id number]  DATE OF SERVICE: 19  START TIME: 9:00  END TIME: 9:50  NUMBER OF PARTICIPANTS: 3      Summary of Group / Topics Discussed:  Mental Health Management: Sensory Enhanced Mindfulness: Patients were provided with written and verbal psychoeducation on the concept of integrating mindfulness with a bottom up, sensory rich, experiential intervention activities to develop self-awareness and skills for self-regulation.  Emphasis placed on the benefits of mindfulness through bottom up (body based) activities and how they can help to emotionally ground oneself or provide a healthy distraction to self-regulate when distressed. Patients worked to increase knowledge and skills during a guided skilled structured sensory enhanced activity: active meditation practices can build resiliency and self-efficacy. Facilitation of reflection to reinforce taught concepts was provided.     Patient Session Goals / Objectives:    Identified how using sensory enhanced mindfulness practices can be used for grounding, stress management, and self-regulation      Improved awareness of different types of sensory enhanced mindfulness activities that assist with healthy coping of stress and symptoms      Established a plan for practice of these skills in their own environments    Practiced and reflected on how to generalize taught skills to their everyday life    Patient Participation / Response:  Fully participated with the group by sharing personal reflections / insights and openly received / provided feedback with other participants.    Patient presentation: Engaged, good effort, reports calming impact of session, Verbalized understanding of content and Patient would benefit from additional opportunities to practice the content to be able to generalize it to their everyday life with increased intentionality,  consistency, and efficacy in support of their psychiatric recovery    Treatment Plan:  Patient has a current master individualized treatment plan.  See Epic treatment plan for more information.          Andrew Osborn, OT

## 2019-07-09 NOTE — PROGRESS NOTES
"  Adult Mental Health Outpatient Group Therapy Progress Note     Client Initial Individualized Goals for Treatment: \" Symptom management of depression\".     See Initial Treatment suggestions for the client during the time between Diagnostic Assessment and completion of the Master Individualized Treatment Plan.     Treatment Goals:  1. Client will notify staff when needing assistance to develop or implement a coping plan to manage suicidal or self injurious urges.Client will use coping plan for safety, as needed.  2. When in life skills group Naomi will practice strategies to help improve motivation so as to get done what she needs to have a more satisfying life routine and providing an update of progress weekly.  3. In group Naomi  will learn, practice, generalize 2 sensory modulation or sensorimotor mindfulness based self regulation skills for improved ability to stay in the present moment and distress tolerance.  4. Bernadine will report on symptoms and identify skills to use to manage depression.  5. Naomi will improve wellness related behaviors by getting enough sleep,exercise and balanced nutrition  6. Client will establish an aftercare plan to include medical providers and social supports by discharge.       Area of Treatment Focus:  Symptom Management    Therapeutic Interventions/Treatment Strategies:  Support, Feedback, Structured Activity, Clarification and Education    Response to Treatment Strategies:  Accepted Feedback, Gave Feedback, Listened, Focused on Goals, Attentive, Accepted Support and Alert    Name of Group:  Mental health management [id number] , 7 participants    Description and Outcome:  The group was provided with information regarding the importance/value/meaning of tending to the body.  The body informs our selves as to our needs and experiences and plays a crucial role in mindfulness.  The group discussed what it would mean to live a \"bodyful\" life and discussed barriers. Before moving the " group inquiries were made regarding limitations and the group was told to listen to physical limitations.   The group was provided with breathing and movement structure in which to explore their experience of their body and body in space.  The group focused on the use of strength to explore grounding and self confidence.  Client demonstrated understanding of session by participating in experiential and contributing to discussion.    Is this a Weekly Review of the Progress on the Treatment Plan?  No

## 2019-07-10 ENCOUNTER — HOSPITAL ENCOUNTER (OUTPATIENT)
Dept: BEHAVIORAL HEALTH | Facility: CLINIC | Age: 44
End: 2019-07-10
Attending: PSYCHIATRY & NEUROLOGY
Payer: MEDICARE

## 2019-07-10 PROCEDURE — 90853 GROUP PSYCHOTHERAPY: CPT

## 2019-07-10 PROCEDURE — G0177 OPPS/PHP; TRAIN & EDUC SERV: HCPCS

## 2019-07-10 NOTE — PROGRESS NOTES
"  Adult Mental Health Outpatient Group Therapy Progress Note       Client Initial Individualized Goals for Treatment: \" Symptom management of depression\".     See Initial Treatment suggestions for the client during the time between Diagnostic Assessment and completion of the Master Individualized Treatment Plan.     Treatment Goals:  1. Client will notify staff when needing assistance to develop or implement a coping plan to manage suicidal or self injurious urges.Client will use coping plan for safety, as needed.  2. When in life skills group Naomi will practice strategies to help improve motivation so as to get done what she needs to have a more satisfying life routine and providing an update of progress weekly.  3. In group Naomi  will learn, practice, generalize 2 sensory modulation or sensorimotor mindfulness based self regulation skills for improved ability to stay in the present moment and distress tolerance.  4. Bernadine will report on symptoms and identify skills to use to manage depression.  5. Naomi will improve wellness related behaviors by getting enough sleep,exercise and balanced nutrition  6. Client will establish an aftercare plan to include medical providers and social supports by discharge.       Area of Treatment Focus:  Symptom Management and Personal Safety    Therapeutic Interventions/Treatment Strategies:  Support, Feedback, Safety Assessments and Clarification    Response to Treatment Strategies:  Accepted Feedback, Gave Feedback, Listened, Attentive and Alert    Name of Group:  Group Psychotherapy [id number]    Group Participants: 6    Description and Outcome:  Bernadine declined to take  Time today.  She reports slight improvement in mood and symptoms.  Bernadine was actively engaged with her support of peers particicularly around social supports.  Bernadine denied any personal safety concerns and was supportive of the other group members.    Client demonstrated understanding of session content by " sharing examples from personal experience..    Client would benefit from additional opportunities to practice and implement content from this session.    Is this a Weekly Review of the Progress on the Treatment Plan?  No

## 2019-07-10 NOTE — PROGRESS NOTES
Adult Mental Health Day Treatment  TRACK: 2B    PATIENT'S NAME: Bernadine Samaniego  MRN:   [id number]  :   1975  ACCT. NUMBER: [id number]  DATE OF SERVICE: 7/10/19  START TIME: 1100  END TIME: 1150  NUMBER OF PARTICIPANTS: 6      Summary of Group / Topics Discussed:  Coping Skills: Grounding: Patients discussed and practiced strategies to increase attachment / presence to the current moment.  Patients identified situations in which using these strategies will help improve emotion regulation sense of calm in the body.  Reviewed the benefits of applying grounding strategies, as well as past / current practices of each member.  Patients identified situations in which using these strategies would reduce stress. They developed the ability to distinguish when these strategies can be useful in their lives for management and stress and psychological well-being.    Patient Session Goals / Objectives:    Understand the purpose of using grounding strategies to reduce stress.    Verbalize understanding of how and when to apply grounding strategies to reduce distress and increase presence in the moment.    Review patients current grounding practices and discuss a more formal way of practicing and accessing skills.    Practice using various calming strategies (e.g. 5-4-3-2-1; mental and body awareness).    Choose 1-2 grounding strategies to apply during times of distress.    Patient Participation / Response:  Fully participated with the group by sharing personal reflections / insights and openly received / provided feedback with other participants.    Demonstrated understanding of topics discussed through group discussion and participation    Treatment Plan:  Patient has a current master individualized treatment plan.  See Epic treatment plan for more information.        aMry Ellen Weaver [facility]

## 2019-07-16 ENCOUNTER — HOSPITAL ENCOUNTER (OUTPATIENT)
Dept: BEHAVIORAL HEALTH | Facility: CLINIC | Age: 44
End: 2019-07-16
Attending: PSYCHIATRY & NEUROLOGY
Payer: MEDICARE

## 2019-07-16 NOTE — PROGRESS NOTES
Past Medical History:   Diagnosis Date     Depression     In counseling; has Psychiatrist     Depressive disorder        Current Outpatient Medications:      BuPROPion HCl (WELLBUTRIN PO), Take 3 mg by mouth daily, Disp: , Rfl:      etonogestrel-ethinyl estradiol (NUVARING) 0.12-0.015 MG/24HR vaginal ring, Place 1 ring every 21 days then remove for 1 week., Disp: 3 each, Rfl: 3     lamoTRIgine (LAMICTAL) 150 MG tablet, Take 150 mg by mouth, Disp: , Rfl:      levothyroxine (SYNTHROID/LEVOTHROID) 50 MCG tablet, Take 50 mcg by mouth, Disp: , Rfl:      norethindrone-ethinyl estradiol (MICROGESTIN 1.5/30) 1.5-30 MG-MCG per tablet, Take 1 tablet by mouth daily, Disp: , Rfl:      TRAZODONE HCL PO, Take 100 mg by mouth At Bedtime, Disp: , Rfl:      Vilazodone HCl (VIIBRYD PO), Take 20 mg by mouth, Disp: , Rfl:   Current Outpatient Medications   Medication     BuPROPion HCl (WELLBUTRIN PO)     etonogestrel-ethinyl estradiol (NUVARING) 0.12-0.015 MG/24HR vaginal ring     lamoTRIgine (LAMICTAL) 150 MG tablet     levothyroxine (SYNTHROID/LEVOTHROID) 50 MCG tablet     norethindrone-ethinyl estradiol (MICROGESTIN 1.5/30) 1.5-30 MG-MCG per tablet     TRAZODONE HCL PO     Vilazodone HCl (VIIBRYD PO)     No current facility-administered medications for this encounter.      Psychiatry staffing: case discussed  Diagnosis:  MDD, improved.

## 2019-07-16 NOTE — PROGRESS NOTES
"  Adult Mental Health Outpatient Group Therapy Progress Note       Client Initial Individualized Goals for Treatment: \" Symptom management of depression\".     See Initial Treatment suggestions for the client during the time between Diagnostic Assessment and completion of the Master Individualized Treatment Plan.     Treatment Goals:  1. Client will notify staff when needing assistance to develop or implement a coping plan to manage suicidal or self injurious urges.Client will use coping plan for safety, as needed.  2. When in life skills group Naomi will practice strategies to help improve motivation so as to get done what she needs to have a more satisfying life routine and providing an update of progress weekly.  3. In group Naomi  will learn, practice, generalize 2 sensory modulation or sensorimotor mindfulness based self regulation skills for improved ability to stay in the present moment and distress tolerance.  4. Bernadine will report on symptoms and identify skills to use to manage depression.  5. Naomi will improve wellness related behaviors by getting enough sleep,exercise and balanced nutrition  6. Client will establish an aftercare plan to include medical providers and social supports by discharge.       Area of Treatment Focus:  Symptom Management and Personal Safety    Therapeutic Interventions/Treatment Strategies:  Support, Feedback, Safety Assessments and Clarification    Response to Treatment Strategies:  Accepted Feedback, Listened, Attentive, Accepted Support and Alert    Name of Group:  Group Psychotherapy 9:00 AM - 9:50 AM    Group Participants: 8    Description and Outcome:  Bernadine shared conflict with friend that occurred last week.  She states that her friend Raji became upset with another friend during a game.  He was later upset that Bernadine went out with that friend for pizza.  Bernadine states that this is a new relationship.  She acknowledges tendency to trust to easily and accepted feedback " regarding building trust.  Bernadine also acknowledges sense of loss with 2B therapist leaving and possible dissolution of group.  Bernadine denied any personal safety concerns and was supportive of the other group members.    Client demonstrated understanding of session content by accepting feedback and sharing concerns..    Client would benefit from additional opportunities to practice and implement content from this session.    Is this a Weekly Review of the Progress on the Treatment Plan?  No

## 2019-07-16 NOTE — PROGRESS NOTES
Adult Mental Health Day Treatment  TRACK: 2B    PATIENT'S NAME: Bernadine Samaniego  MRN:   [id number]  :   1975  ACCT. NUMBER: [id number]  DATE OF SERVICE: 7/10/19  START TIME: 9:00  END TIME: 9:50  NUMBER OF PARTICIPANTS: 5      Summary of Group / Topics Discussed:  Cognitive Functioning:Cognitive Functioning: Patients were taught and provided with an opportunity to gain awareness of how their mental health symptoms impact their current cognitive functioning as well as how this impacts their performance and participation in meaningful roles, relationships, and routines.  Patients were taught skills and strategies on how to monitor and improve cognitive performance through remediation or compensatory strategies.  Patients were given opportunities to practice taught skills and techniques in session and how to apply to everyday life.        Patient Session Goals / Objectives:    Identified how their mental health symptoms impact their functioning, focusing on specific cognitive challenges     Improved awareness of specific remediation and/or compensatory strategies to improve  executive functioning skills and how this relates to mental health recovery        Established a plan for practice of these skills in their own environments    Practiced and reflected on how to generalize taught skills to their everyday life    Patient Participation / Response:  Moderately participated, sharing some personal reflections / insights and adequately adequately received / provided feedback with other participants.    Patient presentation: good effort, somewhat distracted by specific stressor today but engaged in discussion around memory., Verbalized understanding of content and Patient would benefit from additional opportunities to practice the content to be able to generalize it to their everyday life with increased intentionality, consistency, and efficacy in support of their psychiatric recovery    Treatment Plan:  Patient has a  current master individualized treatment plan.  See Epic treatment plan for more information.      Andrew Osborn, OT

## 2019-07-16 NOTE — PROGRESS NOTES
Adult Mental Health Day Treatment  TRACK: 2B    PATIENT'S NAME: Bernadine Samaniego  MRN:   [id number]  :   1975  ACCT. NUMBER: [id number]  DATE OF SERVICE: 19  START TIME: 900  END TIME: 950  NUMBER OF PARTICIPANTS: 7      Summary of Group / Topics Discussed:  Coping Skills: Grounding: Patients discussed and practiced strategies to increase attachment / presence to the current moment.  Patients identified situations in which using these strategies will help improve emotion regulation sense of calm in the body.  Reviewed the benefits of applying grounding strategies, as well as past / current practices of each member.  Patients identified situations in which using these strategies would reduce stress. They developed the ability to distinguish when these strategies can be useful in their lives for management and stress and psychological well-being.    Patient Session Goals / Objectives:    Understand the purpose of using grounding strategies to reduce stress.    Verbalize understanding of how and when to apply grounding strategies to reduce distress and increase presence in the moment.    Review patients current grounding practices and discuss a more formal way of practicing and accessing skills.    Practice using various calming strategies (e.g. 5-4-3-2-1; mental and body awareness).    Choose 1-2 grounding strategies to apply during times of distress.    Patient Participation / Response:  Fully participated with the group by sharing personal reflections / insights and openly received / provided feedback with other participants.    Demonstrated understanding of topics discussed through group discussion and participation    Treatment Plan:  Patient has a current master individualized treatment plan.  See Epic treatment plan for more information.        Mary Ellen Weaver [facility]

## 2019-07-17 ENCOUNTER — HOSPITAL ENCOUNTER (OUTPATIENT)
Dept: BEHAVIORAL HEALTH | Facility: CLINIC | Age: 44
End: 2019-07-17
Attending: PSYCHIATRY & NEUROLOGY
Payer: MEDICARE

## 2019-07-17 PROCEDURE — 90853 GROUP PSYCHOTHERAPY: CPT

## 2019-07-17 PROCEDURE — G0177 OPPS/PHP; TRAIN & EDUC SERV: HCPCS

## 2019-07-17 NOTE — PROGRESS NOTES
"  Adult Mental Health Outpatient Group Therapy Progress Note     Client Initial Individualized Goals for Treatment: \" Symptom management of depression\".     See Initial Treatment suggestions for the client during the time between Diagnostic Assessment and completion of the Master Individualized Treatment Plan.     Treatment Goals:  1. Client will notify staff when needing assistance to develop or implement a coping plan to manage suicidal or self injurious urges.Client will use coping plan for safety, as needed.  2. When in life skills group Naomi will practice strategies to help improve motivation so as to get done what she needs to have a more satisfying life routine and providing an update of progress weekly.  3. In group Naomi  will learn, practice, generalize 2 sensory modulation or sensorimotor mindfulness based self regulation skills for improved ability to stay in the present moment and distress tolerance.  4. Bernadine will report on symptoms and identify skills to use to manage depression.  5. Naomi will improve wellness related behaviors by getting enough sleep,exercise and balanced nutrition  6. Client will establish an aftercare plan to include medical providers and social supports by discharge.     Area of Treatment Focus:  Symptom Management and Personal Safety    Therapeutic Interventions/Treatment Strategies:  Support, Feedback, Safety Assessments and Clarification    Response to Treatment Strategies:  Accepted Feedback, Gave Feedback, Listened, Focused on Goals, Attentive, Accepted Support and Alert    Name of Group:  Group Psychotherapy [id number]    Group Participants: 7    Description and Outcome:  Bernadine reports stable mood today. She declined to take time but was an active member of the group.    Bernadine denied any personal safety concerns and was supportive of the other group members.    Client demonstrated understanding of session content by actively listening..    Client would benefit from " additional opportunities to practice and implement content from this session.    Is this a Weekly Review of the Progress on the Treatment Plan?  No

## 2019-07-17 NOTE — PROGRESS NOTES
Adult Mental Health Day Treatment  TRACK: 2B    PATIENT'S NAME: Bernadine Samaniego  MRN:   [id number]  :   1975  ACCT. NUMBER: [id number]  DATE OF SERVICE: 19  START TIME: 1000  END TIME: 1050  NUMBER OF PARTICIPANTS: 7      Summary of Group / Topics Discussed:  Specialty Topics: Grief/Transitions: Patients received an overview of the grief process.  Patients explored their relationship to loss and how that has affected their mental health symptoms.  Strategies for recognizing loss and ideas for engaging in the grief process was presented and discussed.  Patients identified needs for support and coping skills to manage loss.  The purpose of this specialty topic is to help patients identify the aspects of change due to loss that individuals experience in addition to mental health symptoms to better cope with the grief, loss, and life transitions.      Patient Session Goals / Objectives:    Identified grief, loss, and life transitions     Discussed how grief impacts mental health symptoms and disrupts usual functioning    Identified needs for support and coping with grief and planned further action for coping    Patient Participation / Response:  Fully participated with the group by sharing personal reflections / insights and openly received / provided feedback with other participants.    Demonstrated understanding of topics discussed through group discussion and participation    Treatment Plan:  Patient has a current master individualized treatment plan.  See Epic treatment plan for more information.        LEE ConnollySW

## 2019-07-17 NOTE — PROGRESS NOTES
Adult Mental Health Day Treatment  TRACK: 4C    PATIENT'S NAME: Bernadine Samaniego  MRN:   [id number]  :   1975  ACCT. NUMBER: [id number]  DATE OF SERVICE: 19  START TIME: 0900  END TIME: 1000  NUMBER OF PARTICIPANTS: 7      Summary of Group / Topics Discussed:  Resiliency Development: Self Awareness and Self Expression: Thinking about Change: Patients explored and identified their strengths, emotions, barriers, skills, and behavior patterns that occur when changes happen in life.  Focus was on recognizing these aspects and developing ways to help support moving forward, making changes as needed to support resiliency.      Patient Session Goals / Objectives:    Identified emotions, barriers, skills, strengths, and behavior patterns that occur when changes happen in life and how to effectively cope     Improved awareness of the process of change and skills and strategies that support this       Established a plan for practice of these skills in their own environments    Practiced and reflected on how to generalize taught skills to their everyday life    Patient Participation / Response:  Fully participated with the group by sharing personal reflections / insights and openly received / provided feedback with other participants.  Patient presentation: Calm,alert and focused with stable mood, Verbalized understanding of content and Patient would benefit from additional opportunities to practice the content to be able to generalize it to their everyday life with increased intentionality, consistency, and efficacy in support of their psychiatric recovery    Treatment Plan:  Patient has a current master individualized treatment plan.  See Epic treatment plan for more information.          [place], JUDDR/L

## 2019-07-24 ENCOUNTER — HOSPITAL ENCOUNTER (OUTPATIENT)
Dept: BEHAVIORAL HEALTH | Facility: CLINIC | Age: 44
End: 2019-07-24
Attending: PSYCHIATRY & NEUROLOGY
Payer: MEDICARE

## 2019-07-24 PROCEDURE — G0177 OPPS/PHP; TRAIN & EDUC SERV: HCPCS

## 2019-07-24 PROCEDURE — 90853 GROUP PSYCHOTHERAPY: CPT

## 2019-07-24 NOTE — PROGRESS NOTES
Adult Mental Health Day Treatment  TRACK: 4B    PATIENT'S NAME: Bernadine Samaniego  MRN:   [id number]  :   1975  ACCT. NUMBER: [id number]  DATE OF SERVICE: 19  START TIME: 1100  END TIME: 1150  NUMBER OF PARTICIPANTS: 8      Summary of Group / Topics Discussed:  Self-Awareness: Self-Compassion: Patients received overview of key concepts in developing self-compassion. Patients discussed mindfulness, self-kindness, and finding common humanity. Patients identified their current approach to problems in their lives and learned skills for increasing self-compassion. Patients identified ways they can put self-compassion skills into practice and problem solve barriers to application of skills.     Patient Session Goals / Objectives:    [place] components of self-compassion    Identify ways to practice self-compassion in daily life    Problem solve barriers to self-compassion practice    Patient Participation / Response:  Fully participated with the group by sharing personal reflections / insights and openly received / provided feedback with other participants.    Demonstrated understanding of topics discussed through group discussion and participation    Treatment Plan:  Patient has a current master individualized treatment plan.  See Epic treatment plan for more information.        RUTHANN Connolly

## 2019-07-24 NOTE — PROGRESS NOTES
Adult Mental Health Day Treatment  TRACK:     PATIENT'S NAME: Bernadine Samaniego  MRN:   [id number]  :   1975  ACCT. NUMBER: [id number]  DATE OF SERVICE: 19  START TIME: 9:00  END TIME: 9:50  NUMBER OF PARTICIPANTS: 8      Summary of Group / Topics Discussed:  Cognitive Functioning:Decision making: Patients were taught and provided with an opportunity to gain awareness of how their mental health symptoms impact their current cognitive functioning as well as how this impacts their performance and participation in meaningful roles, relationships, and routines.  Patients were taught skills and strategies on how to monitor and improve cognitive performance through remediation or compensatory strategies focused on making decisions.  Patients were given opportunities to practice taught skills and techniques in session and how to apply to everyday life.        Patient Session Goals / Objectives:    Identified how their mental health symptoms impact their functioning, focusing on specific cognitive challenges related to effective decision making.      Improved awareness of specific remediation and/or compensatory strategies to improve  executive functioning skills and how this relates to mental health recovery        Established a plan for practice of these skills in their own environments    Practiced and reflected on how to generalize taught skills to their everyday life    Patient Participation / Response:  Fully participated with the group by sharing personal reflections / insights and openly received / provided feedback with other participants.    Patient presentation: good effort, sharing, and exchanging support and validation with peers. Able to identify her preferred decision making style., Verbalized understanding of content and Patient would benefit from additional opportunities to practice the content to be able to generalize it to their everyday life with increased intentionality, consistency, and  efficacy in support of their psychiatric recovery    Treatment Plan:  Patient has a current master individualized treatment plan.  See Epic treatment plan for more information.          Andrew Osborn, OT

## 2019-07-24 NOTE — PROGRESS NOTES
"  Adult Mental Health Outpatient Group Therapy Progress Note       Client Initial Individualized Goals for Treatment: \" Symptom management of depression\".     See Initial Treatment suggestions for the client during the time between Diagnostic Assessment and completion of the Master Individualized Treatment Plan.     Treatment Goals:  1. Client will notify staff when needing assistance to develop or implement a coping plan to manage suicidal or self injurious urges.Client will use coping plan for safety, as needed.  2. When in life skills group Naomi will practice strategies to help improve motivation so as to get done what she needs to have a more satisfying life routine and providing an update of progress weekly.  3. In group Naomi  will learn, practice, generalize 2 sensory modulation or sensorimotor mindfulness based self regulation skills for improved ability to stay in the present moment and distress tolerance.  4. Bernadine will report on symptoms and identify skills to use to manage depression.  5. Naomi will improve wellness related behaviors by getting enough sleep,exercise and balanced nutrition  6. Client will establish an aftercare plan to include medical providers and social supports by discharge.       Area of Treatment Focus:  Symptom Management and Personal Safety    Therapeutic Interventions/Treatment Strategies:  Support, Feedback, Safety Assessments and Clarification    Response to Treatment Strategies:  Accepted Feedback, Gave Feedback, Listened, Focused on Goals, Attentive, Accepted Support and Alert    Name of Group:  Group Psychotherapy [id number]    Group Participants: 8    Description and Outcome:  Bernadine continues to voice frustration over past events, particularly as it relates to career path.  She voices frustration that she seems to have faced obstacles throughout her path even though she \"did everything\" she was supposed to do such as get good grades.  After initial resistance, she was " open to receiving information from peer regarding disability program with state.  Bernadine did not go for walk as was goal yesterday, but she did get to book event with friend.  She states this event was at [employer] and she admits to one beer.  Bernadine denied any personal safety concerns and was supportive of the other group members.    Client demonstrated understanding of session content by accepting feedback regarding resources..    Client would benefit from additional opportunities to practice and implement content from this session.    Is this a Weekly Review of the Progress on the Treatment Plan?  No

## 2019-07-30 ENCOUNTER — HOSPITAL ENCOUNTER (OUTPATIENT)
Dept: BEHAVIORAL HEALTH | Facility: CLINIC | Age: 44
End: 2019-07-30
Attending: PSYCHIATRY & NEUROLOGY
Payer: MEDICARE

## 2019-07-30 PROCEDURE — 90853 GROUP PSYCHOTHERAPY: CPT

## 2019-07-30 PROCEDURE — G0177 OPPS/PHP; TRAIN & EDUC SERV: HCPCS

## 2019-07-30 NOTE — PROGRESS NOTES
"  Adult Mental Health Outpatient Group Therapy Progress Note       Client Initial Individualized Goals for Treatment: \" Symptom management of depression\".     See Initial Treatment suggestions for the client during the time between Diagnostic Assessment and completion of the Master Individualized Treatment Plan.     Treatment Goals:  1. Client will notify staff when needing assistance to develop or implement a coping plan to manage suicidal or self injurious urges.Client will use coping plan for safety, as needed.  2. When in life skills group Naomi will practice strategies to help improve motivation so as to get done what she needs to have a more satisfying life routine and providing an update of progress weekly.  4. Bernadine will report on symptoms and identify skills to use to manage depression.  5. Naomi will improve wellness related behaviors by getting enough sleep,exercise and balanced nutrition  6. Client will establish an aftercare plan to include medical providers and social supports by discharge.   7. Bernadine will address reduction of alcohol use, addressing barriers       Area of Treatment Focus:  Symptom Management and Personal Safety    Therapeutic Interventions/Treatment Strategies:  Support, Feedback, Safety Assessments and Clarification    Response to Treatment Strategies:  Accepted Feedback, Gave Feedback, Listened, Focused on Goals, Attentive, Accepted Support and Alert    Name of Group:  Group Psychotherapy [id number]    Group Participants: 7    Description and Outcome:  Bernadine states reason for absence of Friday was work.  She states that she has followed through with goals of exercise, doing yoga.  She states however that one class with a yoga and cocktail class.  Bernadine continues to state she is not interested in abstinence but is interested in harm reduction.  Bernadine identifies tendency toward avoidance.  She does not know reason but understands that result is self sabotage.   Reviewed treatment " plan with Bernadine, adding goal around alcohol use.  Will extend discharge date due to change in track and treatment team.  Bernadine denied any personal safety concerns and was supportive of the other group members.    Client demonstrated understanding of session content by accepting feedback and identifying barriers..    Client would benefit from additional opportunities to practice and implement content from this session.    Is this a Weekly Review of the Progress on the Treatment Plan?  Yes.      Are Treatment Plan Goals being addressed?  Yes, continue treatment goals      Are Treatment Plan Strategies to Address Goals Effective?  Yes, continue treatment strategies      Are there any current contracts in place?  No

## 2019-07-30 NOTE — PROGRESS NOTES
"Adult Mental Health Outpatient Group Therapy Progress Note     Client Initial Individualized Goals for Treatment: \" Symptom management of depression\".     See Initial Treatment suggestions for the client during the time between Diagnostic Assessment and completion of the Master Individualized Treatment Plan.     Treatment Goals:  1. Client will notify staff when needing assistance to develop or implement a coping plan to manage suicidal or self injurious urges.Client will use coping plan for safety, as needed.  2. When in life skills group Naomi will practice strategies to help improve motivation so as to get done what she needs to have a more satisfying life routine and providing an update of progress weekly.  3. In group Naomi  will learn, practice, generalize 2 sensory modulation or sensorimotor mindfulness based self regulation skills for improved ability to stay in the present moment and distress tolerance.  4. Bernadine will report on symptoms and identify skills to use to manage depression.  5. Naomi will improve wellness related behaviors by getting enough sleep,exercise and balanced nutrition  6. Client will establish an aftercare plan to include medical providers and social supports by discharge.     Area of Treatment Focus:  Symptom Management    Therapeutic Interventions/Treatment Strategies:  Support, Feedback, Structured Activity, Clarification and Education    Response to Treatment Strategies:  Accepted Feedback, Gave Feedback, Listened, Focused on Goals, Attentive, Accepted Support and Alert     Name of Group: mental health management Date/Time: 7/30 / 19 900 to 950    Number of Group Participants: 7     Description and Outcome:  The group was guided through a structured body scanning exercise to facilitate mindfulness and emotion identification.  The group was encouraged to notice/observe how varying emotions were experienced in the body, noting what needed attention today and from there identifying " an intention for the week.  After identifying an intention patients were asked to find a gesture that would express or embody that intention and sharing that gesture with the group both as a way to be witnessed and to be  Joined.  Patients demonstrated understanding of session by participating in experiential and sharing intention with the group. Bernadine set an intention around willingness.  Client would benefit from additional opportunities to practice and implement content from this session.    Is this a Weekly Review of the Progress on the Treatment Plan?  No

## 2019-07-30 NOTE — PROGRESS NOTES
Adult Mental Health Day Treatment  TRACK: 4B    PATIENT'S NAME: Bernadine Samaniego  MRN:   [id number]  :   1975  ACCT. NUMBER: [id number]  DATE OF SERVICE: 19  START TIME: 1100  END TIME: 1150  NUMBER OF PARTICIPANTS: 7      Summary of Group / Topics Discussed:  Relationship Skills: Assertive Communication: Patients were provided with a general overview of assertive communication skills and how practicing assertive communication skills will assist patients in developing healthier and more effective relationships. Patients reviewed their current awareness on ability to practice assertive communication, ways to increase assertive communication, and identified/problem solved barriers to assertive communication.     Patient Session Goals / Objectives:    Identified and discussed patient individual challenges with communication    Presented and practiced effective communication skills in session    Assisted patients in implementing more effective communication skills in their relationships    Patient Participation / Response:  Fully participated with the group by sharing personal reflections / insights and openly received / provided feedback with other participants.    Demonstrated understanding of topics discussed through group discussion and participation    Treatment Plan:  Patient has a current master individualized treatment plan.  See Epic treatment plan for more information.        RUTHANN Connolly

## 2019-07-31 ENCOUNTER — HOSPITAL ENCOUNTER (OUTPATIENT)
Dept: BEHAVIORAL HEALTH | Facility: CLINIC | Age: 44
End: 2019-07-31
Attending: PSYCHIATRY & NEUROLOGY
Payer: MEDICARE

## 2019-07-31 PROCEDURE — 90853 GROUP PSYCHOTHERAPY: CPT

## 2019-07-31 PROCEDURE — G0177 OPPS/PHP; TRAIN & EDUC SERV: HCPCS

## 2019-07-31 NOTE — PROGRESS NOTES
Adult Mental Health Day Treatment  TRACK: 4B    PATIENT'S NAME: Bernadine Samaniego  MRN:   [id number]  :   1975  ACCT. NUMBER: [id number]  DATE OF SERVICE: 19  START TIME: 1100  END TIME: 1150  NUMBER OF PARTICIPANTS: 7      Summary of Group / Topics Discussed:  Relationship Skills: Assertive Communication: Patients were provided with a general overview of assertive communication skills and how practicing assertive communication skills will assist patients in developing healthier and more effective relationships. Patients reviewed their current awareness on ability to practice assertive communication, ways to increase assertive communication, and identified/problem solved barriers to assertive communication.     Patient Session Goals / Objectives:    Identified and discussed patient individual challenges with communication    Presented and practiced effective communication skills in session    Assisted patients in implementing more effective communication skills in their relationships    Patient Participation / Response:  Fully participated with the group by sharing personal reflections / insights and openly received / provided feedback with other participants.    Demonstrated understanding of topics discussed through group discussion and participation    Treatment Plan:  Patient has a current master individualized treatment plan.  See Epic treatment plan for more information.        RUTHANN Connolly

## 2019-07-31 NOTE — PROGRESS NOTES
"  Adult Mental Health Outpatient Group Therapy Progress Note       Client Initial Individualized Goals for Treatment: \" Symptom management of depression\".     See Initial Treatment suggestions for the client during the time between Diagnostic Assessment and completion of the Master Individualized Treatment Plan.     Treatment Goals:  1. Client will notify staff when needing assistance to develop or implement a coping plan to manage suicidal or self injurious urges.Client will use coping plan for safety, as needed.  2. When in life skills group Naomi will practice strategies to help improve motivation so as to get done what she needs to have a more satisfying life routine and providing an update of progress weekly.  4. Bernadine will report on symptoms and identify skills to use to manage depression.  5. Naomi will improve wellness related behaviors by getting enough sleep,exercise and balanced nutrition  6. Client will establish an aftercare plan to include medical providers and social supports by discharge.   7. Bernadine will address reduction of alcohol use, addressing barriers         Area of Treatment Focus:  Symptom Management, Personal Safety and Community Resources/Discharge Planning    Therapeutic Interventions/Treatment Strategies:  Support, Feedback, Safety Assessments and Clarification    Response to Treatment Strategies:  Accepted Feedback, Gave Feedback, Listened, Focused on Goals, Attentive, Accepted Support and Alert    Name of Group:  Group Psychotherapy [id number]    Group Participants: 7    Description and Outcome:  Bernadine reports stable mood.  When asked about favorite mindful activity she states yoga.  She has plans to take outdoor yoga class tonight.  She is exploring class through book center that will take place next week.    Bernadine denied any personal safety concerns and was supportive of the other group members.    Client demonstrated understanding of session content by actively listening and " offering support to peers..    Client would benefit from additional opportunities to practice and implement content from this session.    Is this a Weekly Review of the Progress on the Treatment Plan?  No

## 2019-08-02 ENCOUNTER — HOSPITAL ENCOUNTER (OUTPATIENT)
Dept: BEHAVIORAL HEALTH | Facility: CLINIC | Age: 44
End: 2019-08-02
Attending: PSYCHIATRY & NEUROLOGY
Payer: MEDICARE

## 2019-08-02 PROCEDURE — G0177 OPPS/PHP; TRAIN & EDUC SERV: HCPCS

## 2019-08-02 PROCEDURE — 90853 GROUP PSYCHOTHERAPY: CPT

## 2019-08-02 NOTE — PROGRESS NOTES
"  Adult Mental Health Outpatient Group Therapy Progress Note     Client Initial Individualized Goals for Treatment: \" Symptom management of depression\".     See Initial Treatment suggestions for the client during the time between Diagnostic Assessment and completion of the Master Individualized Treatment Plan.     Treatment Goals:  1. Client will notify staff when needing assistance to develop or implement a coping plan to manage suicidal or self injurious urges.Client will use coping plan for safety, as needed.  2. When in life skills group Naomi will practice strategies to help improve motivation so as to get done what she needs to have a more satisfying life routine and providing an update of progress weekly.  4. Bernadine will report on symptoms and identify skills to use to manage depression.  5. Naomi will improve wellness related behaviors by getting enough sleep,exercise and balanced nutrition  6. Client will establish an aftercare plan to include medical providers and social supports by discharge.   7. Bernadine will address reduction of alcohol use, addressing barriers       Area of Treatment Focus:  Symptom Management and Personal Safety    Therapeutic Interventions/Treatment Strategies:  Support, Feedback, Safety Assessments and Clarification    Response to Treatment Strategies:  Accepted Feedback, Gave Feedback, Listened, Focused on Goals, Attentive, Accepted Support and Alert    Name of Group:  Group Psychotherapy 1 10: AM, Group Psychotherapy 2 11:00-11:50    Group Participants: 6,7     Description and Outcome, Group Psychotherapy 1  Bernadine shared with the group her conflict with mom and stepdad with whom she is living.  She states they are not understanding around mental illness and she feels as if she is negatively compared to brother.  She voices frustration that they have not taken responsibility in her experiences  As a child.  Bernadine states that she has been working toward finding alternative " housing which includes section 8. She is also considering a move to [place] where mother's first  lives and with whom Bernadine for a time had thought was her biological father.    Client denied any personal safety concerns and was supportive of the other group members.    Client demonstrated understanding of session content by expressing willingness to work toward change..    Client would benefit from additional opportunities to practice and implement content from this session.    Description and Outcome, Group Psychotherapy 2  Summary of Group / Topics Discussed:  Specialty Topics: Life Transitions/Employment The topic of life transitions, specifically related to work experience, was presented per group request.  The patients discussed barriers to seeking or making changes in work situation including shame, stigma, fears, and symptoms of anxiety/depression.  The group discussed importance of setting aside a place, scheduling time and setting small goals. The group also identified accountability as helpful.      Patient Session Goals / Objectives:    Discussed the timing and nature of major life transitions, including work experience    Explored how life transitions may impact mental health and functioning    Discussed coping strategies to manage symptoms and help with transitioning    Discussed and planned a successful transition      Client would benefit from additional opportunities to practice and implement content from this session.    Is this a Weekly Review of the Progress on the Treatment Plan?  Yes.      Are Treatment Plan Goals being addressed?  Yes, continue treatment goals      Are Treatment Plan Strategies to Address Goals Effective?  Yes, continue treatment strategies      Are there any current contracts in place?  No

## 2019-08-06 ENCOUNTER — HOSPITAL ENCOUNTER (OUTPATIENT)
Dept: BEHAVIORAL HEALTH | Facility: CLINIC | Age: 44
End: 2019-08-06
Attending: PSYCHIATRY & NEUROLOGY
Payer: MEDICARE

## 2019-08-06 PROCEDURE — G0177 OPPS/PHP; TRAIN & EDUC SERV: HCPCS

## 2019-08-06 PROCEDURE — 90853 GROUP PSYCHOTHERAPY: CPT

## 2019-08-06 NOTE — PROGRESS NOTES
"Adult Mental Health Outpatient Group Therapy Progress Note     Client Initial Individualized Goals for Treatment: \" Symptom management of depression\".     See Initial Treatment suggestions for the client during the time between Diagnostic Assessment and completion of the Master Individualized Treatment Plan.     Treatment Goals:  1. Client will notify staff when needing assistance to develop or implement a coping plan to manage suicidal or self injurious urges.Client will use coping plan for safety, as needed.  2. When in life skills group Naomi will practice strategies to help improve motivation so as to get done what she needs to have a more satisfying life routine and providing an update of progress weekly.  4. Bernadine will report on symptoms and identify skills to use to manage depression.  5. Naomi will improve wellness related behaviors by getting enough sleep,exercise and balanced nutrition  6. Client will establish an aftercare plan to include medical providers and social supports by discharge.   7. Bernadine will address reduction of alcohol use, addressing barriers         Area of Treatment Focus:  Symptom Management    Therapeutic Interventions/Treatment Strategies:  Support, Feedback, Structured Activity, Clarification and Education    Response to Treatment Strategies:  Accepted Feedback, Gave Feedback, Listened, Focused on Goals, Attentive, Accepted Support and Alert     Name of Group: mental health management Date/Time: 8/6 / 19 900 to 950    Number of Group Participants: 7     Description and Outcome:  Clients were provided education  On interoceptive regulation. Discussion took place regarding the experience of \"dissociation\" from body vs. Experience of being present in the body.  The group explored range of movement, and expansion of movement repetoire as well as self expression through movement.  Patients demonstrated understanding of session by engaging in experiential and contributing to " discussion.  Bernadine shared that mood improves when moving, although depression presents as barrier.      Client would benefit from additional opportunities to practice and implement content from this session.    Is this a Weekly Review of the Progress on the Treatment Plan?  No

## 2019-08-06 NOTE — PROGRESS NOTES
"  Adult Mental Health Outpatient Group Therapy Progress Note     Client Initial Individualized Goals for Treatment: \" Symptom management of depression\".     See Initial Treatment suggestions for the client during the time between Diagnostic Assessment and completion of the Master Individualized Treatment Plan.     Treatment Goals:  1. Client will notify staff when needing assistance to develop or implement a coping plan to manage suicidal or self injurious urges.Client will use coping plan for safety, as needed.  2. When in life skills group Naomi will practice strategies to help improve motivation so as to get done what she needs to have a more satisfying life routine and providing an update of progress weekly.  4. Bernadine will report on symptoms and identify skills to use to manage depression.  5. Naomi will improve wellness related behaviors by getting enough sleep,exercise and balanced nutrition  6. Client will establish an aftercare plan to include medical providers and social supports by discharge.   7. Bernadine will address reduction of alcohol use, addressing barriers       Area of Treatment Focus:  Symptom Management, Personal Safety and Develop Socialization / Interpersonal Relationship Skills    Therapeutic Interventions/Treatment Strategies:  Support, Feedback, Safety Assessments, Clarification and Education    Response to Treatment Strategies:  Accepted Feedback, Gave Feedback, Listened, Focused on Goals, Attentive, Accepted Support and Alert    Name of Group:  Group Psychotherapy 1 10: AM, Group Psychotherapy 2 11:00-11:50    Group Participants: 7     Description and Outcome, Group Psychotherapy 1  Bernadine shared more of her story and her ambivalence regarding Christopher.  She asked for feedback from peers.  Bernadine states that Christopher was the closest she had to father and that they had lost contact for many years.  She identifies ambivalence towards reconnecting with him and is asking for group's feedback. "  Bernadine accepted support and validation from group members.  Discussed nature of early memories.  Bernadine continues to be resistant to letting go of past. Continues to state her frustration with parents' inability to take responsibility.  Client denied any personal safety concerns and was supportive of the other group members.    Client demonstrated understanding of session content by asking for group's feedback..    Client would benefit from additional opportunities to practice and implement content from this session.    Description and Outcome, Group Psychotherapy 2  The group was shown [facility] Talk on Shame.  Group members were encouraged to share this experience of shame and begin to identify ways in which their experience of shame (not being good enough, being a mistake, etc) impacts relationships and experience in life.  Client demonstrated understanding of session through contributions to discussion.Sarahi identifies with experience of shame and states it impacts her experience in life.      Client would benefit from additional opportunities to practice and implement content from this session.    Is this a Weekly Review of the Progress on the Treatment Plan?  No

## 2019-08-07 ENCOUNTER — HOSPITAL ENCOUNTER (OUTPATIENT)
Dept: BEHAVIORAL HEALTH | Facility: CLINIC | Age: 44
End: 2019-08-07
Attending: PSYCHIATRY & NEUROLOGY
Payer: MEDICARE

## 2019-08-07 PROCEDURE — 90853 GROUP PSYCHOTHERAPY: CPT

## 2019-08-07 PROCEDURE — G0177 OPPS/PHP; TRAIN & EDUC SERV: HCPCS

## 2019-08-07 NOTE — PROGRESS NOTES
Adult Mental Health Day Treatment  TRACK: 4B    PATIENT'S NAME: Bernadine Samaniego  MRN:   [id number]  :   1975  ACCT. NUMBER: [id number]  DATE OF SERVICE: 19  START TIME: 09  END TIME: 0950  NUMBER OF PARTICIPANTS: 7      Summary of Group / Topics Discussed:  Lifestyle Balance and Structure: Goal-setting & integration: Patients were introduced to the process and benefits of setting realistic, timely, and achievable goals to help support their ability to follow through with meaningful personal, health, recovery and treatment goals that they would like to achieve to improve overall functioning.  Patients were also taught and then practiced techniques to manage a variety of obstacles to achieve their goals and how to break goals into manageable pieces.  Patients also reported on follow through of goals.     Patient Session Goals / Objectives:    Facilitated the creation of a vision for recovery and wellbeing    Identified and wrote meaningful SMART goals to engage in meaningful activities of daily living     Identified and problem solved barriers to achieving goals     Identified plan to support follow through on goals and reflection on progress made    Patient Participation / Response:  Fully participated with the group by sharing personal reflections / insights and openly received / provided feedback with other participants.    Demonstrated understanding of content through taking the group time to work towards goals reated to employment.he has applied for [employer]icket [employer] Work benefits , Verbalized understanding of content and Patient would benefit from additional opportunities to practice the content to be able to generalize it to their everyday life with increased intentionality, consistency, and efficacy in support of their psychiatric recovery      Treatment Plan:  Patient has a current master individualized treatment plan.  See Epic treatment plan for more information.          LEE GoncalvesSW

## 2019-08-07 NOTE — PROGRESS NOTES
Psychiatry staffing: case discussed  Diagnosis: Bipolar disorder. ETOH dependency. Still drinking. Still lives with parents. Doing relatively well.     Current Outpatient Medications   Medication     BuPROPion HCl (WELLBUTRIN PO)     etonogestrel-ethinyl estradiol (NUVARING) 0.12-0.015 MG/24HR vaginal ring     lamoTRIgine (LAMICTAL) 150 MG tablet     levothyroxine (SYNTHROID/LEVOTHROID) 50 MCG tablet     norethindrone-ethinyl estradiol (MICROGESTIN 1.5/30) 1.5-30 MG-MCG per tablet     TRAZODONE HCL PO     Vilazodone HCl (VIIBRYD PO)     No current facility-administered medications for this encounter.      Past Medical History:   Diagnosis Date     Depression     In counseling; has Psychiatrist     Depressive disorder

## 2019-08-07 NOTE — PROGRESS NOTES
"  Adult Mental Health Outpatient Group Therapy Progress Note       Client Initial Individualized Goals for Treatment: \" Symptom management of depression\".     See Initial Treatment suggestions for the client during the time between Diagnostic Assessment and completion of the Master Individualized Treatment Plan.     Treatment Goals:  1. Client will notify staff when needing assistance to develop or implement a coping plan to manage suicidal or self injurious urges.Client will use coping plan for safety, as needed.  2. When in life skills group Naomi will practice strategies to help improve motivation so as to get done what she needs to have a more satisfying life routine and providing an update of progress weekly.  4. Bernadine will report on symptoms and identify skills to use to manage depression.  5. Naomi will improve wellness related behaviors by getting enough sleep,exercise and balanced nutrition  6. Client will establish an aftercare plan to include medical providers and social supports by discharge.   7. Bernadine will address reduction of alcohol use, addressing barriers         Area of Treatment Focus:  Symptom Management and Personal Safety    Therapeutic Interventions/Treatment Strategies:  Support, Feedback, Safety Assessments and Clarification    Response to Treatment Strategies:  Accepted Feedback, Gave Feedback, Listened, Focused on Goals, Attentive, Accepted Support and Alert    Name of Group:  Group Psychotherapy [id number]    Group Participants: 7    Description and Outcome:  Bernadine shared with group her experience of shame around work environment.  She is working at [employer] and is anticipating private event of .  She is concerned about seeing others that she knows.  Bernadine states that she believes value is based on accomplishments.  She states that values different people differently.  She continues to focus on experience of past wrongs and has difficulty keeping focus on present.  Bernadine " denied any personal safety concerns and was supportive of the other group members.    Client demonstrated understanding of session content by sharing experience with group members..    Client would benefit from additional opportunities to practice and implement content from this session.    Is this a Weekly Review of the Progress on the Treatment Plan?  No

## 2019-08-07 NOTE — PROGRESS NOTES
Adult Mental Health Day Treatment  TRACK: 4B    PATIENT'S NAME: Bernadine Samaniego  MRN:   [id number]  :   1975  ACCT. NUMBER: [id number]  DATE OF SERVICE: 19  START TIME: 1100  END TIME: 1150  NUMBER OF PARTICIPANTS: 7      Summary of Group / Topics Discussed:  Emotions Management: Guilt and Shame: Patients explored and shared personal experiences associated with feelings of guilt and shame.  Group explored how these feelings develop, what they mean to each individual, and how to increase acceptance and usefulness of these feelings.  Group members assisted one another to contextualize these concepts and promote healing.     Patient Session Goals / Objectives:    Discuss and review definitions and personal views/experiences with guilt and shame    Understand the differences between guilt and shame    Explore how feelings of guilt and shame impact functioning    Understand and practice strategies to manage difficult emotions and move towards healing    Understand and normalize difficult emotions through group discussion    Understand the utility of guilt and shame    Target  unwanted  emotions for change    Patient Participation / Response:  Fully participated with the group by sharing personal reflections / insights and openly received / provided feedback with other participants.    Demonstrated understanding of topics discussed through group discussion and participation    Treatment Plan:  Patient has a current master individualized treatment plan.  See Epic treatment plan for more information.        RUTHANN Connolly

## 2019-08-09 ENCOUNTER — HOSPITAL ENCOUNTER (OUTPATIENT)
Dept: BEHAVIORAL HEALTH | Facility: CLINIC | Age: 44
End: 2019-08-09
Attending: PSYCHIATRY & NEUROLOGY
Payer: MEDICARE

## 2019-08-09 PROCEDURE — 90853 GROUP PSYCHOTHERAPY: CPT

## 2019-08-09 PROCEDURE — G0177 OPPS/PHP; TRAIN & EDUC SERV: HCPCS

## 2019-08-09 NOTE — PROGRESS NOTES
Adult Mental Health Day Treatment  TRACK: 4B    PATIENT'S NAME: Bernadine Samaniego  MRN:   [id number]  :   1975  ACCT. NUMBER: [id number]  DATE OF SERVICE: 19  START TIME: 1100  END TIME: 1150  NUMBER OF PARTICIPANTS: 7      Summary of Group / Topics Discussed:  Self-Awareness: Values: Patients identified personal values by examining development of their current values and how their values influence their daily functioning and life choices. Patients explored the impact of their values on their thoughts, feelings, and actions. Patients discussed definition of personal values and how they develop and change over time. The goal is to help patients reconcile value conflicts and achieve balance and flexibility to improve mood and daily functioning.     Patient Session Goals / Objectives:    Examined development of values and impact of values on functioning    Identified and prioritized important values related to current well-being     Identified strategies to change or enhance values to positively impact symptoms    Assisted patients to find ways to adapt functioning to better fit their values    Patient Participation / Response:  Fully participated with the group by sharing personal reflections / insights and openly received / provided feedback with other participants.    Demonstrated understanding of topics discussed through group discussion and participation    Treatment Plan:  Patient has a current master individualized treatment plan.  See Epic treatment plan for more information.        RUTHANN Connolly

## 2019-08-09 NOTE — PROGRESS NOTES
"  Adult Mental Health Outpatient Group Therapy Progress Note       Client Initial Individualized Goals for Treatment: \" Symptom management of depression\".     See Initial Treatment suggestions for the client during the time between Diagnostic Assessment and completion of the Master Individualized Treatment Plan.     Treatment Goals:  1. Client will notify staff when needing assistance to develop or implement a coping plan to manage suicidal or self injurious urges.Client will use coping plan for safety, as needed.  2. When in life skills group Naomi will practice strategies to help improve motivation so as to get done what she needs to have a more satisfying life routine and providing an update of progress weekly.  4. Bernadine will report on symptoms and identify skills to use to manage depression.  5. Naomi will improve wellness related behaviors by getting enough sleep,exercise and balanced nutrition  6. Client will establish an aftercare plan to include medical providers and social supports by discharge.   7. Bernadine will address reduction of alcohol use, addressing barriers         Area of Treatment Focus:  Symptom Management and Personal Safety    Therapeutic Interventions/Treatment Strategies:  Support, Feedback, Safety Assessments and Clarification    Response to Treatment Strategies:  Accepted Feedback, Gave Feedback, Listened, Focused on Goals, Attentive, Accepted Support and Alert    Name of Group:  Group Psychotherapy 9:00 AM - 9:50 AM    Group Participants: 7    Description and Outcome:  Berndaine reports improved mood.  She met with ticket to work company and completed 3 applications.  She reports sense of accomplishment.  Bernadine was an active member of the group, asking clarifying questions of peers.  Bernadine denied any personal safety concerns and was supportive of the other group members.    Client demonstrated understanding of session content by reporting follow through of goals..    Client would benefit from " additional opportunities to practice and implement content from this session.    Is this a Weekly Review of the Progress on the Treatment Plan?  Yes.      Are Treatment Plan Goals being addressed?  Yes, continue treatment goals      Are Treatment Plan Strategies to Address Goals Effective?  Yes, continue treatment strategies      Are there any current contracts in place?  No

## 2019-08-13 ENCOUNTER — HOSPITAL ENCOUNTER (OUTPATIENT)
Dept: BEHAVIORAL HEALTH | Facility: CLINIC | Age: 44
End: 2019-08-13
Attending: PSYCHIATRY & NEUROLOGY
Payer: MEDICARE

## 2019-08-13 PROCEDURE — 90853 GROUP PSYCHOTHERAPY: CPT

## 2019-08-13 PROCEDURE — G0177 OPPS/PHP; TRAIN & EDUC SERV: HCPCS

## 2019-08-13 NOTE — PROGRESS NOTES
Adult Mental Health Day Treatment  TRACK: 4B    PATIENT'S NAME: Bernadine Samaniego  MRN:   [id number]  :   1975  ACCT. NUMBER: [id number]  DATE OF SERVICE: 19  First Group: 09:  Second Group: [id number]  NUMBER OF PARTICIPANTS: 8      First Group: Process Group: Bernadine denied any safety issues today or over the weekend, She arrived to first group 10 min. Late, and struggled to catch up on the topic. She is living with her parents and they are going out of town for 10 days, some of the group suggested she party and I asked if that was what she needed and it wasn't. She plans to be less annoyed (parents are gone), to go to yoga, and other self care.  She reported that her parents had a lot of rules and she felt she was treated like a child, and they told her of things they were going to do before they go and this also annoyed her, yet could also be seen as kind. She was not approved for section 8 in [facility], but [facility] can be considered. She is planning to go on a tour to see what, if anything, she is open to. She struggles between being annoyed living with parents and not having money to live on her own until she again has a job. Ashley demonstrated support to other group members in helpful ways.    Second Group: Summary of Group / Topics Discussed:  Emotions Management: Emotions and the Brain: Patients discussed the purpose of the emotions including the biological basis of emotion, with an emphasis on how biology underlies our experience of emotion.  Also reviewed the impact past experiences, sensory input, heredity, culture and other factors have on the development of our brain / emotional development.   Reviewed the biological basis of emotion, with an emphasis on how biology underlies our experience of emotion.    Patient Session Goals / Objectives:    Explore what patients know about the brain and how it relates to emotions.    Apply understanding of content to their own experiences of  emotions.    Demonstrate awareness of how their body reacts to stress through group discussion/participation.    Discuss patient experiences related to mind/body connection; physical and emotional responses to stress.    Patient Participation / Response:  Moderately participated, sharing some personal reflections / insights and adequately adequately received / provided feedback with other participants.    Self-aware of experiences with difficult emotions, and strategies to employ to manage them, Demonstrated knowledge of when to consider applying a variety of emotions management skills in daily life, Demonstrated understanding and practice strategies to manage difficult emotions and move towards healing and Identified barriers to applying emotions management strategies    Treatment Plan:  Patient has a current master individualized treatment plan.  See Epic treatment plan for more information.    Asiya Quach, LEESW

## 2019-08-16 ENCOUNTER — HOSPITAL ENCOUNTER (OUTPATIENT)
Dept: BEHAVIORAL HEALTH | Facility: CLINIC | Age: 44
End: 2019-08-16
Attending: PSYCHIATRY & NEUROLOGY
Payer: MEDICARE

## 2019-08-16 PROCEDURE — G0177 OPPS/PHP; TRAIN & EDUC SERV: HCPCS

## 2019-08-16 PROCEDURE — 90853 GROUP PSYCHOTHERAPY: CPT

## 2019-08-16 NOTE — PROGRESS NOTES
Adult Mental Health Day Treatment  TRACK: 4B    PATIENT'S NAME: Bernadine Samaniego  MRN:   [id number]  :   1975  ACCT. NUMBER: [id number]  DATE OF SERVICE: 19  START TIME: 1100  END TIME: 1150  NUMBER OF PARTICIPANTS: 8      Summary of Group / Topics Discussed:  Emotions Management: Opposite to Emotion: Patients discussed past and present struggles with knowing how to make changes in their lives due to difficult emotional experiences.  Explored desires to experience and feel less anger, sadness, guilt, and fear.  Reviewed the therapeutic skill of opposite action and patients explored opportunities to use their behaviors as a tool to reduce an emotion that they want to change.     Patient Session Goals / Objectives:    Review DBT concepts and focus on patient s experiences of distress and difficult emotional experiences.    Learn how to do the opposite of what an emotion makes us want to do in an effort to decrease an unwanted emotional experience.    Demonstrate understanding of the skill of opposite action by sharing experiences where the technique could be useful in past / present situations.      Patient Participation / Response:  Fully participated with the group by sharing personal reflections / insights and openly received / provided feedback with other participants.    Demonstrated understanding of topics discussed through group discussion and participation    Treatment Plan:  Patient has a current master individualized treatment plan.  See Epic treatment plan for more information.        RUTHANN Connolly

## 2019-08-16 NOTE — PROGRESS NOTES
"  Adult Mental Health Outpatient Group Therapy Progress Note       Client Initial Individualized Goals for Treatment: \" Symptom management of depression\".     See Initial Treatment suggestions for the client during the time between Diagnostic Assessment and completion of the Master Individualized Treatment Plan.     Treatment Goals:  1. Client will notify staff when needing assistance to develop or implement a coping plan to manage suicidal or self injurious urges.Client will use coping plan for safety, as needed.  2. When in life skills group Naomi will practice strategies to help improve motivation so as to get done what she needs to have a more satisfying life routine and providing an update of progress weekly.  4. Bernadine will report on symptoms and identify skills to use to manage depression.  5. Naomi will improve wellness related behaviors by getting enough sleep,exercise and balanced nutrition  6. Client will establish an aftercare plan to include medical providers and social supports by discharge.   7. Berandine will address reduction of alcohol use, addressing barriers         Area of Treatment Focus:  Symptom Management and Personal Safety    Therapeutic Interventions/Treatment Strategies:  Support, Feedback, Safety Assessments and Clarification    Response to Treatment Strategies:  Accepted Feedback, Gave Feedback, Listened, Focused on Goals, Attentive, Accepted Support and Alert    Name of Group:  Group Psychotherapy 9:00 AM - 9:50 AM    Group Participants: 8    Description and Outcome:  Bernadine shared concerns over relationships.  Experiencing loss of new friend who has been decreasing amount of time he is spending with her.  Is  Wondering why her experience is that other's leave her.  Is able to challenge thoughts regarding this particular relationship.  Bernadine needed redirecting with her feedback to others regarding smoking behavior.    Bernadine denied any personal safety concerns and was supportive of the " other group members.    Client demonstrated understanding of session content by accepting feedback..    Client would benefit from additional opportunities to practice and implement content from this session.    Is this a Weekly Review of the Progress on the Treatment Plan?  Yes.      Are Treatment Plan Goals being addressed?  Yes, continue treatment goals      Are Treatment Plan Strategies to Address Goals Effective?  Yes, continue treatment strategies      Are there any current contracts in place?  No

## 2019-08-21 ENCOUNTER — HOSPITAL ENCOUNTER (OUTPATIENT)
Dept: BEHAVIORAL HEALTH | Facility: CLINIC | Age: 44
End: 2019-08-21
Attending: PSYCHIATRY & NEUROLOGY
Payer: MEDICARE

## 2019-08-21 PROCEDURE — 90853 GROUP PSYCHOTHERAPY: CPT

## 2019-08-21 PROCEDURE — G0177 OPPS/PHP; TRAIN & EDUC SERV: HCPCS

## 2019-08-21 NOTE — PROGRESS NOTES
"  Adult Mental Health Outpatient Group Therapy Progress Note       Client Initial Individualized Goals for Treatment: \" Symptom management of depression\".     See Initial Treatment suggestions for the client during the time between Diagnostic Assessment and completion of the Master Individualized Treatment Plan.     Treatment Goals:  1. Client will notify staff when needing assistance to develop or implement a coping plan to manage suicidal or self injurious urges.Client will use coping plan for safety, as needed.  2. When in life skills group Naomi will practice strategies to help improve motivation so as to get done what she needs to have a more satisfying life routine and providing an update of progress weekly.  4. Bernadine will report on symptoms and identify skills to use to manage depression.  5. Naomi will improve wellness related behaviors by getting enough sleep,exercise and balanced nutrition  6. Client will establish an aftercare plan to include medical providers and social supports by discharge.   7. Bernadine will address reduction of alcohol use, addressing barriers         Area of Treatment Focus:  Symptom Management and Personal Safety    Therapeutic Interventions/Treatment Strategies:  Support, Feedback, Safety Assessments and Clarification    Response to Treatment Strategies:  Accepted Feedback, Gave Feedback, Listened, Focused on Goals, Attentive, Accepted Support and Alert    Name of Group:  Group Psychotherapy I  And Group Psychotherapy II Date 8/21/19 Time 9:00 AM - 9:50 AM and 10:00-10:50    Group Participants: 5 for first hour and 6 for second hour    Description and Outcome:  Bernadine shared that she felt cruddy yesterday physically and did not get out of bed.  She stated that she felt bad about cancelling a  appointment, but is also concerned that she does not feel better after these appointments.  She stated that she has low energy, depressed mood, and procrastination behaviors.  Client " denied suicidal ideation, intent and plan.     Group Psychotherapy II:  Client participated in a discussion on identifying core beliefs.  Client chose one negative core belief to challenge by identifying evidence and facts to challenge the negative core belief.  Client shared their core beliefs and evidence against the chosen belief with the group.     Client demonstrated understanding of session content by sharing current symptoms and stressors.    Is this a Weekly Review of the Progress on the Treatment Plan?  No

## 2019-08-23 ENCOUNTER — HOSPITAL ENCOUNTER (OUTPATIENT)
Dept: BEHAVIORAL HEALTH | Facility: CLINIC | Age: 44
End: 2019-08-23
Attending: PSYCHIATRY & NEUROLOGY
Payer: MEDICARE

## 2019-08-23 PROCEDURE — G0177 OPPS/PHP; TRAIN & EDUC SERV: HCPCS

## 2019-08-23 PROCEDURE — 90853 GROUP PSYCHOTHERAPY: CPT

## 2019-08-23 NOTE — PROGRESS NOTES
Adult Mental Health Outpatient Group Therapy Progress Note       Treatment Goals:     1. Client will notify staff when needing assistance to develop or implement a coping plan to manage suicidal or self injurious urges.Client will use coping plan for safety, as needed.  2. When in life skills group Naomi will practice strategies to help improve motivation so as to get done what she needs to have a more satisfying life routine and providing an update of progress weekly.  4. Bernadine will report on symptoms and identify skills to use to manage depression.  5. Naomi will improve wellness related behaviors by getting enough sleep,exercise and balanced nutrition  6. Client will establish an aftercare plan to include medical providers and social supports by discharge.   7. Bernadine will address reduction of alcohol use, addressing barriers       Treatment Goals from Individualized Treatment Plan:   1) Safety: Client will notify staff when needing assistance to develop or implement a coping plan to manage suicidal or self injurious urges.  Client will use coping plan for safety, as needed.  2) Symptom Management: Bernadine will process life stressors and identify ways to work through and problem solve current issues as they come up.  3) Life Skills: n life skills Bernadine will learn, explore, practice and apply 2-3 skills/strategies that promote mindfulness and help her establish routine and life balance specifically in area of finding employment. Report on progress weekly.   4) Wellness/D.c planning: Will improve wellness related behaviors by setting wellness goals weekly. Reporting on progress weekly          Area of Treatment Focus:  Symptom Management, Community Resources/Discharge Planning and Develop Socialization / Interpersonal Relationship Skills    Therapeutic Interventions/Treatment Strategies:  Support, Feedback, Problem Solving, Clarification and Education    Response to Treatment Strategies:  Accepted Feedback, Gave  "Feedback, Listened, Focused on Goals, Attentive, Accepted Support and Alert    Name of Group:  Psychotherapy session time: [id number]  8 attending     Description and Outcome:  Client rates her depression at 3 of 10 today. She reports feeling \"awful\" after attending book club with a friend last night. Client felt that others there had \"normal lives with homes and husbands\". She questions whether she will be invited back and this triggers feelings of \"always being left out and behind\". Group members challenged this thinking. Berndaine does have plans of going to the state fair and the [employer] festival this weekend. No safety concerns.  Client demonstrates an understanding of session content by fully participating, sharing and giving feedback.    Is this a Weekly Review of the Progress on the Treatment Plan?  Yes.      Are Treatment Plan Goals being addressed?  Yes, continue treatment goals      Are Treatment Plan Strategies to Address Goals Effective?  Yes, continue treatment strategies      Are there any current contracts in place?  No  "

## 2019-08-27 ENCOUNTER — HOSPITAL ENCOUNTER (OUTPATIENT)
Dept: BEHAVIORAL HEALTH | Facility: CLINIC | Age: 44
End: 2019-08-27
Attending: PSYCHIATRY & NEUROLOGY
Payer: MEDICARE

## 2019-08-27 PROCEDURE — 90853 GROUP PSYCHOTHERAPY: CPT

## 2019-08-27 PROCEDURE — G0177 OPPS/PHP; TRAIN & EDUC SERV: HCPCS

## 2019-08-27 NOTE — PROGRESS NOTES
"Adult Mental Health Outpatient Group Therapy Progress Note     Client Initial Individualized Goals for Treatment: \" Symptom management of depression\".     See Initial Treatment suggestions for the client during the time between Diagnostic Assessment and completion of the Master Individualized Treatment Plan.     Treatment Goals:  1. Client will notify staff when needing assistance to develop or implement a coping plan to manage suicidal or self injurious urges.Client will use coping plan for safety, as needed.  2. When in life skills group Naomi will practice strategies to help improve motivation so as to get done what she needs to have a more satisfying life routine and providing an update of progress weekly.  4. Bernadine will report on symptoms and identify skills to use to manage depression.  5. Naomi will improve wellness related behaviors by getting enough sleep,exercise and balanced nutrition  6. Client will establish an aftercare plan to include medical providers and social supports by discharge.   7. Bernadine will address reduction of alcohol use, addressing barriers         Area of Treatment Focus:  Symptom Management and Personal Safety    Therapeutic Interventions/Treatment Strategies:  Support, Feedback, Safety Assessments and Clarification    Response to Treatment Strategies:  Accepted Feedback, Gave Feedback, Listened, Focused on Goals, Attentive, Accepted Support and Alert     Name of Group: psychotherapy Date/Time: 8/27 / 19 1000 to 1050    Number of Group Participants: 8     Description and Outcome:  Bernadine reports low mood.  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  She demonstrated minimal insight into concepts of core beliefs and cognitive distortions.       Client demonstrated understanding of session content by asking for " group's feedback .    Client would benefit from additional opportunities to practice and implement content from this session.    Is this a Weekly Review of the Progress on the Treatment Plan?  No

## 2019-08-27 NOTE — PROGRESS NOTES
"Adult Mental Health Outpatient Group Therapy Progress Note     Client Initial Individualized Goals for Treatment: \" Symptom management of depression\".     See Initial Treatment suggestions for the client during the time between Diagnostic Assessment and completion of the Master Individualized Treatment Plan.     Treatment Goals:  1. Client will notify staff when needing assistance to develop or implement a coping plan to manage suicidal or self injurious urges.Client will use coping plan for safety, as needed.  2. When in life skills group Naomi will practice strategies to help improve motivation so as to get done what she needs to have a more satisfying life routine and providing an update of progress weekly.  3. In group Naomi  will learn, practice, generalize 2 sensory modulation or sensorimotor mindfulness based self regulation skills for improved ability to stay in the present moment and distress tolerance.  4. Bernadine will report on symptoms and identify skills to use to manage depression.  5. Naomi will improve wellness related behaviors by getting enough sleep,exercise and balanced nutrition  6. Client will establish an aftercare plan to include medical providers and social supports by discharge.       Area of Treatment Focus:  Symptom Management, Personal Safety and Community Resources/Discharge Planning    Therapeutic Interventions/Treatment Strategies:  Support, Feedback, Safety Assessments, Problem Solving, Clarification, Education and Cognitive Behavioral Therapy    Response to Treatment Strategies:  Accepted Feedback, Listened, Alert     Name of Group: Psychotherapy Group 1 on 07/02/19 at 9:00-9:50 am     Number of Group Participants:  5     Description and Outcome:  Client presented today with a calm and brighter presentation.  Today she seemed to be in a better place and this has been more consistent.  She shared with the group that she was feeling bugged by something and wanted to talk about it. " Patient information on fall and injury prevention Yesterday when she went to therapy her therapist brought up how much she has been drinking.  The client admits that it has increased but is not sure that she wants to stop it.  She would consider using moderation.  She also brought up how she still wants to visit [facility] before she discharges which should be happening soon.  She reports daily suicidal ideation but it is passive. She has stayed safe during her time in the program.     Client demonstrated understanding of session content by participating verbally and then contributing in a appropriate way.     Client would benefit from practicing her new coping skills.     Is this a Weekly Review of the Progress on the Treatment Plan?  No.      Are Treatment Plan Goals being addressed?  Yes, continue treatment goals      Are Treatment Plan Strategies to Address Goals Effective?  Yes, continue treatment strategies      Are there any current contracts in place?  No

## 2019-08-27 NOTE — PROGRESS NOTES
"Adult Mental Health Outpatient Group Therapy Progress Note     Client Initial Individualized Goals for Treatment: \" Symptom management of depression\".     See Initial Treatment suggestions for the client during the time between Diagnostic Assessment and completion of the Master Individualized Treatment Plan.     Treatment Goals:  1. Client will notify staff when needing assistance to develop or implement a coping plan to manage suicidal or self injurious urges.Client will use coping plan for safety, as needed.  2. When in life skills group Naomi will practice strategies to help improve motivation so as to get done what she needs to have a more satisfying life routine and providing an update of progress weekly.  4. Bernadine will report on symptoms and identify skills to use to manage depression.  5. Naomi will improve wellness related behaviors by getting enough sleep,exercise and balanced nutrition  6. Client will establish an aftercare plan to include medical providers and social supports by discharge.   7. Bernadine will address reduction of alcohol use, addressing barriers       Area of Treatment Focus:  Symptom Management    Therapeutic Interventions/Treatment Strategies:  Support, Feedback, Structured Activity, Clarification and Education    Response to Treatment Strategies:  Accepted Feedback, Gave Feedback, Listened, Focused on Goals, Attentive, Accepted Support and Alert     Name of Group: mental health management Date/Time: 8/27 / 19 900 to 950    Number of Group Participants: 7     Description and Outcome:  Patients were provided with background information/discussion on \"bodyfulness\", bringing the body into mindfulness practise.  Patients discussed barriers to mindfulness and bodyfulness.  The group was offered a an experiential activity to support and understand this topic.  Patients were invited to be curious and non judgmental as they explored breath and movement through space.    Patient demonstrated " understanding of session by engaging in experiential and sharing in discussion      Client would benefit from additional opportunities to practice and implement content from this session.    Is this a Weekly Review of the Progress on the Treatment Plan?  No

## 2019-08-28 ENCOUNTER — HOSPITAL ENCOUNTER (OUTPATIENT)
Dept: BEHAVIORAL HEALTH | Facility: CLINIC | Age: 44
End: 2019-08-28
Attending: PSYCHIATRY & NEUROLOGY
Payer: MEDICARE

## 2019-08-28 PROCEDURE — 90853 GROUP PSYCHOTHERAPY: CPT

## 2019-08-28 PROCEDURE — G0177 OPPS/PHP; TRAIN & EDUC SERV: HCPCS

## 2019-08-28 NOTE — PROGRESS NOTES
Adult Mental Health Day Treatment  TRACK: 4B    PATIENT'S NAME: Bernadine Samaniego  MRN:   [id number]  :   1975  ACCT. NUMBER: [id number]  DATE OF SERVICE: 19  START TIME: 1100  END TIME: 1150  NUMBER OF PARTICIPANTS: 8      Summary of Group / Topics Discussed:  Coping Skills: Radical Acceptance: Patients learned radical acceptance as a way to tolerate heightened stress in the moment.  Patients identified situations that necessitate radical acceptance.  They focused on applying acceptance of the moment to tolerate difficult emotions and events. Patients discussed how to distinguish when this can be useful in their lives and when other skills are more relevant or helpful.    Patient Session Goals / Objectives:    Understand that some amount of pain exists for each of us in our lives    Process the difficulty of acceptance in our lives and benefits of radical acceptance to mood and functioning.    Demonstrate understanding of when to use the radical acceptance to tolerate distress by providing examples of situations where this could be applied.    Identify barriers to applying radical acceptance to difficult situations and explore strategies to overcome them.    Patient Participation / Response:  Fully participated with the group by sharing personal reflections / insights and openly received / provided feedback with other participants.    Demonstrated understanding of topics discussed through group discussion and participation    Treatment Plan:  Patient has a current master individualized treatment plan.  See Epic treatment plan for more information.        RUTHANN Connolly

## 2019-08-29 NOTE — PROGRESS NOTES
"Adult Mental Health Outpatient Group Therapy Progress Note     Client Initial Individualized Goals for Treatment: \" Symptom management of depression\".     See Initial Treatment suggestions for the client during the time between Diagnostic Assessment and completion of the Master Individualized Treatment Plan.     Treatment Goals:  1. Client will notify staff when needing assistance to develop or implement a coping plan to manage suicidal or self injurious urges.Client will use coping plan for safety, as needed.  2. When in life skills group Naomi will practice strategies to help improve motivation so as to get done what she needs to have a more satisfying life routine and providing an update of progress weekly.  4. Bernadine will report on symptoms and identify skills to use to manage depression.  5. Naomi will improve wellness related behaviors by getting enough sleep,exercise and balanced nutrition  6. Client will establish an aftercare plan to include medical providers and social supports by discharge.   7. Bernadine will address reduction of alcohol use, addressing barriers       Area of Treatment Focus:  Symptom Management and Personal Safety    Therapeutic Interventions/Treatment Strategies:  Support, Redirection, Safety Assessments and Clarification    Response to Treatment Strategies:  Accepted Feedback, Gave Feedback, Listened, Focused on Goals, Attentive, Accepted Support and Alert     Name of Group Date/Time: psychotherapy / 8/28/19 1000 to  1050    Number of Group Participants: 8     Description and Outcome:  Bernadine shares that she feels she is in a \"bad place.\"  She voices dissatisfaction and disappointment with her progress.  She states she is frustrated with discharging on Friday in this current state, however acknowledges that things will not be different.  She remains hopeless regarding her ability to be \"happy' with her life.  She was given feedback from peers regarding personal responsibility and radical " acceptance.  Bernadine expressed some confusion over these concepts    Client demonstrated understanding of session content by asking for feedback from group and clarification..    Client would benefit from additional opportunities to practice and implement content from this session.    Is this a Weekly Review of the Progress on the Treatment Plan?  No

## 2019-08-30 ENCOUNTER — HOSPITAL ENCOUNTER (OUTPATIENT)
Dept: BEHAVIORAL HEALTH | Facility: CLINIC | Age: 44
End: 2019-08-30
Attending: PSYCHIATRY & NEUROLOGY
Payer: MEDICARE

## 2019-08-30 PROCEDURE — G0177 OPPS/PHP; TRAIN & EDUC SERV: HCPCS

## 2019-08-30 PROCEDURE — 90853 GROUP PSYCHOTHERAPY: CPT

## 2019-08-30 NOTE — PROGRESS NOTES
Adult Mental Health Day Treatment  TRACK: 4B    PATIENT'S NAME: Bernadine Samaniego  MRN:   [id number]  :   1975  ACCT. NUMBER: [id number]  DATE OF SERVICE: 19  START TIME: 1100  END TIME: 1150  NUMBER OF PARTICIPANTS: 7      Summary of Group / Topics Discussed:  Coping Skills: Radical Acceptance: Patients learned radical acceptance as a way to tolerate heightened stress in the moment.  Patients identified situations that necessitate radical acceptance.  They focused on applying acceptance of the moment to tolerate difficult emotions and events. Patients discussed how to distinguish when this can be useful in their lives and when other skills are more relevant or helpful.    Patient Session Goals / Objectives:    Understand that some amount of pain exists for each of us in our lives    Process the difficulty of acceptance in our lives and benefits of radical acceptance to mood and functioning.    Demonstrate understanding of when to use the radical acceptance to tolerate distress by providing examples of situations where this could be applied.    Identify barriers to applying radical acceptance to difficult situations and explore strategies to overcome them.    Patient Participation / Response:  Minimally participated, only when prompted / asked.    Demonstrated understanding of topics discussed through group discussion and participation  This is patient's last day.  She left group abruptly after receiving feedback.    Treatment Plan:  Patient has a current master individualized treatment plan.  See Epic treatment plan for more information.        RUTHANN Connolly

## 2019-09-03 NOTE — PROGRESS NOTES
Adult Mental Health Day Treatment  TRACK: 4B    PATIENT'S NAME: Bernadine Samaniego  MRN:   [id number]  :   1975  ACCT. NUMBER: [id number]  DATE OF SERVICE: 19  START TIME: 0900  END TIME: 0950  NUMBER OF PARTICIPANTS: 7      Summary of Group / Topics Discussed:  Lifestyle Balance and Structure: Occupations: Patients were provided with an overview on the importance of daily occupations in support of mental health management.  Patients were assisted to establish, restore, and/or modify performance skills and patterns for improved engagement and promotion of positive mental health through meaningful occupations.  Patients gained awareness of the connection between who they are (self identity) with what they do.    Patient Session Goals / Objectives:    Increased awareness of how patient s functioning in identified meaningful occupations are impacted by their mental health status     Developed performance skills and performance patterns to enhance occupational engagement    Explored ways to generalize new awareness and skills to their everyday life    Patient Participation / Response:  Fully participated with the group by sharing personal reflections / insights and openly received / provided feedback with other participants.    Patient presentation: constricted affect, good focus on her structured task work, Verbalized understanding of content and Patient would benefit from additional opportunities to practice the content to be able to generalize it to their everyday life with increased intentionality, consistency, and efficacy in support of their psychiatric recovery    Treatment Plan:  Patient has a current master individualized treatment plan.  See Epic treatment plan for more information.          PATRICIA Smart/NAYELY

## 2019-10-26 NOTE — PROGRESS NOTES
Adult Mental Health Day Treatment  TRACK: 4b    PATIENT'S NAME: Bernadine Samaniego  MRN:   [id number]  :   1975  ACCT. NUMBER: [id number]  DATE OF SERVICE: 19  START TIME: 900  END TIME: 950  NUMBER OF PARTICIPANTS: 7      Summary of Group / Topics Discussed:  Cognitive Restructuring: Perfectionism: Patients discussed and reflected on what perfectionism is, how it develops, and how it impacts functioning. Ways to challenge perfectionism were explored and discussed by the group, with the goal of challenging perfectionistic thinking and improving functioning.    Patient Session Goals / Objectives:    Understand the concept of perfectionistic thoughts and how they develop.     Increase recognition of the connection between perfectionistic thinking and symptoms.    Explore and practice new ways to challenge the perfectionistic stance and replace with reasonable expectations of self and others.    Begin to formulate realistic personal expectations and goals.    Patient Participation / Response:  Fully participated with the group by sharing personal reflections / insights and openly received / provided feedback with other participants.    Demonstrated understanding of topics discussed through group discussion and participation, Expressed understanding of the relationship between behaviors, thoughts, and feelings and Identified barriers to changing thought patterns    Treatment Plan:  Patient has a current master individualized treatment plan.  See Epic treatment plan for more information.        DENISSE Garcia        No

## 2020-10-21 NOTE — ADDENDUM NOTE
Encounter addended by: Dorothy Last RN on: 6/19/2019 3:35 PM   Actions taken: Flowsheet accepted (4) walks frequently

## 2021-05-29 NOTE — PROGRESS NOTES
"  Adult Mental Health Outpatient Group Therapy Progress Note     Client Initial Individualized Goals for Treatment:\"Positive structure.  Concerned about backsliding.  I want to maintain where I am now because I am doing much better.  Continue to move forward.  Work towards finding a part time job- figure out what makes sense as first steps and what type of job to pursue first.\".      Treatment Goals from Individualized Treatment Plan:   1) Safety: Client will notify staff when needing assistance to develop or implement a coping plan to manage suicidal or self injurious urges.  Client will use coping plan for safety, as needed.  2) Symptom Management: Bernadine will process life stressors and identify ways to work through and problem solve current issues as they come up.  3) Life Skills: n life skills Bernadine will learn, explore, practice and apply 2-3 skills/strategies that promote mindfulness and help her establish routine and life balance specifically in area of finding employment. Report on progress weekly.   4) Wellness/D.c planning: Will improve wellness related behaviors by setting wellness goals weekly. Reporting on progress weekly     Area of Treatment Focus:  Symptom Management, Develop / Improve Independent Living Skills and Develop Socialization / Interpersonal Relationship Skills    Therapeutic Interventions/Treatment Strategies:  Support, Feedback, Safety Assessments, Structured Activity and Problem Solving    Response to Treatment Strategies:  Accepted Feedback, Gave Feedback, Listened, Focused on Goals, Attentive and Accepted Support    Name of Group:  Wellness     Description and Outcome:  Pt attended and participated in a structured life skills group session where intervention focused on strategies to improve time management, planning, and organization skills to promote function in daily habits, roles, and routines. Client had a constricted affect. She appeared irritable. She reported that time management " is not a concern to her. Later, she alluded to difficulty with procrastination and avoidance. Identify skills to manage this behavior. Client would benefit from additional opportunities to practice and implement content from this session. Client addressed ITP goal number 3 in this session.    Is this a Weekly Review of the Progress on the Treatment Plan?  No           DISPLAY PLAN FREE TEXT

## 2022-08-15 ENCOUNTER — TELEPHONE (OUTPATIENT)
Dept: BEHAVIORAL HEALTH | Facility: CLINIC | Age: 47
End: 2022-08-15

## 2022-08-15 NOTE — TELEPHONE ENCOUNTER
Patient called- will be discharging from [facility] at [facility] tomorrow. Wants to do Day tx with us.    Lamine scheduled with Caitlin Lizarraga at 2 via video. Writer scheduled with Medicare provider as patient has this listed in her chart.  Patient reports collecting social security in the past but reports she doesn't think she has Medicare anymore.    Sravanthi sent  Contact info up to date.

## 2022-08-17 ENCOUNTER — HOSPITAL ENCOUNTER (OUTPATIENT)
Dept: BEHAVIORAL HEALTH | Facility: CLINIC | Age: 47
Discharge: HOME | End: 2022-08-17
Attending: FAMILY MEDICINE | Admitting: FAMILY MEDICINE
Payer: MEDICAID

## 2022-08-17 PROCEDURE — 90791 PSYCH DIAGNOSTIC EVALUATION: CPT | Mod: GT | Performed by: COUNSELOR

## 2022-08-17 RX ORDER — TRANYLCYPROMINE 10 MG/1
TABLET ORAL
COMMUNITY
Start: 2022-07-07

## 2022-08-17 ASSESSMENT — ANXIETY QUESTIONNAIRES
7. FEELING AFRAID AS IF SOMETHING AWFUL MIGHT HAPPEN: NOT AT ALL
4. TROUBLE RELAXING: NOT AT ALL
IF YOU CHECKED OFF ANY PROBLEMS ON THIS QUESTIONNAIRE, HOW DIFFICULT HAVE THESE PROBLEMS MADE IT FOR YOU TO DO YOUR WORK, TAKE CARE OF THINGS AT HOME, OR GET ALONG WITH OTHER PEOPLE: SOMEWHAT DIFFICULT
GAD7 TOTAL SCORE: 3
3. WORRYING TOO MUCH ABOUT DIFFERENT THINGS: SEVERAL DAYS
7. FEELING AFRAID AS IF SOMETHING AWFUL MIGHT HAPPEN: NOT AT ALL
8. IF YOU CHECKED OFF ANY PROBLEMS, HOW DIFFICULT HAVE THESE MADE IT FOR YOU TO DO YOUR WORK, TAKE CARE OF THINGS AT HOME, OR GET ALONG WITH OTHER PEOPLE?: SOMEWHAT DIFFICULT
GAD7 TOTAL SCORE: 3
GAD7 TOTAL SCORE: 3
2. NOT BEING ABLE TO STOP OR CONTROL WORRYING: SEVERAL DAYS
6. BECOMING EASILY ANNOYED OR IRRITABLE: NOT AT ALL
1. FEELING NERVOUS, ANXIOUS, OR ON EDGE: SEVERAL DAYS
5. BEING SO RESTLESS THAT IT IS HARD TO SIT STILL: NOT AT ALL

## 2022-08-17 ASSESSMENT — COLUMBIA-SUICIDE SEVERITY RATING SCALE - C-SSRS
1. IN THE PAST MONTH, HAVE YOU WISHED YOU WERE DEAD OR WISHED YOU COULD GO TO SLEEP AND NOT WAKE UP?: YES
4. HAVE YOU HAD THESE THOUGHTS AND HAD SOME INTENTION OF ACTING ON THEM?: YES
REASONS FOR IDEATION LIFETIME: COMPLETELY TO END OR STOP THE PAIN (YOU COULDN'T GO ON LIVING WITH THE PAIN OR HOW YOU WERE FEELING)
3. HAVE YOU BEEN THINKING ABOUT HOW YOU MIGHT KILL YOURSELF?: YES
2. HAVE YOU ACTUALLY HAD ANY THOUGHTS OF KILLING YOURSELF IN THE PAST MONTH?: YES
1. HAVE YOU WISHED YOU WERE DEAD OR WISHED YOU COULD GO TO SLEEP AND NOT WAKE UP?: YES
2. HAVE YOU ACTUALLY HAD ANY THOUGHTS OF KILLING YOURSELF?: YES
4. HAVE YOU HAD THESE THOUGHTS AND HAD SOME INTENTION OF ACTING ON THEM?: NO
TOTAL  NUMBER OF INTERRUPTED ATTEMPTS LIFETIME: NO
1. IN THE PAST MONTH, HAVE YOU WISHED YOU WERE DEAD OR WISHED YOU COULD GO TO SLEEP AND NOT WAKE UP?: YES
5. HAVE YOU STARTED TO WORK OUT OR WORKED OUT THE DETAILS OF HOW TO KILL YOURSELF? DO YOU INTEND TO CARRY OUT THIS PLAN?: NO
2. HAVE YOU ACTUALLY HAD ANY THOUGHTS OF KILLING YOURSELF?: YES
TOTAL  NUMBER OF ABORTED OR SELF INTERRUPTED ATTEMPTS LIFETIME: NO
ATTEMPT PAST THREE MONTHS: NO
REASONS FOR IDEATION PAST MONTH: MOSTLY TO END OR STOP THE PAIN (YOU COULDN'T GO ON LIVING WITH THE PAIN OR HOW YOU WERE FEELING)
3. HAVE YOU BEEN THINKING ABOUT HOW YOU MIGHT KILL YOURSELF?: YES
6. HAVE YOU EVER DONE ANYTHING, STARTED TO DO ANYTHING, OR PREPARED TO DO ANYTHING TO END YOUR LIFE?: NO
ATTEMPT LIFETIME: YES
5. HAVE YOU STARTED TO WORK OUT OR WORKED OUT THE DETAILS OF HOW TO KILL YOURSELF? DO YOU INTEND TO CARRY OUT THIS PLAN?: YES
5. HAVE YOU STARTED TO WORK OUT OR WORKED OUT THE DETAILS OF HOW TO KILL YOURSELF? DO YOU INTEND TO CARRY OUT THIS PLAN?: NO
4. HAVE YOU HAD THESE THOUGHTS AND HAD SOME INTENTION OF ACTING ON THEM?: NO
6. HAVE YOU EVER DONE ANYTHING, STARTED TO DO ANYTHING, OR PREPARED TO DO ANYTHING TO END YOUR LIFE?: YES
TOTAL  NUMBER OF ACTUAL ATTEMPTS LIFETIME: 1

## 2022-08-17 ASSESSMENT — PATIENT HEALTH QUESTIONNAIRE - PHQ9
10. IF YOU CHECKED OFF ANY PROBLEMS, HOW DIFFICULT HAVE THESE PROBLEMS MADE IT FOR YOU TO DO YOUR WORK, TAKE CARE OF THINGS AT HOME, OR GET ALONG WITH OTHER PEOPLE: SOMEWHAT DIFFICULT
SUM OF ALL RESPONSES TO PHQ QUESTIONS 1-9: 8
SUM OF ALL RESPONSES TO PHQ QUESTIONS 1-9: 8

## 2022-08-17 NOTE — PROGRESS NOTES
"[facility]  Provider Name:  Caitlin Balderas, MSW, [facility], [facility]    PATIENT'S NAME: Bernadine Samaniego  PREFERRED NAME: Bernadine  PRONOUNS:     She/her  MRN: [id number]  : 1975  ADDRESS: [address] [place] [address]  ACCT. NUMBER:  [id number]  DATE OF SERVICE: 22  START TIME: 2:00pm  END TIME: 3:13pm  PREFERRED PHONE: [phone]   [email or web address]  Emergeny Contact: Mother Bushra Samaniego [phone]  May we leave a program related message: Yes  SERVICE MODALITY:  Video Visit:      Provider verified identity through the following two step process.  Patient provided:  Patient  and Patient address    Telemedicine Visit: The patient's condition can be safely assessed and treated via synchronous audio and visual telemedicine encounter.      Reason for Telemedicine Visit: Services only offered telehealth    Originating Site (Patient Location): Patient's home    Distant Site (Provider Location): Provider Remote Setting- Home Office    Consent:  The patient/guardian has verbally consented to: the potential risks and benefits of telemedicine (video visit) versus in person care; bill my insurance or make self-payment for services provided; and responsibility for payment of non-covered services.     Patient would like the video invitation sent by:  My Chart    Mode of Communication:  Video Conference via GAMINSIDE    As the provider I attest to compliance with applicable laws and regulations related to telemedicine.    UNIVERSAL ADULT Mental Health DIAGNOSTIC ASSESSMENT    Identifying Information:  Patient is a 46 year old individual. Patient was referred for an assessment by \"hospital.\"  Patient attended the session alone.    Chief Complaint:   The reason for seeking services at this time is: \"Depression\".  The problem(s) began \"2021.\" She stated she has had severe episodes of depression for a long time. She was extremely suicidal and wasn't " "functioning. She wasn't showering or getting up. Then she was voluntarily hospitalized at [facility] on 07/05 for about a week. She had to wait for their [facility], and started it on 07/27/2022. She was in [facility] until yesterday. They referred her to [facility] for aftercare. She found [facility] to be really helpful. She is still not employed and doesn't do well without structure. She knows she needs more continued help and wants IOP. Patient knew that [facility] only had video programming. She wants an in-person group. She was able to get scheduled for an [facility] intake tomorrow.     Social/Family History:  Patient reported they grew up in \"[place]. They were raised by biological mother; stepfather. Parents one or both remarried\" when patient was aged 9. She has a younger half-brother. Patient reported that their childhood was: \"traumatic\" and \"unhappy\" starting at 3.5 years old because of her mother and stepfather. She denied physical and sexual abuse. She didn't have a good relationship with her stepfather as a child. Patient described their current relationships with family of origin as: She now has a decent relationship with her stepfather. It's taken them a long time to accept that she has depression, which needs to be managed, but can't be cured. Her mother and stepfather have been very supportive, and they are paying for her rent. It's not easy for them to pay and it is a sacrifice for them.     The patient describes their cultural background as \".\"  Cultural influences and impact on patient's life structure, values, norms, and healthcare: \"No.\"  Contextual influences on patient's health include: none.    These factors will be addressed in the Preliminary Treatment plan. Patient identified their preferred language to be English. Patient reported they does not need the assistance of an  or other support involved in therapy.     Patient reported no significant delays in developmental " "tasks. Patient's highest education level was \"graduate school.\"  Patient identified the following learning problems: none reported.  Modifications will not be used to assist communication in therapy.  Patient reports they ar  able to understand written materials.    Patient reported the following relationship history: never .  Patient's current relationship status is \"single\".   Patient identified their sexual orientation as \"heterosexual.\"  Patient reported having no children. Patient identified \"parents; therapist\" as part of their support system.  Patient identified the quality of these relationships as \"good.\"  She feels she lost her friend group and she hasn't reconnected socially.     Patient's current living/housing situation involves staying in own apartment.  The immediate members of family and household include \"Bernadine Samaniego, 46,Self\" and they report that housing is stable.    Patient is currently \"unemployed.\" She lost her job in 11/2021. She was on social security disability for a while, but she got a job. She retained some of Medicare benefits, but got private insurance until she lost her job. Now, she is unsure what her insurance is. She is going to contact Social Security office this week. Patient reports their finances are obtained through \"parents; [facility] assistance.\" Patient does identify finances as a current stressor.      Patient reported that they have not been involved with the legal system. Patient does not report being under probation/ parole/ jurisdiction.     Patient's Strengths and Limitations:  Patient identified the following strengths or resources that will help them succeed in treatment: commitment to health and well being, family support, insight, intelligence, motivation, strong social skills and work ethic. Things that may interfere with the patient's success in treatment include: financial hardship, lack of social support and physical health concerns.     Assessments:  The " following assessments were completed by patient for this visit:  PHQ9:   PHQ-9 SCORE 2/25/2016 8/7/2017 4/9/2019 8/17/2022   PHQ-9 Total Score MyChart - - - 8 (Mild depression)   PHQ-9 Total Score 20 9 15 8     GAD7:   ALMA-7 SCORE 2/25/2016 8/7/2017 4/9/2019 8/17/2022   Total Score - - - 3 (minimal anxiety)   Total Score 18 5 14 3     PROMIS 10-Global Health (only subscores and total score):   PROMIS-10 Scores Only 8/17/2022   Global Mental Health Score 6   Global Physical Health Score 13   PROMIS TOTAL - SUBSCORES 19     [place] Suicide Severity Rating Scale (Short Version)  [place] Suicide Severity Rating (Short Version) 8/17/2022   Q1 Wished to be Dead (Past Month) yes   Q2 Suicidal Thoughts (Past Month) yes   Q3 Suicidal Thought Method yes   Q4 Suicidal Intent without Specific Plan no   Q5 Suicide Intent with Specific Plan no   Q6 Suicide Behavior (Lifetime) yes   Within the Past 3 Months? no   Level of Risk per Screen moderate risk       Personal and Family Medical History:  Patient does not report a family history of mental health concerns.  Patient reports family history includes Cancer in her father..     Patient reported the following previous diagnoses which include(s): Severe recurring major depressive disorder.  Patient reported symptoms began in childhood.  Patient has not received mental health services in the past: She was in therapy as a teenager. She did Day Treatment at [facility] in 2016.  Psychiatric Hospitalizations: She had a severe mental episode in 2016, suicide attempt, and was hospitalized twice at [facility]. Recently 07/05/2022, she was hospitalized recently at [facility] for a week. Patient denies a history of civil commitment.  Currently, patient is receiving other mental health services - her therapist is Rosanne Barrera in [place]. Patient has been working with Rosanne off and on since 2016. She restarted with Rosanne again in July 2022.  Her psychiatrist is Dr. Mayen at [facility]. She has been working with him  for several years or since 2017. She hasn't seen her psychiatrist recently because she couldn't see him while in [facility]. Her next appointment is in September.        Patient has had a physical exam to rule out medical causes for current symptoms.  Date of last physical exam was within the past year. The patient has a doctor Dr. Darrell [facility] at [facility]. Patient reports the following current medical concerns: fatigue. She hasn't developed a sleep routine.    Patient denies any issues with pain.  There are not significant appetite / nutritional concerns / weight changes - She gains weight when depressed, but she isn't compulsively eating. Patient does report a history of head injury / trauma - She has had concussions as a child.    Current Outpatient Medications   Medication     amphetamine-dextroamphetamine (ADDERALL) 20 MG tablet     tranylcypromine (PARNATE) 10 MG tablet     levothyroxine (SYNTHROID/LEVOTHROID) 50 MCG tablet     Medication Adherence:  Patient reports taking medications as prescribed. She has had fatigue issues for years and Adderall was prescribed for energy. She is prescribed Adderall 20mg IR. Even the short-acting makes it difficult to sleep at night, so she tries not to take it. She wasn't taking it before the hospital because she was not working and it was not needed. In [facility], she took Adderall half of the days. In the hospital, she started on an MAOI Parnate 10mg BID and 20mg at night. She has trouble sleeping and they upped Parnate 20mg at night (40mg total). They tried this one because she hasn't responded to medications. She stated she needs to monitor her reactions to the medication.      Patient Allergies:    Allergies   Allergen Reactions     Suprax [Cefixime]        Medical History:    Past Medical History:   Diagnosis Date     Depression     In counseling; has Psychiatrist     Depressive disorder        Current Mental Status Exam:   Appearance:  Appropriate     Eye Contact:  Fair - computer monitor and camera were not in same location  Psychomotor:  Restless       Gait / station:  no problem  Attitude / Demeanor: Cooperative  Pleasant  Speech      Rate / Production: Normal/ Responsive Pressured  Talkative      Volume:  Normal  volume      Language:  intact  Mood:   Anxious  Dysphoric  Affect:   Appropriate  Tearful   Thought Content: Clear   Thought Process: Coherent Circumstantial      Associations: No loosening of associations  Insight:   Good   Judgment:  Intact   Orientation:  All  Attention/concentration: Fair and Easily Distracted      Substance Use:  Patient did not report a family history of substance use concerns. Patient has received chemical dependency treatment in 2016 at [facility]. The therapist told her that she was abusing alcohol as a symptom of depression. Patient stated she can relate to people who have mental health, but can't relate to people in chemical dependency groups.   Patient has not been to detox. Patient is not currently receiving any chemical dependency treatment.       Substance History of use Age of first use Date of last use     Pattern and duration of use (include amounts and frequency)   Alcohol used in the past   19 7/4/2022 In 2014, her drinking increased, but wasn't always daily. She likes drinking and was drinking a lot in 2016 when she was having mental health issues then. Her longest sobriety was a few months in 2016 or 2017.     She lost her job in 11/2021 and started drinking a lot. For months, she was drinking a lot of alcohol daily, consuming mixed drinks, wine, beer.     When not drinking daily, she stated her drinking is social with friends.      Cannabis   never used     She tried pot in college.    Amphetamines   never used     Adderall is prescribed for fatigue. She denied taking more than prescribed. She denied other amphetamine use.    Cocaine/crack    never used          Hallucinogens never used           "  Inhalants never used            Heroin never used            Other Opiates never used        Benzodiazepine   never used        Barbiturates never used        Over the counter meds never used        Caffeine currently use teens 8/17/22 She stopped energy drinks. She drinks coffee in morning.    Nicotine  currently use 19 8/17/22 She smokes 1/2 pack per day.    Other substances not listed above: never used          Patient reported the following problems as a result of their substance use: \"I was abusing alcohol prior to hospitalization and I m sure it increased my depressive symptoms.\" Now she is staying away from alcohol because of the new medication. She also financially cannot afford alcohol. She stated she has wanted to drink, but she can't go out and buy it.      Substance Use: daily use, social problems related to substance use and cravings/urges to use  She denied typical alcohol withdrawal symptoms.     CAGE-AID Total Score 8/17/2022   Total Score 3   Total Score MyChart 3 (A total score of 2 or greater is considered clinically significant)     Based on the positive CAGE score and clinical interview there are indications of drug or alcohol abuse.    Significant Losses / Trauma / Abuse / Neglect Issues:   Patient did not serve in the .  There are indications or report of significant loss, trauma, abuse or neglect issues related to: Patient reported that their childhood was: \"traumatic\" and \"unhappy\" starting at 3.5 years old because of her mother and stepfather.  Concerns for possible neglect are not present.     Safety Assessment: Patient denied suicidal ideation, plan, and intent since leaving the hospital. She had been depressed for months and had been planning how to suicide via cutting her neck artery, but she went to the hospital instead. In 2016, she attempted suicide by overdosing and she went to the hospital via ambulance. She stated she can't overdose this time because she is on different " "medications, and there is nothing obvious in the house to use to hurt herself.   Patient denies current homicidal ideation and behaviors.  Patient denies current self-injurious ideation and behaviors.    Patient denied risk behaviors associated with substance use.  Patient denies any high risk behaviors associated with mental health symptoms.  Patient reports the following current concerns for their personal safety: None.  Patient reports there are not firearms in the house.        History of Safety Concerns:  Patient denied a history of homicidal ideation.     Patient denied a history of personal safety concerns.    Patient denied a history of assaultive behaviors.    Patient denied a history of sexual assault behaviors.     Patient denied a history of risk behaviors associated with substance use.  Patient reported a history of impulsive decision making associated with mental health symptoms.  Patient reports the following protective factors: \"forward or future oriented thinking; safe and stable environment; help seeking behaviors when distressed; abstinence from substances; adherence with prescribed medication; agreement to use safety plan; structured day; effective problem solving skills; commitment to well being; access to a variety of clinical interventions and pets\"    Risk Plan:  See Recommendations for Safety and Risk Management Plan    Review of Symptoms per patient report:  Depression: Change in sleep, Lack of interest, Excessive or inappropriate guilt, Change in energy level, Difficulties concentrating, Feelings of helplessness, Low self-worth, Feeling sad, down, or depressed, Withdrawn and Poor hygeine -  The fatigue issues are the most pressing. She can't tell if it's depression or fatigue. She is trying to get a sleep routine. She is still avoiding some tasks.   Rosina:  No Symptoms  Psychosis: No Symptoms  Anxiety: Nervousness - She is anxious about things she should be anxious about. She needs to find " some sort of employment.   Panic:  No symptoms  Post Traumatic Stress Disorder:  No Symptoms   Eating Disorder: No Symptoms  ADD / ADHD:  No symptoms - She denied ADHD symptoms and Adderall is prescribed for fatigue.   Conduct Disorder: No symptoms  Autism Spectrum Disorder: No symptoms  Obsessive Compulsive Disorder: No Symptoms    Patient reports the following compulsive behaviors and treatment history: None.      Diagnostic Criteria:   Persistent Depressive Disorder  A. Depressed mood for most of the day, for more days than not, as indicated either by subjective account or observation by others, for at least 2 years. Note: In children and adolescents, mood can be irritable and duration must be at least 1 year.   B. Presence, while depressed, of two (or more) of the following:        - poor appetite or overeating        - insomnia or hypersomnia       - low energy or fatigue        - low self-esteem        - poor concentration or difficulty making decisions        - feelings of hopelessness   C. During the 2-year period (1 year for children or adolescents) of the disturbance, the person has never been without the symptoms in Criteria A and B for more than 2 months at a time.  D. Criteria for a major depressive disorder may be continously present for 2 years  E. There has never been a Manic Episode, a Mixed Episode, or a Hypomanic Episode, and criteria have never been met for Cyclothymic Disorder.   F. The disturbance is not better explained by a persistent schizoaffective disorder, schizophrenia, delusional disorder, or other specified or unspecified schizophrenia spectrum and other psychotic disorder  G. The symptoms are not attributable to the physiological effects of a substance (e.g., a drug of abuse, a medication) or another medical condition (e.g., hypothyroidism).   H. The symptoms cause clinically significant distress or impairment in social, occupational, or other important areas of functioning.   "  Alcohol Use Disorder  1.) Alcohol/drug is often taken in larger amounts or over a longer period than was intended.  Met for Alcohol.  2.) There is a persistent desire or unsuccessful efforts to cut down or control alcohol/drug use.  Met for Alcohol.  3.) A great deal of time is spent in activities necessary to obtain alcohol, use alcohol, or recover from its effects.  Met for Alcohol.  4.) Craving, or a strong desire or urge to use alcohol/drug.  Met for Alcohol.  6.) Continued alcohol use despite having persistent or recurrent social or interpersonal problems caused or exacerbated by the effects of alcohol/drug.  Met for Alcohol.  9.) Alcohol/drug use is continued despite knowledge of having a persistent or recurrent physical or psychological problem that is likely to have been caused or exacerbated by the alcohol/drug.  Met for Alcohol  10.) Tolerance, as defined by either of the following: A markedly diminished effect with continued use of the same amount of alcohol/drug..  Met for Alcohol.    Functional Status:  Patient reports the following functional impairments:  \"home life; management of the household and or completion of tasks;money management; relationship(s); self care; social interactions; work or vocational responsibilities.\"       Programmatic care:  Current WHODAS was assigned and patient needs the following level of care based on score 32.    Clinical Summary:  1. Reason for assessment: Patient was referred to [facility] for [facility]care from [facility] to address ongoing depression.  explained ADT has a possible waitlist, but Dual Diagnosis Program doesn't. Patient is unsure if she wants substance use programming, as depression drives her alcohol use. Patient prefers in-person programming and has an intake with [facility]. She will determine if she will go with [facility] or [facility].   2. Psychosocial, Cultural and Contextual Factors: living alone, not working, extreme " fatigue  3. Principal DSM5 Diagnoses  (Sustained by DSM5 Criteria Listed Above):   300.4 (F34.1) Persistent Depressive Disorder, With major depressive episodes, with current episodes    4. Other Diagnoses that is relevant to services:   Alcohol Use Disorder   303.90 (F10.20) Severe    5. Provisional Diagnosis:     6. Prognosis: Expect Improvement and Relieve Acute Symptoms  7. Likely consequences of symptoms if not treated: Patient may need higher level of care  8. Client strengths include:  creative, educated, has a previous history of therapy, insightful, intelligent, motivated, support of family, friends and providers, willing to relate to others and work history      Recommendations:     1. Plan for Safety and Risk Management: A safety and risk management plan has been developed including: Patient consented to co-developed safety plan.  Safety and risk management plan was completed.  Patient agreed to use safety plan should any safety concerns arise.  Report to child / adult protection services was NA.      2. Patient did not identify concerns with a cultural influence.     3. Initial Treatment will focus on: Depressed Mood.     4. Resources/Service Plan:    services are not indicated.   Modifications to assist communication are not indicated.   Additional disability accommodations are not indicated.      5. Collaboration:  Collaboration / coordination of treatment may be initiated with the following support professionals: Possibly with Dr. Mayen at [facility] or therapist Rosanne Barrera in [place].     6.  Referrals:   The following referral(s) will be initiated: Adult Day Treatment. Next Scheduled Appointment: TBD. Patient's insurance is currently Medicaid and Medicare Part A only. If patient's Medicare B or [facility] benefits restart, then she will be transferred to [facility] programming.  Patient placed on waitlist for ADT. Patient may also choose DDP for sooner start date. Patient has an intake the [facility] tomorrow  "and prefers their program if she can start quickly because it is in-person.      Emergency Contact: Mother Bushra Samaniego [phone].     7. SIMI:    SIMI:  Discussed the general effects of drugs and alcohol on health and well-being. Recommendations: Continue to abstain from alcohol. Discuss use of Naltrexone with psychiatrist to decrease cravings for alcohol.     8. Records were reviewed at time of assessment. Information in this assessment was obtained from the medical record and provided by patient who is a fair historian. Patient will have open access to their mental health medical record.          Provider Name/ Credentials:  Caitlin Balderas, SAMMY, LICKATERINA, LADC  August 17, 2022              Outpatient Mental Health Services - Adult    MY COPING PLAN FOR SAFETY    Name: Bernadine Samaniego  YOB: 1975  Date: 8/17/22  My primary care provider: Dr. Darrell Kong at [facility]  My prescriber: Dr. Mayen at [facility]  Other care team support:  therapist Rosanne Barrera in [place]. My Triggers:  Not working, feeling isolated, depression     Additional People, Places, and Things that I can access for support: her parents         What is important to me and makes life worth living: \"My family.\" Her brother has 3 kids. She stated she also has hope for her future and to make her future better.        GREEN    Good Control  1. I feel good  2. No suicidal thoughts   3. Can work, sleep and play      Action Steps  1. Self-care: balanced meals, exercising, sleep practices, etc.  2. Take your medications as prescribed.  3. Continue meetings with therapist and prescriber.  4.  Do the healthy things that I enjoy.           YELLOW  Getting Worse  I have ANY of these:  1. I do not feel good  2. Difficulty Concentrating  3. Sleep is changing  4. Increase/Change in my thoughts to hurt self and/or others, but I can still manage and not act on it.   5. Not taking care of self.             Action Steps (in addition to the above):  1. " Inform your therapist and psychiatric prescriber/PCP.  2. Keep taking your medications as prescribed.    3. Turn to people you can ask for help.  4. Use internal coping strategies -see below.  5. Create safe environment: lock and limit medications, notify friends/family of increase in symptoms and reduce means to other identified method           RED  Get Help  If I have ANY of these:  1. Current and uncontrollable thoughts and/or behaviors to hurt self and/or others.  Actions to manage my safety  1. Contact your emergency person: Mother Bushra Samaniego [phone].  2. Call my crisis team- [facility] [phone] [facility]  3. Or Call 911 or go to the emergency room right away        My Internal Coping Strategies include the following:  . She primarily uses mindfulness and grounding activities. She can talk to parents. She connected with people in [employer] and can reach out to them.    Safety Concerns  How To Identify Situations That Make Your Mental Health Worse:  Triggers are things that make your mental health worse.  Look at the list below to help you find your triggers and what you can do about them.     1. Identify Early Warning Signs:    Sometimes symptoms return, even when people do their best to stay well. Symptoms can develop over a short period of time with little or no warning, but most of the time they emerge gradually over several weeks.  Early warning signs are changes that people experience when a relapse is starting. Some early warning signs are common and others are not as common.   Common Early Warning Signs:    Feeling tense or nervous, Eating less or eating more, Trouble sleeping -either too much or too little sleep, Feeling depressed or low, Feeling irritable, Feeling like not being around other people, Trouble concentrating, Urges to harm self and Urges to use drugs or alcohol     2. Identify action steps to take when warning signs are noticed:    Taking Action- It  is important to take action if you are experiencing early warning signs of a relapse.  The faster you act, the more likely it is that you can avoid a full relapse.  It is helpful to identify several specific ways to cope with symptoms.      The following is my list of symptoms and coping strategies that I can use when they are present:    Symptom Coping Strategies   Anxiety -Talk with someone in your support system and let him or her know how you are feeling.  -Use relaxation techniques such as deep breathing or imagery.  -Use positive affirmations to counteract negative self-talk such as  I am learning to let go of worry.    Depression - Schedule your day; include activities you have to do and activities you enjoy doing.  - Get some exercise - walk, run, bike, or swim.  - Give yourself credit for even the smallest things you get done.   Sleep Difficulties   - Go to sleep at the same time every day.  - Do something relaxing before bed, such as drinking herbal tea or listening to music.  - Avoid having discussions about upsetting topics before going to bed.   Delusions   - Distract yourself from the disturbing thought by doing something that requires your attention such as a puzzle.  - Check out your beliefs by talking to someone you trust.    Hallucinations   - Use headphones to listen to music.  - Tell voices to  stop  or say to yourself,  I am safe.   - Ignore the hallucinations as much as possible; focus on other things.   Concentration Difficulties - Minimize distractions so there is only one thing for you to focus on at a time.    - Ask the person you are having a conversation with to slow down or repeat things you are unsure of.

## 2022-08-17 NOTE — PATIENT INSTRUCTIONS
Welcome to [facility] Adult [facility]patient Programs    Thank you for choosing [facility]!    Congratulations! You have completed the first step in your recovery by participating in a Diagnostic Assessment. With your input, RUTHANN Abebe, ROMAN, is recommending the following level of care and services to best meet your needs.    Managing mental health symptoms while balancing life stressors can be difficult. Our mental health programming will provide the group therapy, education, skills, and support needed to improve your well-being while living a healthy and manageable lifestyle.    All our Adult Mental Health Outpatient Programs are group-based and allow you to meet with peers who are trying to manage similar symptoms and/or life circumstances in a safe and therapeutic setting.    Recommendations and Plan:    Level of Care:  Adult Day Treatment Program (ADT)    Start Date:  To be determined. Program staff, Nakita Nuñez, will be calling with start date. You may also call her at [phone] or call the program at [phone].     If you were placed on a Wait List following your Diagnostic Assessment, please expect the following:  You will receive a phone call from the program within a few days to discuss a start date and plan.    Please contact the program at the number listed above if you are choosing to be removed from the Wait List, need to reschedule your start or if you have any additional questions.    Type of Participation:  Virtual     We are currently providing 100% online group-based programming. It is a requirement that you be physically in the State of [place] when accessing services. Our providers must be licensed in the state you are located in.      To provide the best group experience for everyone, we expect confidentiality, regular attendance, and respectful participation by all.      Cameras need to be on during groups. We want to see you!   Be sure to be in a private place  where others will not overhear or walk in. Using headphones and making sure your screen is not visible to others are steps you can take to ensure confidentiality.   What is said in group, stays in group. (All personal or identifying information shared in group should be kept confidential and not shared with anyone).  NOTE: Audio or Visual recordings are not allowed and may result in immediate discharge from the program.  Zoom may automatically show your first and last name unless you change it when logging into group. We encourage you to change your name to your first or preferred name. You may also include preferred pronouns.   Please be sitting upright in a comfortable position. This will maximize engagement and participation for everyone.   Please refrain from smoking, eating, driving, or engaging in other distracting activities during groups.  Facilitators may provide reminders of the above expectations during group as needed.    If your camera is off and you are not responding to prompts, facilitators will assume you have left and place you in the waiting room. You will need to request to return to group.    Please do NOT cancel your appointments through [employer].  If you are going to be absent, please contact the program number and leave a message so staff will not expect you.   You will NOT be billed for any sessions you do not attend.    See additional attendance and program participation information below.     Accessing Virtual Groups:    The best way to attend groups is through your TabbedOut account. Please ask staff if you need assistance setting up an account. TabbedOut HELPLINE: [phone] or TabbedOut - Login Page [email or web address]).  You will also need to download Zoom Download for Windows - Zoom to your computer (preferred), phone or iPad/Tablet devise. It is NOT necessary to log into or set up a Zoom account.  Log into My Chart each day before group.   Go to the  Visits  tab and select the current date.     You will be asked to verify personal information on the first day or for longer programs, every 30 days. Please allow extra time on your first day to complete this. You will also be asked to complete assessments regularly so we can monitor your progress and address concerns.    The daily invite through [employer] expires 15 minutes after group starts.   You will need to call the program number to request a link to the group if you:   log out of group once it begins   are late to group   get disconnected   are unable to access group for any reason    There is a new link created every day.    Breaks are provided between groups (10 minutes)   Do not log out.  You may mute or pause your video.   The group facilitator may put you in virtual waiting room until the next group starts.        Adult Day Treatment (ADT) Program Overview [phone]    Adult Day Treatment Program Overview:   This level of care is less intensive than an inpatient or partial hospitalization program and provides more support than traditional individual psychotherapy.     Length of Stay Typically, patients attend up to 12 weeks of programming, although this can vary depending on specific patient needs.   Treatment team Multi-disciplinary team includes a licensed psychotherapist, registered nurse, and occupational therapist.     Group-based programming 3 groups per day; 3 days per week  50 minutes per group  10-minute break between each group  Facilitated by a member of the treatment team    Group Psychotherapy is provided daily, allowing time to check-in, process concerns and share feedback/support. All other groups include a topic and provide opportunities for education, skill building, discussion, and support.      Example Group Topics Admission IOP topics are listed above and will align with additional group topics covered throughout the 12-week combined programming.     Topics may include:    Behavior Activation, Cognitive Restructuring:  Cognitive Distortions, Communication Skills/ Areas for Self-Improvement. Coping Skills, Discharge Planning, Dimensions of Wellness, Emotions, Forgiveness, Functional Nutrition, Grief, Life Transitions, Leisure Exploration, Life Skills, Medication Assessment, Mental Health Social Support, Mindfulness, Mood Tracking/Triggers, Motivation and Procrastination, Relationship, Relaxation Techniques, Self-Awareness, Self-Confidence, Self-Care, Self-Compassion, Shame and Guilt, Sleep hygiene, SMART Goals, Stages to [place] with Stress, Stigma, Strategies to Improve Motivation, Symptom Awareness, Time Management, Trauma Triggers, Values, Wellness     Psychiatrist Available for Treatment Team consultation. Patients are encouraged to continue with their outpatient/community providers.   Our Nursing team will partner with you to triage medication questions/concerns.  Patients who need bridging services between providers may request a visit, and we will coordinate when available.   The psychiatrist will partner with you and your other providers for medication management, as needed.   When available, Psychiatry services are billed separately.   Individual Therapy Not provided in this program.   Physical Health Screen Patients meet with a program nurse within the first week to complete a brief physical health screen. This is a program requirement.     FMLA or Short-term Disability We encourage you to request completion of paperwork from your long-term providers.   If this is not an option, please notify the program nurse as soon as possible.  We will do our best to help you coordinate completion of paperwork.   Medical Record requests: please contact Release of Information  at [phone] and allow up to 14 days for records once the authorization for release is received.    Treatment and Discharge Planning Patients meet individually with team members for treatment planning.   We will help you develop goals and identify strengths  and/or barriers.   We will discuss your program participation, progress, and discharge planning.   We are available to assist with referrals and service coordination; please let us know how best we can support your specific needs.   You will receive copies of your treatment plan and discharge summary via [employer].            An Important Note from your Program Treatment Team...  Welcome! We are happy to be partnering with you on your recovery journey. Our experienced clinicians have developed programming based on current research and evidence-based practice to provide you with high quality mental health treatment.     Attendance and Program Participation:  You will participate in a variety of groups each day. Our goal is to provide you with a rich and varied therapeutic healing environment knowing patients have unique experiences and preferred ways of sharing, learning, processing, and engaging in the recovery process.  You are expected to attend all groups on time.  If you are going to be late or absent, please let a team member know the reason. You can also leave that same information at the number listed above.  In the event of an unreported absence, we will reach out to you. If we are unable to reach you, know that we may call your emergency contact.  We always attempt to establish contact with your emergency contact prior to initiating a wellness check.  While it is important that you continue to attend appointments with your individual therapist, psychiatrist, and other medical providers, you are encouraged to schedule these appointments outside of group hours whenever possible.  If your attendance becomes a concern, your treatment team will have a discussion with you and may start an Attendance Agreement. Following your Attendance Agreement is required to prevent early discharge from the program.  To get the most out of the program and to support your wellbeing we require that you do not use any chemicals,  tobacco or vape products on screen or during program hours. The team is available to assist you if this is something you struggle with.  Please be mindful that you are part of a group; therefore, we ask that you be respectful of other group members' needs and do not use derogatory, offensive, or discriminatory language.  You will be removed from group if suspected of being under the influence of substances or if using language that negatively impacts the group.  Your treatment team will address any concerns with you and offer recommendations. A Behavior Agreement may be started. Following your Behavior Agreement is required to prevent early discharge from the program.  Communication with other group members outside of group is discouraged. This can affect your treatment and the way the group functions.  If you choose to share contact information with group peers AFTER you are discharged from the program, this decision is completely independent of any program coordination or support.  While in the program, participants may not engage in any sexual or intimate relationships with each other outside of group. This may result in immediate discharge of both participants from the program.   Trauma:  Many of our patients have experienced trauma. You are not alone. This can be challenging for patients to manage. All our team members have been trained in Trauma Informed Care and will provide you with the education, skills training, and support that you need to stabilize your symptoms.  Specific details and descriptions of abuse, assault, violence, neglect, etc., are best processed in individual therapy as to avoid triggering other group members.  Discussing how traumatic experiences have impacted beliefs about self/others/world and practicing skills to cope with symptoms is very appropriate for group therapy.     We look forward to working together to support your mental health.     We love feedback from our patients about  our program!  Please take a few moments to respond to surveys sent out by [facility].  This will help us continue to make improvements and to keep the things that are   important to you!

## 2022-08-18 RX ORDER — DEXTROAMPHETAMINE SACCHARATE, AMPHETAMINE ASPARTATE, DEXTROAMPHETAMINE SULFATE AND AMPHETAMINE SULFATE 5; 5; 5; 5 MG/1; MG/1; MG/1; MG/1
20 TABLET ORAL 2 TIMES DAILY
COMMUNITY

## 2022-08-21 ENCOUNTER — HEALTH MAINTENANCE LETTER (OUTPATIENT)
Age: 47
End: 2022-08-21

## 2022-08-22 ENCOUNTER — TELEPHONE (OUTPATIENT)
Dept: BEHAVIORAL HEALTH | Facility: CLINIC | Age: 47
End: 2022-08-22

## 2022-08-22 NOTE — TELEPHONE ENCOUNTER
Writer contacted patient to discuss programming and current waitlist status; also wanted to provide patient an opportunity to ask questions regarding the program. Writer requested a call back to [phone].      OH Rain on 8/22/2022 at 11:27 AM

## 2022-08-24 ENCOUNTER — TELEPHONE (OUTPATIENT)
Dept: BEHAVIORAL HEALTH | Facility: CLINIC | Age: 47
End: 2022-08-24

## 2022-08-24 NOTE — TELEPHONE ENCOUNTER
Patient contacted writer to inform her she would be starting in-person services at [facility] starting at the beginning of September.       Nakita Nuñez [facility] on 8/24/2022 at 2:29 PM

## 2022-11-21 ENCOUNTER — HEALTH MAINTENANCE LETTER (OUTPATIENT)
Age: 47
End: 2022-11-21

## 2023-09-16 ENCOUNTER — HEALTH MAINTENANCE LETTER (OUTPATIENT)
Age: 48
End: 2023-09-16

## 2024-11-09 ENCOUNTER — HEALTH MAINTENANCE LETTER (OUTPATIENT)
Age: 49
End: 2024-11-09

## 2024-12-12 NOTE — ADDENDUM NOTE
Encounter addended by: Dorothy Last RN on: 1/19/2018  2:38 PM<BR>     Actions taken: Flowsheet accepted 56-year-old female no past medical history presenting to the emergency department with concern for left ankle pain status post twisting injury.  Patient reports she was getting out of a vehicle for work, she twisted her ankle, she did not fall to the ground, noticed pain right away.  Was able to ambulate on it afterward but pain continued.  She did take pain medicine shortly after the event this morning.  Does not have any foot or toe pain, no tibial or fibular pain.  No other regions of injury or pain.  Reports the pain is mild at this time and controlled by the medications declines any further pain meds right now.